# Patient Record
Sex: FEMALE | Race: WHITE | Employment: OTHER | URBAN - NONMETROPOLITAN AREA
[De-identification: names, ages, dates, MRNs, and addresses within clinical notes are randomized per-mention and may not be internally consistent; named-entity substitution may affect disease eponyms.]

---

## 2017-07-03 ENCOUNTER — OFFICE VISIT (OUTPATIENT)
Dept: OBGYN | Age: 67
End: 2017-07-03
Payer: MEDICARE

## 2017-07-03 VITALS
TEMPERATURE: 98.7 F | DIASTOLIC BLOOD PRESSURE: 66 MMHG | SYSTOLIC BLOOD PRESSURE: 130 MMHG | BODY MASS INDEX: 45.18 KG/M2 | WEIGHT: 255 LBS | HEIGHT: 63 IN | HEART RATE: 88 BPM

## 2017-07-03 DIAGNOSIS — Z01.419 WOMEN'S ANNUAL ROUTINE GYNECOLOGICAL EXAMINATION: Primary | ICD-10-CM

## 2017-07-03 DIAGNOSIS — Z12.4 SCREENING FOR CERVICAL CANCER: ICD-10-CM

## 2017-07-03 PROCEDURE — G8598 ASA/ANTIPLAT THER USED: HCPCS | Performed by: OBSTETRICS & GYNECOLOGY

## 2017-07-03 PROCEDURE — G0101 CA SCREEN;PELVIC/BREAST EXAM: HCPCS | Performed by: OBSTETRICS & GYNECOLOGY

## 2017-07-03 RX ORDER — INSULIN LISPRO 100 [IU]/ML
INJECTION, SOLUTION INTRAVENOUS; SUBCUTANEOUS
COMMUNITY
Start: 2017-06-27 | End: 2020-12-03 | Stop reason: ALTCHOICE

## 2017-07-03 RX ORDER — PRAVASTATIN SODIUM 80 MG/1
TABLET ORAL
COMMUNITY
Start: 2017-06-27 | End: 2021-05-11

## 2017-07-03 RX ORDER — TELMISARTAN 40 MG/1
40 TABLET ORAL DAILY
COMMUNITY
End: 2020-12-03

## 2017-07-03 RX ORDER — GABAPENTIN 300 MG/1
CAPSULE ORAL
COMMUNITY
Start: 2017-06-22 | End: 2020-12-03

## 2017-07-03 RX ORDER — CARVEDILOL 12.5 MG/1
TABLET ORAL
COMMUNITY
Start: 2017-06-27 | End: 2021-05-11 | Stop reason: ALTCHOICE

## 2017-07-03 ASSESSMENT — ENCOUNTER SYMPTOMS
EYES NEGATIVE: 1
RESPIRATORY NEGATIVE: 1
GASTROINTESTINAL NEGATIVE: 1
ALLERGIC/IMMUNOLOGIC NEGATIVE: 1

## 2017-11-13 ENCOUNTER — OFFICE VISIT (OUTPATIENT)
Dept: ENDOCRINOLOGY | Facility: CLINIC | Age: 67
End: 2017-11-13

## 2017-11-13 VITALS
BODY MASS INDEX: 49.87 KG/M2 | HEIGHT: 62 IN | DIASTOLIC BLOOD PRESSURE: 82 MMHG | WEIGHT: 271 LBS | HEART RATE: 112 BPM | SYSTOLIC BLOOD PRESSURE: 142 MMHG

## 2017-11-13 DIAGNOSIS — E55.9 VITAMIN D DEFICIENCY: ICD-10-CM

## 2017-11-13 DIAGNOSIS — E08.42 DIABETIC POLYNEUROPATHY ASSOCIATED WITH DIABETES MELLITUS DUE TO UNDERLYING CONDITION (HCC): ICD-10-CM

## 2017-11-13 DIAGNOSIS — E88.1 ACQUIRED LIPODYSTROPHIC DIABETES: Primary | ICD-10-CM

## 2017-11-13 DIAGNOSIS — E11.65 TYPE 2 DIABETES MELLITUS WITH HYPERGLYCEMIA, WITH LONG-TERM CURRENT USE OF INSULIN (HCC): Primary | ICD-10-CM

## 2017-11-13 DIAGNOSIS — E11.59 HYPERTENSION ASSOCIATED WITH DIABETES (HCC): ICD-10-CM

## 2017-11-13 DIAGNOSIS — E78.00 HYPERCHOLESTEROLEMIA: ICD-10-CM

## 2017-11-13 DIAGNOSIS — I15.2 HYPERTENSION ASSOCIATED WITH DIABETES (HCC): ICD-10-CM

## 2017-11-13 DIAGNOSIS — Z79.4 TYPE 2 DIABETES MELLITUS WITH HYPERGLYCEMIA, WITH LONG-TERM CURRENT USE OF INSULIN (HCC): Primary | ICD-10-CM

## 2017-11-13 LAB — GLUCOSE BLDC GLUCOMTR-MCNC: 267 MG/DL (ref 70–130)

## 2017-11-13 PROCEDURE — 99204 OFFICE O/P NEW MOD 45 MIN: CPT | Performed by: INTERNAL MEDICINE

## 2017-11-13 PROCEDURE — 95250 CONT GLUC MNTR PHYS/QHP EQP: CPT | Performed by: INTERNAL MEDICINE

## 2017-11-13 RX ORDER — CARVEDILOL 25 MG/1
25 TABLET ORAL
COMMUNITY
End: 2018-09-13

## 2017-11-13 NOTE — PROGRESS NOTES
"Milagros Nance is a 67 y.o. female who presents for  evaluation of   Chief Complaint   Patient presents with   • Diabetes       Referring provider    Primary Care Provider    KRISTOPHER Tomlin    Duration 10 years    Timing - Diabetes is Constant    Quality -  needs improvement    Severity -  severe    Complications - peripheral neuropathy    Current symptoms/problems  increase appetite, polydipsia and polyuria     Alleviating Factors: Compliance      Side Effects  none    Current diet  in general, a \"healthy\" diet      Current exercise none    Current monitoring regimen: home blood tests - 3 times daily  Home blood sugar records: 200s to 600s    Hypoglycemia none    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   • Afib    • Anxiety    • Jackson's esophagus    • Blood in stool    • Depression    • Diabetes mellitus    • Diarrhea    • Hypertension    • Neuropathy    • Osteoarthritis    • Swelling abdomen      Family History   Problem Relation Age of Onset   • Heart disease Father    • Heart disease Sister    • Diabetes Sister      Social History   Substance Use Topics   • Smoking status: Current Every Day Smoker   • Smokeless tobacco: Never Used   • Alcohol use No         Current Outpatient Prescriptions:   •  albuterol (PROVENTIL HFA;VENTOLIN HFA) 108 (90 BASE) MCG/ACT inhaler, Inhale  into the lungs as needed.  , Disp: , Rfl:   •  carvedilol (COREG) 25 MG tablet, Take 25 mg by mouth., Disp: , Rfl:   •  DULoxetine (CYMBALTA) 60 MG capsule, Take 60 mg by mouth daily., Disp: , Rfl:   •  furosemide (LASIX) 40 MG tablet, Take 40 mg by mouth daily., Disp: , Rfl:   •  gabapentin (NEURONTIN) 300 MG capsule, Take 300 mg by mouth 3 (three) times a day., Disp: , Rfl:   •  potassium chloride (KLOR-CON) 20 MEQ packet, Take 10 mEq by mouth daily., Disp: , Rfl:   •  telmisartan (MICARDIS) 40 MG tablet, Take 40 mg by mouth daily., Disp: , Rfl:   •  warfarin " (COUMADIN) 4 MG tablet, Take 4 mg by mouth daily., Disp: , Rfl:   •  aspirin 81 MG tablet, Take 81 mg by mouth daily., Disp: , Rfl:   •  beclomethasone (QVAR) 80 MCG/ACT inhaler, Inhale 1 puff into the lungs 2 times daily, Disp: , Rfl:   •  bethanechol (URECHOLINE) 25 MG tablet, Take 25 mg by mouth 3 (three) times a day., Disp: , Rfl:   •  choline fenofibrate (TRILIPIX) 135 MG capsule, Take 135 mg by mouth daily., Disp: , Rfl:   •  dexlansoprazole (DEXILANT) 60 MG capsule, Take 60 mg by mouth 2 times daily , Disp: , Rfl:   •  diazepam (VALIUM) 5 MG tablet, Take 5 mg by mouth daily., Disp: , Rfl:   •  DULoxetine (CYMBALTA) 60 MG capsule, Take 60 mg by mouth daily., Disp: , Rfl:   •  gabapentin (NEURONTIN) 300 MG capsule, Take 300 mg by mouth daily., Disp: , Rfl:   •  HYDROcodone-acetaminophen (VICODIN) 5-500 MG per tablet, Take 1 tablet by mouth every 6 (six) hours as needed for moderate pain (4-6)., Disp: , Rfl:   •  HYDROcodone-acetaminophen (VICODIN) 7.5-500 MG per tablet, Take 7.5-500 tablets by mouth every 6 (six) hours., Disp: , Rfl:   •  Insulin Pen Needle (B-D UF III MINI PEN NEEDLES) 31G X 5 MM misc, Use 4 times daily, Disp: 120 each, Rfl: 11  •  Insulin Regular Human, Conc, (HUMULIN R U-500 KWIKPEN) 500 UNIT/ML solution pen-injector CONCENTRATED injection, 130 units for bkfast and 90 for supper, Disp: 5 pen, Rfl: 11  •  metoprolol succinate XL (TOPROL-XL) 50 MG 24 hr tablet, Take 50 mg by mouth daily., Disp: , Rfl:   •  potassium chloride (KLOR-CON) 10 MEQ CR tablet, Take 10 mEq by mouth 2 (two) times a day., Disp: , Rfl:   •  pravastatin (PRAVACHOL) 80 MG tablet, Take 80 mg by mouth daily., Disp: , Rfl:   •  SITagliptin-MetFORMIN HCl -1000 MG tablet sustained-release 24 hour, One tab po daily, Disp: 30 tablet, Rfl: 11  •  verapamil SR (CALAN-SR) 120 MG CR tablet, Take 120 mg by mouth daily., Disp: , Rfl:     Review of Systems    Review of Systems   Constitutional: Positive for fatigue and unexpected  "weight change. Negative for activity change, appetite change, chills, diaphoresis and fever.   HENT: Negative for congestion, dental problem, drooling, ear discharge, ear pain, facial swelling, mouth sores, postnasal drip, rhinorrhea, sinus pressure, sore throat, tinnitus, trouble swallowing and voice change.    Eyes: Negative for photophobia, pain, discharge, redness, itching and visual disturbance.   Respiratory: Positive for shortness of breath and wheezing. Negative for apnea, cough, choking, chest tightness and stridor.    Cardiovascular: Negative for chest pain, palpitations and leg swelling.   Gastrointestinal: Negative for abdominal distention, abdominal pain, constipation, diarrhea, nausea and vomiting.   Endocrine: Positive for polydipsia, polyphagia and polyuria. Negative for cold intolerance and heat intolerance.   Genitourinary: Negative for decreased urine volume, difficulty urinating, dysuria, flank pain, frequency, hematuria and urgency.   Musculoskeletal: Positive for arthralgias and myalgias. Negative for back pain, gait problem, joint swelling, neck pain and neck stiffness.   Skin: Negative for color change, pallor, rash and wound.   Allergic/Immunologic: Negative for immunocompromised state.   Neurological: Positive for weakness. Negative for dizziness, tremors, seizures, syncope, facial asymmetry, speech difficulty, light-headedness, numbness and headaches.   Hematological: Negative for adenopathy.   Psychiatric/Behavioral: Negative for agitation, behavioral problems, confusion, decreased concentration, dysphoric mood, hallucinations, self-injury, sleep disturbance and suicidal ideas. The patient is not nervous/anxious and is not hyperactive.         Objective:   /82 (BP Location: Right arm, Patient Position: Sitting, Cuff Size: Adult)  Pulse 112  Ht 62\" (157.5 cm)  Wt 271 lb (123 kg)  BMI 49.57 kg/m2    Physical Exam   Constitutional: She is oriented to person, place, and time. She " appears well-developed.   HENT:   Head: Normocephalic.   Right Ear: External ear normal.   Left Ear: External ear normal.   Nose: Nose normal.   Eyes: Conjunctivae and EOM are normal. No scleral icterus.   Neck: Normal range of motion. Neck supple. No tracheal deviation present. No thyromegaly present.   Cardiovascular: Normal rate, regular rhythm, normal heart sounds and intact distal pulses.  Exam reveals no gallop and no friction rub.    No murmur heard.  Pulmonary/Chest: Effort normal. No stridor. No respiratory distress. She has wheezes. She has no rales. She exhibits no tenderness.   Abdominal: Soft. Bowel sounds are normal. She exhibits no distension and no mass. There is no tenderness. There is no rebound and no guarding.   Musculoskeletal: Normal range of motion. She exhibits no tenderness or deformity.   Muscular extremities with lipoatrophy and prominent veins   Lymphadenopathy:     She has no cervical adenopathy.   Neurological: She is alert and oriented to person, place, and time. She displays normal reflexes. She exhibits normal muscle tone. Coordination normal.   Skin: No rash noted. No erythema. No pallor.   Psychiatric: She has a normal mood and affect. Her behavior is normal. Judgment and thought content normal.       Lab Review    Results for orders placed or performed in visit on 11/13/17   POC Glucose Fingerstick   Result Value Ref Range    Glucose 267 (A) 70 - 130 mg/dL           Assessment/Plan       ICD-10-CM ICD-9-CM   1. 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 Hypertension associated with diabetes E11.59 250.80    I10 401.9   3. Hypercholesterolemia E78.00 272.0   4. Vitamin D deficiency E55.9 268.9   5. Diabetic polyneuropathy associated with diabetes mellitus due to underlying condition E08.42 249.60     357.2       Glycemic Management:   No results found for: HGBA1C  No results found for: GLUCOSE, BUN, CREATININE, EGFRIFNONA, EGFRIFAFRI, BCR, K, CO2, CALCIUM, PROTENTOTREF,  ALBUMIN, LABIL2, AST, ALT, ANIONGAP  No results found for: WBC, HGB, HCT, MCV, PLT    lipodystropic diabetes     I would like to obtain leptin and adiponectin     lantus 220     humalog 60 w meals - 70     Change to U500     140 a.m.   90 w supper     Add Janumet 100/1000 mg xr daily     Next appt consider actos 15 mg daily     I didn't add trulicity this appt since having nausea    Uncontrolled diabetes  Hyperglycemia    Insertion of continuous glucose monitor to define pattern    The continuous glucose monitor that was inserted is a marianne glucose monitor          Lipid Management  No results found for: CHOL  No results found for: TRIG  No results found for: HDL  No results found for: LDLCALC  No results found for: LDL  No results found for: LDLDIRECT    On pravachol and trilipix    Verify this     Obtain repeat flp     Blood Pressure Management:    Vitals:    11/13/17 1550   BP: 142/82   Pulse: 112     No results found for: GLUCOSE, CALCIUM, NA, K, CO2, CL, BUN, CREATININE, EGFRIFAFRI, EGFRIFNONA, BCR, ANIONGAP      First appt elevated    On verapamil and lopressor    Not on ACEi or ARB?      Microvascular Complication Monitoring:      Eye Exam Evaluation, verify date    -----------    Last Microalbumin-Proteinuria Assessment    No results found for: MALBCRERATIO    No results found for: UTPCR    -----------      Neuropathy, yes     On cymbalta / neurontin       Immunizations:      Pending to verify     Preventive Care:      I advised the patient of the risks in continuing to use tobacco, and I provided this patient with smoking cessation educational materials.  I also discussed how to quit smoking and the patient has expressed the willingness to quit.      During this visit, I spent 3-10 mintues counseling the patient regarding smoking cessation.         Weight Related:   Wt Readings from Last 3 Encounters:   11/13/17 271 lb (123 kg)     Body mass index is 49.57 kg/(m^2).        Diet interventions: moderate (500  kCal/d) deficit diet.      Bone Health    No results found for: PTH, CALCIUM, CAION, PHOS, [id number]    Thyroid Health    No results found for: TSH            Other Diabetes Related Aspects       No results found for: [id number]        I reviewed and summarized records from KRISTOPHER Tomlin from 2017 and I reviewed / ordered labs.     Orders Placed This Encounter   Procedures   • Adiponectin     Standing Status:   Future     Standing Expiration Date:   11/13/2018   • Leptin, Serum     Standing Status:   Future     Standing Expiration Date:   11/13/2018   • Comprehensive Metabolic Panel   • Vitamin B12   • Vitamin D 25 Hydroxy   • Hemoglobin A1c   • C-Peptide   • TSH   • POC Glucose Fingerstick   • CBC & Differential     Order Specific Question:   Manual Differential     Answer:   No         A copy of my note was sent to KRISTOPHER Tomlin    Please see my above opinion and suggestions.

## 2017-11-14 NOTE — PROGRESS NOTES
Milagros Nance is a 67 y.o. female seen by diabetes educator 11/13/2017 for insertion of Kervin diagnostic continuous glucose monitor.     Sensor inserted in office. Instructed patient to wear sensor up to 14 days. Patient is to return to clinic in 2 weeks for sensor removal and results. Instructed patient to remove sensor if he has a CT scan, MRI, or X-ray, or if skin irritation occurs.     1. Stop Lantus and Humalog  2. Start Humulin R U 500   130 units at breakfast   90 units at supper  Patient with diminished appetite d/t Jackson's esophagus. Discussed the importance of eating 45 grams at breakfast and supper in prevention of low blood sugar. Discussed carbohydrate foods that are better-tolerated.     Instructed patient to increase U500 dose by 10 units if blood sugars are still in the 200's after 5 days of use. Instructed patient to decrease dose by 10 units if she has a low blood sugar.     3. Start Michele Bell MS, RD, LD, CDE

## 2017-11-28 ENCOUNTER — OFFICE VISIT (OUTPATIENT)
Dept: ENDOCRINOLOGY | Facility: CLINIC | Age: 67
End: 2017-11-28

## 2017-11-28 VITALS
SYSTOLIC BLOOD PRESSURE: 106 MMHG | WEIGHT: 271 LBS | HEIGHT: 62 IN | BODY MASS INDEX: 49.87 KG/M2 | HEART RATE: 106 BPM | DIASTOLIC BLOOD PRESSURE: 58 MMHG

## 2017-11-28 DIAGNOSIS — I15.2 HYPERTENSION ASSOCIATED WITH DIABETES (HCC): ICD-10-CM

## 2017-11-28 DIAGNOSIS — E08.42 DIABETIC POLYNEUROPATHY ASSOCIATED WITH DIABETES MELLITUS DUE TO UNDERLYING CONDITION (HCC): Primary | ICD-10-CM

## 2017-11-28 DIAGNOSIS — E55.9 VITAMIN D DEFICIENCY: ICD-10-CM

## 2017-11-28 DIAGNOSIS — E78.00 HYPERCHOLESTEROLEMIA: ICD-10-CM

## 2017-11-28 DIAGNOSIS — E11.59 HYPERTENSION ASSOCIATED WITH DIABETES (HCC): ICD-10-CM

## 2017-11-28 PROCEDURE — 95251 CONT GLUC MNTR ANALYSIS I&R: CPT | Performed by: NURSE PRACTITIONER

## 2017-11-28 PROCEDURE — 99214 OFFICE O/P EST MOD 30 MIN: CPT | Performed by: NURSE PRACTITIONER

## 2017-11-28 PROCEDURE — 99406 BEHAV CHNG SMOKING 3-10 MIN: CPT | Performed by: NURSE PRACTITIONER

## 2017-11-28 RX ORDER — ESOMEPRAZOLE MAGNESIUM 40 MG/1
CAPSULE, DELAYED RELEASE ORAL
COMMUNITY
Start: 2017-11-24

## 2017-11-28 NOTE — PROGRESS NOTES
"  Subjective    Milagros Nance is a 67 y.o. female. she is here today for follow-up.    History of Present Illness         Primary Care Provider     KRISTOPHER Tomlin     Duration 10 years     Timing - Diabetes is Constant     Quality -  needs improvement     Severity -  severe     Complications - peripheral neuropathy     Current symptoms/problems  increase appetite, polydipsia and polyuria      Alleviating Factors: Compliance       Side Effects  none     Current diet  in general, a \"healthy\" diet       Current exercise none     Current monitoring regimen: home blood tests - 3 times daily  Home blood sugar records:     Kervin average 175      Hypoglycemia none         The following portions of the patient's history were reviewed and updated as appropriate:   Past Medical History:   Diagnosis Date   • Abdominal pain    • Acquired lipodystrophic diabetes 2017   • Afib    • Anxiety    • Jackson's esophagus    • Blood in stool    • Depression    • Diabetes mellitus    • Diarrhea    • Hypertension    • Neuropathy    • Osteoarthritis    • Swelling abdomen      Past Surgical History:   Procedure Laterality Date   • CARDIAC CATHETERIZATION Bilateral    • CARPAL TUNNEL RELEASE     •  SECTION     • DILATATION AND CURETTAGE     • EXTERNAL EAR SURGERY     • HYSTEROSCOPY       Family History   Problem Relation Age of Onset   • Heart disease Father    • Heart disease Sister    • Diabetes Sister      OB History     No data available        Current Outpatient Prescriptions   Medication Sig Dispense Refill   • albuterol (PROVENTIL HFA;VENTOLIN HFA) 108 (90 BASE) MCG/ACT inhaler Inhale  into the lungs as needed.       • aspirin 81 MG tablet Take 81 mg by mouth daily.     • beclomethasone (QVAR) 80 MCG/ACT inhaler Inhale 1 puff into the lungs 2 times daily     • bethanechol (URECHOLINE) 25 MG tablet Take 25 mg by mouth 3 (three) times a day.     • carvedilol (COREG) 25 MG tablet Take 25 mg by mouth.     • DULoxetine " (CYMBALTA) 60 MG capsule Take 60 mg by mouth daily.     • esomeprazole (nexIUM) 40 MG capsule      • furosemide (LASIX) 40 MG tablet Take 40 mg by mouth daily.     • gabapentin (NEURONTIN) 300 MG capsule Take 300 mg by mouth 3 (three) times a day.     • HYDROcodone-acetaminophen (VICODIN) 7.5-500 MG per tablet Take 7.5-500 tablets by mouth every 6 (six) hours.     • Insulin Pen Needle (B-D UF III MINI PEN NEEDLES) 31G X 5 MM misc Use 4 times daily 120 each 11   • Insulin Regular Human, Conc, (HUMULIN R U-500 KWIKPEN) 500 UNIT/ML solution pen-injector CONCENTRATED injection 130 units for bkfast and 90 for supper 5 pen 11   • potassium chloride (KLOR-CON) 10 MEQ CR tablet Take 10 mEq by mouth 2 (two) times a day.     • pravastatin (PRAVACHOL) 80 MG tablet Take 80 mg by mouth daily.     • SITagliptin-MetFORMIN HCl -1000 MG tablet sustained-release 24 hour One tab po daily 30 tablet 11   • telmisartan (MICARDIS) 40 MG tablet Take 40 mg by mouth daily.     • warfarin (COUMADIN) 4 MG tablet Take 4 mg by mouth daily.       No current facility-administered medications for this visit.      Allergies   Allergen Reactions   • Avelox [Moxifloxacin]    • Moxifloxacin Hcl In Nacl      Rash, itching- could not breathe     Social History     Social History   • Marital status: Single     Spouse name: N/A   • Number of children: N/A   • Years of education: N/A     Social History Main Topics   • Smoking status: Current Every Day Smoker   • Smokeless tobacco: Never Used   • Alcohol use No   • Drug use: None   • Sexual activity: Not Asked     Other Topics Concern   • None     Social History Narrative       Review of Systems  Review of Systems   Constitutional: Negative for activity change, appetite change, diaphoresis and fatigue.   HENT: Negative for congestion, dental problem, facial swelling, sneezing, sore throat, tinnitus, trouble swallowing and voice change.    Eyes: Negative for photophobia, pain, discharge, redness,  "itching and visual disturbance.   Respiratory: Negative for apnea, cough, choking, chest tightness and shortness of breath.    Cardiovascular: Negative for chest pain, palpitations and leg swelling.   Gastrointestinal: Negative for abdominal distention, abdominal pain, constipation, diarrhea, nausea and vomiting.   Endocrine: Negative for cold intolerance, heat intolerance, polydipsia, polyphagia and polyuria.   Genitourinary: Negative for difficulty urinating, dysuria, frequency, hematuria and urgency.   Musculoskeletal: Negative for arthralgias, back pain, gait problem, joint swelling, myalgias, neck pain and neck stiffness.   Skin: Negative for color change, pallor, rash and wound.   Allergic/Immunologic: Negative for environmental allergies.   Neurological: Negative for dizziness, tremors, weakness, light-headedness, numbness and headaches.   Hematological: Negative for adenopathy. Does not bruise/bleed easily.   Psychiatric/Behavioral: Negative for behavioral problems, confusion and sleep disturbance.        Objective    /58 (BP Location: Left arm, Patient Position: Sitting, Cuff Size: Adult)  Pulse 106  Ht 62\" (157.5 cm)  Wt 271 lb (123 kg)  BMI 49.57 kg/m2  Physical Exam   Constitutional: She is oriented to person, place, and time. She appears well-developed and well-nourished. No distress.   HENT:   Head: Normocephalic and atraumatic.   Right Ear: External ear normal.   Left Ear: External ear normal.   Nose: Nose normal.   Eyes: Conjunctivae and EOM are normal. Pupils are equal, round, and reactive to light.   Neck: Normal range of motion. Neck supple. No tracheal deviation present. No thyromegaly present.   Cardiovascular: Normal rate, regular rhythm and normal heart sounds.    No murmur heard.  Pulmonary/Chest: Effort normal and breath sounds normal. No respiratory distress. She has no wheezes.   Abdominal: Soft. Bowel sounds are normal. There is no tenderness. There is no rebound and no " guarding.   Musculoskeletal: Normal range of motion. She exhibits no edema, tenderness or deformity.   Neurological: She is alert and oriented to person, place, and time. No cranial nerve deficit.   Skin: Skin is warm and dry. No rash noted.   Psychiatric: She has a normal mood and affect. Her behavior is normal. Judgment and thought content normal.       Lab Review  No results found for: GLUCOSE, NA, K, CL, CO2, BUN, CREATININE, HGBA1C, TRIG, LDL    Assessment/Plan      1. Diabetic polyneuropathy associated with diabetes mellitus due to underlying condition    2. Hypertension associated with diabetes    3. Vitamin D deficiency    4. Hypercholesterolemia    .    Medications prescribed:  Outpatient Encounter Prescriptions as of 11/28/2017   Medication Sig Dispense Refill   • albuterol (PROVENTIL HFA;VENTOLIN HFA) 108 (90 BASE) MCG/ACT inhaler Inhale  into the lungs as needed.       • aspirin 81 MG tablet Take 81 mg by mouth daily.     • beclomethasone (QVAR) 80 MCG/ACT inhaler Inhale 1 puff into the lungs 2 times daily     • bethanechol (URECHOLINE) 25 MG tablet Take 25 mg by mouth 3 (three) times a day.     • carvedilol (COREG) 25 MG tablet Take 25 mg by mouth.     • DULoxetine (CYMBALTA) 60 MG capsule Take 60 mg by mouth daily.     • esomeprazole (nexIUM) 40 MG capsule      • furosemide (LASIX) 40 MG tablet Take 40 mg by mouth daily.     • gabapentin (NEURONTIN) 300 MG capsule Take 300 mg by mouth 3 (three) times a day.     • HYDROcodone-acetaminophen (VICODIN) 7.5-500 MG per tablet Take 7.5-500 tablets by mouth every 6 (six) hours.     • Insulin Pen Needle (B-D UF III MINI PEN NEEDLES) 31G X 5 MM misc Use 4 times daily 120 each 11   • Insulin Regular Human, Conc, (HUMULIN R U-500 KWIKPEN) 500 UNIT/ML solution pen-injector CONCENTRATED injection 130 units for bkfast and 90 for supper 5 pen 11   • potassium chloride (KLOR-CON) 10 MEQ CR tablet Take 10 mEq by mouth 2 (two) times a day.     • pravastatin (PRAVACHOL)  80 MG tablet Take 80 mg by mouth daily.     • SITagliptin-MetFORMIN HCl -1000 MG tablet sustained-release 24 hour One tab po daily 30 tablet 11   • telmisartan (MICARDIS) 40 MG tablet Take 40 mg by mouth daily.     • warfarin (COUMADIN) 4 MG tablet Take 4 mg by mouth daily.     • [DISCONTINUED] choline fenofibrate (TRILIPIX) 135 MG capsule Take 135 mg by mouth daily.     • [DISCONTINUED] dexlansoprazole (DEXILANT) 60 MG capsule Take 60 mg by mouth 2 times daily      • [DISCONTINUED] diazepam (VALIUM) 5 MG tablet Take 5 mg by mouth daily.     • [DISCONTINUED] DULoxetine (CYMBALTA) 60 MG capsule Take 60 mg by mouth daily.     • [DISCONTINUED] gabapentin (NEURONTIN) 300 MG capsule Take 300 mg by mouth daily.     • [DISCONTINUED] HYDROcodone-acetaminophen (VICODIN) 5-500 MG per tablet Take 1 tablet by mouth every 6 (six) hours as needed for moderate pain (4-6).     • [DISCONTINUED] metoprolol succinate XL (TOPROL-XL) 50 MG 24 hr tablet Take 50 mg by mouth daily.     • [DISCONTINUED] potassium chloride (KLOR-CON) 20 MEQ packet Take 10 mEq by mouth daily.     • [DISCONTINUED] verapamil SR (CALAN-SR) 120 MG CR tablet Take 120 mg by mouth daily.       No facility-administered encounter medications on file as of 11/28/2017.        Orders placed during this encounter include:  No orders of the defined types were placed in this encounter.      Glycemic Management       lipodystropic diabetes      I would like to obtain leptin and adiponectin      lantus 220      humalog 60 w meals - 70      Change to U500      140 a.m. --taking 130   90 w supper      Add Janumet 100/1000 mg xr daily      Next appt consider actos 15 mg daily --- not starting she has CHF      I didn't add trulicity this appt since having nausea     Uncontrolled diabetes  Hyperglycemia     Kervin sensor downloaded from Nov. 13 - 21, 2017     Average 175    Estimated A1c 7.7%     Lows at noon         Humulin U 500      Decrease am dose to 120 units        Supper dose increase to 95 units               Lipid Management       On pravachol and trilipix     Verify this      Obtain repeat flp      Blood Pressure Management:        Vitals:     11/13/17 1550   BP: 142/82   Pulse: 112      No results found for: GLUCOSE, CALCIUM, NA, K, CO2, CL, BUN, CREATININE, EGFRIFAFRI, EGFRIFNONA, BCR, ANIONGAP        First appt elevated     On verapamil and lopressor     Not on ACEi or ARB?        Microvascular Complication Monitoring:       Eye Exam Evaluation, verify date     -----------     Last Microalbumin-Proteinuria Assessment     No results found for: MALBCRERATIO     No results found for: UTPCR     -----------        Neuropathy, yes      On cymbalta / neurontin         Immunizations:       Pending to verify      Preventive Care:       I advised the patient of the risks in continuing to use tobacco, and I provided this patient with smoking cessation educational materials.  I also discussed how to quit smoking and the patient has expressed the willingness to quit.       During this visit, I spent 3-10 mintues counseling the patient regarding smoking cessation.            Weight Related:       Wt Readings from Last 3 Encounters:   11/13/17 271 lb (123 kg)      Body mass index is 49.57 kg/(m^2).           Diet interventions: moderate (500 kCal/d) deficit diet.        Bone Health     No results found for: PTH, CALCIUM, CAION, PHOS, [id number]     Thyroid Health     No results found for: TSH                 Other Diabetes Related Aspects         No results found for: [id number]              4. Follow-up: Return in about 8 weeks (around 1/23/2018).

## 2018-04-12 ENCOUNTER — OFFICE VISIT (OUTPATIENT)
Dept: ENDOCRINOLOGY | Facility: CLINIC | Age: 68
End: 2018-04-12

## 2018-04-12 VITALS
DIASTOLIC BLOOD PRESSURE: 80 MMHG | HEART RATE: 74 BPM | HEIGHT: 62 IN | WEIGHT: 276 LBS | BODY MASS INDEX: 50.79 KG/M2 | SYSTOLIC BLOOD PRESSURE: 136 MMHG

## 2018-04-12 DIAGNOSIS — E08.42 DIABETIC POLYNEUROPATHY ASSOCIATED WITH DIABETES MELLITUS DUE TO UNDERLYING CONDITION (HCC): ICD-10-CM

## 2018-04-12 DIAGNOSIS — I15.2 HYPERTENSION ASSOCIATED WITH DIABETES (HCC): ICD-10-CM

## 2018-04-12 DIAGNOSIS — IMO0002 UNCONTROLLED TYPE 2 DIABETES MELLITUS WITH COMPLICATION, WITH LONG-TERM CURRENT USE OF INSULIN: Primary | ICD-10-CM

## 2018-04-12 DIAGNOSIS — E55.9 VITAMIN D DEFICIENCY: ICD-10-CM

## 2018-04-12 DIAGNOSIS — E78.00 HYPERCHOLESTEROLEMIA: ICD-10-CM

## 2018-04-12 DIAGNOSIS — E11.59 HYPERTENSION ASSOCIATED WITH DIABETES (HCC): ICD-10-CM

## 2018-04-12 DIAGNOSIS — E88.1 ACQUIRED LIPODYSTROPHIC DIABETES: ICD-10-CM

## 2018-04-12 PROCEDURE — 99214 OFFICE O/P EST MOD 30 MIN: CPT | Performed by: NURSE PRACTITIONER

## 2018-04-12 NOTE — PROGRESS NOTES
"  Subjective    Milagros Nance is a 67 y.o. female. she is here today for follow-up.    History of Present Illness         Primary Care Provider     KRISTOPHER Tomlin     Duration 10 years     Timing - Diabetes is Constant     Quality -  needs improvement     Severity -  severe     Complications - peripheral neuropathy     Current symptoms/problems  increase appetite, polydipsia and polyuria      Alleviating Factors: Compliance       Side Effects  none     Current diet  in general, a \"healthy\" diet       Current exercise none     Current monitoring regimen: home blood tests - 3 times daily    Last HgbA1c 8.1% from 2018       Home blood sugar records:      This was 131      Hypoglycemia none           The following portions of the patient's history were reviewed and updated as appropriate:   Past Medical History:   Diagnosis Date   • Abdominal pain    • Acquired lipodystrophic diabetes 2017   • Afib    • Anxiety    • Jackson's esophagus    • Blood in stool    • Depression    • Diabetes mellitus    • Diarrhea    • Hypertension    • Neuropathy    • Osteoarthritis    • Swelling abdomen      Past Surgical History:   Procedure Laterality Date   • CARDIAC CATHETERIZATION Bilateral    • CARPAL TUNNEL RELEASE     •  SECTION     • DILATATION AND CURETTAGE     • EXTERNAL EAR SURGERY     • HYSTEROSCOPY       Family History   Problem Relation Age of Onset   • Heart disease Father    • Heart disease Sister    • Diabetes Sister      OB History     No data available        Current Outpatient Prescriptions   Medication Sig Dispense Refill   • albuterol (PROVENTIL HFA;VENTOLIN HFA) 108 (90 BASE) MCG/ACT inhaler Inhale  into the lungs as needed.       • aspirin 81 MG tablet Take 81 mg by mouth daily.     • beclomethasone (QVAR) 80 MCG/ACT inhaler Inhale 1 puff into the lungs 2 times daily     • bethanechol (URECHOLINE) 25 MG tablet Take 25 mg by mouth 3 (three) times a day.     • carvedilol (COREG) 25 MG tablet " Take 25 mg by mouth.     • DULoxetine (CYMBALTA) 60 MG capsule Take 60 mg by mouth daily.     • esomeprazole (nexIUM) 40 MG capsule      • furosemide (LASIX) 40 MG tablet Take 40 mg by mouth daily.     • gabapentin (NEURONTIN) 300 MG capsule Take 300 mg by mouth 3 (three) times a day.     • HYDROcodone-acetaminophen (VICODIN) 7.5-500 MG per tablet Take 7.5-500 tablets by mouth every 6 (six) hours.     • Insulin Pen Needle (B-D UF III MINI PEN NEEDLES) 31G X 5 MM misc Use 4 times daily 120 each 11   • Insulin Regular Human, Conc, (HUMULIN R U-500 KWIKPEN) 500 UNIT/ML solution pen-injector CONCENTRATED injection 130 units for bkfast and 90 for supper 5 pen 11   • potassium chloride (KLOR-CON) 10 MEQ CR tablet Take 10 mEq by mouth 2 (two) times a day.     • pravastatin (PRAVACHOL) 80 MG tablet Take 80 mg by mouth daily.     • SITagliptin-MetFORMIN HCl -1000 MG tablet sustained-release 24 hour One tab po daily 30 tablet 11   • telmisartan (MICARDIS) 40 MG tablet Take 40 mg by mouth daily.     • warfarin (COUMADIN) 4 MG tablet Take 4 mg by mouth daily.       No current facility-administered medications for this visit.      Allergies   Allergen Reactions   • Avelox [Moxifloxacin]    • Moxifloxacin Hcl In Nacl      Rash, itching- could not breathe     Social History     Social History   • Marital status: Single     Social History Main Topics   • Smoking status: Current Every Day Smoker   • Smokeless tobacco: Never Used   • Alcohol use No   • Drug use: Unknown     Other Topics Concern   • Not on file       Review of Systems  Review of Systems   Constitutional: Negative for activity change, appetite change, diaphoresis and fatigue.   HENT: Negative for facial swelling, sneezing, sore throat, tinnitus, trouble swallowing and voice change.    Eyes: Negative for photophobia, pain, discharge, redness, itching and visual disturbance.   Respiratory: Negative for apnea, cough, choking, chest tightness and shortness of breath.  "   Cardiovascular: Negative for chest pain, palpitations and leg swelling.   Gastrointestinal: Negative for abdominal distention, abdominal pain, constipation, diarrhea, nausea and vomiting.   Endocrine: Negative for cold intolerance, heat intolerance, polydipsia, polyphagia and polyuria.   Genitourinary: Negative for difficulty urinating, dysuria, frequency, hematuria and urgency.   Musculoskeletal: Negative for arthralgias, back pain, gait problem, joint swelling, myalgias, neck pain and neck stiffness.   Skin: Negative for color change, pallor, rash and wound.   Neurological: Negative for dizziness, tremors, weakness, light-headedness, numbness and headaches.   Hematological: Negative for adenopathy. Does not bruise/bleed easily.   Psychiatric/Behavioral: Negative for behavioral problems, confusion and sleep disturbance.        Objective    /80 (BP Location: Left arm, Patient Position: Sitting, Cuff Size: Adult)   Pulse 74   Ht 157.5 cm (62\")   Wt 125 kg (276 lb)   BMI 50.48 kg/m²   Physical Exam   Constitutional: She is oriented to person, place, and time. She appears well-developed and well-nourished. No distress.   HENT:   Head: Normocephalic and atraumatic.   Right Ear: External ear normal.   Left Ear: External ear normal.   Nose: Nose normal.   Eyes: Conjunctivae and EOM are normal. Pupils are equal, round, and reactive to light.   Neck: Normal range of motion. Neck supple. No tracheal deviation present. No thyromegaly present.   Cardiovascular: Normal rate, regular rhythm and normal heart sounds.    No murmur heard.  Pulmonary/Chest: Effort normal and breath sounds normal. No respiratory distress. She has no wheezes.   Abdominal: Soft. Bowel sounds are normal. There is no tenderness. There is no rebound and no guarding.   Musculoskeletal: Normal range of motion. She exhibits no edema, tenderness or deformity.    Milagros had a diabetic foot exam performed today.   During the foot exam she had a " monofilament test performed.    Neurological Sensory Findings -  Altered sharp/dull right ankle/foot discrimination and altered sharp/dull left ankle/foot discrimination. Right ankle/foot altered proprioception and left ankle/foot altered proprioception.  Vascular Status -  Her right foot exhibits abnormal foot edema. Her left foot exhibits abnormal foot edema.  Skin Integrity  -  She has right foot onychomycosis, callous right foot and right heel is dry and cracked.She has left foot onychomycosis, callous left foot and left heel dry and cracked..  Neurological: She is alert and oriented to person, place, and time. No cranial nerve deficit.   Skin: Skin is warm and dry. No rash noted.   Psychiatric: She has a normal mood and affect. Her behavior is normal. Judgment and thought content normal.       Lab Review  No results found for: GLUCOSE, NA, K, CL, CO2, BUN, CREATININE, HGBA1C, TRIG, LDL    Assessment/Plan      1. Uncontrolled type 2 diabetes mellitus with complication, with long-term current use of insulin    2. Diabetic polyneuropathy associated with diabetes mellitus due to underlying condition    3. Hypertension associated with diabetes    4. Hypercholesterolemia    5. Vitamin D deficiency    6. Acquired lipodystrophic diabetes    .    Medications prescribed:  Outpatient Encounter Prescriptions as of 4/12/2018   Medication Sig Dispense Refill   • albuterol (PROVENTIL HFA;VENTOLIN HFA) 108 (90 BASE) MCG/ACT inhaler Inhale  into the lungs as needed.       • aspirin 81 MG tablet Take 81 mg by mouth daily.     • beclomethasone (QVAR) 80 MCG/ACT inhaler Inhale 1 puff into the lungs 2 times daily     • bethanechol (URECHOLINE) 25 MG tablet Take 25 mg by mouth 3 (three) times a day.     • carvedilol (COREG) 25 MG tablet Take 25 mg by mouth.     • DULoxetine (CYMBALTA) 60 MG capsule Take 60 mg by mouth daily.     • esomeprazole (nexIUM) 40 MG capsule      • furosemide (LASIX) 40 MG tablet Take 40 mg by mouth daily.      • gabapentin (NEURONTIN) 300 MG capsule Take 300 mg by mouth 3 (three) times a day.     • HYDROcodone-acetaminophen (VICODIN) 7.5-500 MG per tablet Take 7.5-500 tablets by mouth every 6 (six) hours.     • Insulin Pen Needle (B-D UF III MINI PEN NEEDLES) 31G X 5 MM misc Use 4 times daily 120 each 11   • Insulin Regular Human, Conc, (HUMULIN R U-500 KWIKPEN) 500 UNIT/ML solution pen-injector CONCENTRATED injection 130 units for bkfast and 90 for supper 5 pen 11   • potassium chloride (KLOR-CON) 10 MEQ CR tablet Take 10 mEq by mouth 2 (two) times a day.     • pravastatin (PRAVACHOL) 80 MG tablet Take 80 mg by mouth daily.     • SITagliptin-MetFORMIN HCl -1000 MG tablet sustained-release 24 hour One tab po daily 30 tablet 11   • telmisartan (MICARDIS) 40 MG tablet Take 40 mg by mouth daily.     • warfarin (COUMADIN) 4 MG tablet Take 4 mg by mouth daily.       No facility-administered encounter medications on file as of 4/12/2018.        Orders placed during this encounter include:  No orders of the defined types were placed in this encounter.    Glycemic Management         lipodystropic diabetes      I would like to obtain leptin and adiponectin     Stopped below regimen      lantus 220      humalog 60 w meals - 70      Change to U500       Janumet 100/1000 mg xr daily      Next appt consider actos 15 mg daily --- not starting she has CHF      I didn't add trulicity this appt since having nausea     Humulin U 500        Decrease am dose to 120 units --- taking 130         Supper dose increase to 95 units --- taking 90 units               Lipid Management        On pravachol and trilipix     Verify this      Obtain repeat flp      Blood Pressure Management:            First appt elevated     On verapamil and lopressor          Microvascular Complication Monitoring:       Eye Exam Evaluation, verify date     -----------     Last Microalbumin-Proteinuria Assessment     No results found for: JUAN CARLOS     No  results found for: UTPCR     -----------        Neuropathy, yes      On cymbalta / neurontin         Immunizations:       Pending to verify      Preventive Care:       I advised the patient of the risks in continuing to use tobacco, and I provided this patient with smoking cessation educational materials.  I also discussed how to quit smoking and the patient has expressed the willingness to quit.       During this visit, I spent 3-10 mintues counseling the patient regarding smoking cessation.            Weight Related:         Wt Readings from Last 3 Encounters:   11/13/17 271 lb (123 kg)      Body mass index is 49.57 kg/(m^2).           Diet interventions: moderate (500 kCal/d) deficit diet.        Bone Health     No results found for: PTH, CALCIUM, CAION, PHOS, [id number]     Thyroid Health     No results found for: TSH                 Other Diabetes Related Aspects         No results found for: [id number]           4. Follow-up: Return in about 3 months (around 7/12/2018) for Recheck.

## 2018-08-28 ENCOUNTER — OFFICE VISIT (OUTPATIENT)
Dept: ENDOCRINOLOGY | Facility: CLINIC | Age: 68
End: 2018-08-28

## 2018-08-28 VITALS
BODY MASS INDEX: 46.91 KG/M2 | HEART RATE: 94 BPM | OXYGEN SATURATION: 93 % | HEIGHT: 62 IN | SYSTOLIC BLOOD PRESSURE: 118 MMHG | DIASTOLIC BLOOD PRESSURE: 68 MMHG | WEIGHT: 254.9 LBS

## 2018-08-28 DIAGNOSIS — E08.42 DIABETIC POLYNEUROPATHY ASSOCIATED WITH DIABETES MELLITUS DUE TO UNDERLYING CONDITION (HCC): ICD-10-CM

## 2018-08-28 DIAGNOSIS — E11.59 HYPERTENSION ASSOCIATED WITH DIABETES (HCC): ICD-10-CM

## 2018-08-28 DIAGNOSIS — E78.00 HYPERCHOLESTEROLEMIA: ICD-10-CM

## 2018-08-28 DIAGNOSIS — I15.2 HYPERTENSION ASSOCIATED WITH DIABETES (HCC): ICD-10-CM

## 2018-08-28 DIAGNOSIS — E55.9 VITAMIN D DEFICIENCY: ICD-10-CM

## 2018-08-28 DIAGNOSIS — IMO0002 UNCONTROLLED TYPE 2 DIABETES MELLITUS WITH COMPLICATION, WITH LONG-TERM CURRENT USE OF INSULIN: Primary | ICD-10-CM

## 2018-08-28 PROCEDURE — 99214 OFFICE O/P EST MOD 30 MIN: CPT | Performed by: INTERNAL MEDICINE

## 2018-08-28 RX ORDER — LORAZEPAM 1 MG/1
1 TABLET ORAL EVERY 8 HOURS PRN
COMMUNITY

## 2018-08-28 RX ORDER — FLUCONAZOLE 150 MG/1
TABLET ORAL
Qty: 2 TABLET | Refills: 6 | Status: SHIPPED | OUTPATIENT
Start: 2018-08-28 | End: 2018-09-13

## 2018-08-28 NOTE — PROGRESS NOTES
"  Subjective    Milagros Nance is a 67 y.o. female. she is here today for follow-up.    Diabetes   Pertinent negatives for hypoglycemia include no confusion, dizziness, headaches, pallor or tremors. Pertinent negatives for diabetes include no chest pain, no fatigue, no polydipsia, no polyphagia, no polyuria and no weakness.            Primary Care Provider     KRISTOPHER Tomlin     Duration 10 years     Timing - Diabetes is Constant     Quality -  needs improvement - has improved      Severity -  severe     Complications - peripheral neuropathy     Current symptoms/problems  increase appetite, polydipsia and polyuria      Alleviating Factors: Compliance       Side Effects  none     Current diet  in general, a \"healthy\" diet       Current exercise none     Current monitoring regimen: home blood tests - 3 times daily    Last HgbA1c 8.1% from 2018       Home blood sugar records:      This was 131      Hypoglycemia none           The following portions of the patient's history were reviewed and updated as appropriate:   Past Medical History:   Diagnosis Date   • Abdominal pain    • Acquired lipodystrophic diabetes 2017   • Afib (CMS/HCC)    • Anxiety    • Jackson's esophagus    • Blood in stool    • Depression    • Diabetes mellitus (CMS/HCC)    • Diarrhea    • Hypertension    • Neuropathy    • Osteoarthritis    • Swelling abdomen      Past Surgical History:   Procedure Laterality Date   • CARDIAC CATHETERIZATION Bilateral    • CARPAL TUNNEL RELEASE     •  SECTION     • DILATATION AND CURETTAGE     • EXTERNAL EAR SURGERY     • HYSTEROSCOPY       Family History   Problem Relation Age of Onset   • Heart disease Father    • Heart disease Sister    • Diabetes Sister      OB History     No data available        Current Outpatient Prescriptions   Medication Sig Dispense Refill   • albuterol (PROVENTIL HFA;VENTOLIN HFA) 108 (90 BASE) MCG/ACT inhaler Inhale  into the lungs as needed.       • aspirin 81 MG " tablet Take 81 mg by mouth daily.     • beclomethasone (QVAR) 80 MCG/ACT inhaler Inhale 1 puff into the lungs 2 times daily     • bethanechol (URECHOLINE) 25 MG tablet Take 25 mg by mouth 3 (three) times a day.     • carvedilol (COREG) 25 MG tablet Take 25 mg by mouth.     • DULoxetine (CYMBALTA) 60 MG capsule Take 60 mg by mouth daily.     • esomeprazole (nexIUM) 40 MG capsule      • furosemide (LASIX) 40 MG tablet Take 40 mg by mouth daily.     • gabapentin (NEURONTIN) 300 MG capsule Take 300 mg by mouth 3 (three) times a day.     • HYDROcodone-acetaminophen (VICODIN) 7.5-500 MG per tablet Take 7.5-500 tablets by mouth every 6 (six) hours.     • Insulin Pen Needle (B-D UF III MINI PEN NEEDLES) 31G X 5 MM misc Use 4 times daily 120 each 11   • Insulin Regular Human, Conc, (HUMULIN R U-500 KWIKPEN) 500 UNIT/ML solution pen-injector CONCENTRATED injection 130 units for bkfast and 90 for supper 5 pen 11   • LORazepam (ATIVAN) 1 MG tablet Take 1 mg by mouth Every 8 (Eight) Hours As Needed for Anxiety.     • potassium chloride (KLOR-CON) 10 MEQ CR tablet Take 10 mEq by mouth 2 (two) times a day.     • SITagliptin-MetFORMIN HCl -1000 MG tablet sustained-release 24 hour One tab po daily 30 tablet 11   • warfarin (COUMADIN) 4 MG tablet Take 4 mg by mouth daily.       No current facility-administered medications for this visit.      Allergies   Allergen Reactions   • Avelox [Moxifloxacin]    • Moxifloxacin Hcl In Nacl      Rash, itching- could not breathe     Social History     Social History   • Marital status: Single     Social History Main Topics   • Smoking status: Current Every Day Smoker   • Smokeless tobacco: Never Used   • Alcohol use No   • Drug use: Unknown     Other Topics Concern   • Not on file       Review of Systems  Review of Systems   Constitutional: Negative for activity change, appetite change, diaphoresis and fatigue.   HENT: Negative for facial swelling, sneezing, sore throat, tinnitus, trouble  "swallowing and voice change.    Eyes: Negative for photophobia, pain, discharge, redness, itching and visual disturbance.   Respiratory: Negative for apnea, cough, choking, chest tightness and shortness of breath.    Cardiovascular: Negative for chest pain, palpitations and leg swelling.   Gastrointestinal: Negative for abdominal distention, abdominal pain, constipation, diarrhea, nausea and vomiting.   Endocrine: Negative for cold intolerance, heat intolerance, polydipsia, polyphagia and polyuria.   Genitourinary: Negative for difficulty urinating, dysuria, frequency, hematuria and urgency.   Musculoskeletal: Negative for arthralgias, back pain, gait problem, joint swelling, myalgias, neck pain and neck stiffness.   Skin: Negative for color change, pallor, rash and wound.   Neurological: Negative for dizziness, tremors, weakness, light-headedness, numbness and headaches.   Hematological: Negative for adenopathy. Does not bruise/bleed easily.   Psychiatric/Behavioral: Negative for behavioral problems, confusion and sleep disturbance.        Objective    /68   Pulse 94   Ht 157.5 cm (62\")   Wt 116 kg (254 lb 14.4 oz)   SpO2 93%   BMI 46.62 kg/m²   Physical Exam   Constitutional: She is oriented to person, place, and time. She appears well-developed and well-nourished. No distress.   HENT:   Head: Normocephalic and atraumatic.   Right Ear: External ear normal.   Left Ear: External ear normal.   Nose: Nose normal.   Eyes: Pupils are equal, round, and reactive to light. Conjunctivae and EOM are normal.   Neck: Normal range of motion. Neck supple. No tracheal deviation present. No thyromegaly present.   Cardiovascular: Normal rate, regular rhythm and normal heart sounds.    No murmur heard.  Pulmonary/Chest: Effort normal and breath sounds normal. No respiratory distress. She has no wheezes.   Abdominal: Soft. Bowel sounds are normal. There is no tenderness. There is no rebound and no guarding. "   Musculoskeletal: Normal range of motion. She exhibits no tenderness or deformity.    Milagros had a diabetic foot exam performed today.   During the foot exam she had a monofilament test performed.    Neurological Sensory Findings -  Altered sharp/dull right ankle/foot discrimination and altered sharp/dull left ankle/foot discrimination. Right ankle/foot altered proprioception and left ankle/foot altered proprioception.  Vascular Status -  Her right foot exhibits abnormal foot edema. Her left foot exhibits abnormal foot edema.  Skin Integrity  -  She has right foot onychomycosis, callous right foot and right heel is dry and cracked.She has left foot onychomycosis, callous left foot and left heel dry and cracked..  Neurological: She is alert and oriented to person, place, and time. No cranial nerve deficit.   Skin: Skin is warm and dry. No rash noted.   Psychiatric: She has a normal mood and affect. Her behavior is normal. Judgment and thought content normal.       Lab Review  No results found for: GLUCOSE, NA, K, CL, CO2, BUN, CREATININE, HGBA1C, TRIG, LDL    Assessment/Plan      1. Uncontrolled type 2 diabetes mellitus with complication, with long-term current use of insulin (CMS/[facility])    2. Hypertension associated with diabetes (CMS/[facility])    3. Hypercholesterolemia    4. Diabetic polyneuropathy associated with diabetes mellitus due to underlying condition (CMS/[facility])    5. Vitamin D deficiency    .    Medications prescribed:  Outpatient Encounter Prescriptions as of 8/28/2018   Medication Sig Dispense Refill   • albuterol (PROVENTIL HFA;VENTOLIN HFA) 108 (90 BASE) MCG/ACT inhaler Inhale  into the lungs as needed.       • aspirin 81 MG tablet Take 81 mg by mouth daily.     • beclomethasone (QVAR) 80 MCG/ACT inhaler Inhale 1 puff into the lungs 2 times daily     • bethanechol (URECHOLINE) 25 MG tablet Take 25 mg by mouth 3 (three) times a day.     • carvedilol (COREG) 25 MG tablet Take 25 mg by mouth.     • DULoxetine  (CYMBALTA) 60 MG capsule Take 60 mg by mouth daily.     • esomeprazole (nexIUM) 40 MG capsule      • furosemide (LASIX) 40 MG tablet Take 40 mg by mouth daily.     • gabapentin (NEURONTIN) 300 MG capsule Take 300 mg by mouth 3 (three) times a day.     • HYDROcodone-acetaminophen (VICODIN) 7.5-500 MG per tablet Take 7.5-500 tablets by mouth every 6 (six) hours.     • Insulin Pen Needle (B-D UF III MINI PEN NEEDLES) 31G X 5 MM misc Use 4 times daily 120 each 11   • Insulin Regular Human, Conc, (HUMULIN R U-500 KWIKPEN) 500 UNIT/ML solution pen-injector CONCENTRATED injection 130 units for bkfast and 90 for supper 5 pen 11   • LORazepam (ATIVAN) 1 MG tablet Take 1 mg by mouth Every 8 (Eight) Hours As Needed for Anxiety.     • potassium chloride (KLOR-CON) 10 MEQ CR tablet Take 10 mEq by mouth 2 (two) times a day.     • SITagliptin-MetFORMIN HCl -1000 MG tablet sustained-release 24 hour One tab po daily 30 tablet 11   • warfarin (COUMADIN) 4 MG tablet Take 4 mg by mouth daily.     • [DISCONTINUED] pravastatin (PRAVACHOL) 80 MG tablet Take 80 mg by mouth daily.     • [DISCONTINUED] telmisartan (MICARDIS) 40 MG tablet Take 40 mg by mouth daily.       No facility-administered encounter medications on file as of 8/28/2018.        Orders placed during this encounter include:  No orders of the defined types were placed in this encounter.    Glycemic Management         lipodystropic diabetes      I would like to obtain leptin and adiponectin     Stopped below regimen      lantus 220      humalog 60 w meals - 70      Changed to U500       Janumet 100/1000 mg xr daily      Next appt consider actos 15 mg daily --- not starting she has CHF      I didn't add trulicity  since having nausea     Humulin U 500         am dose to 120 units --- taking 130 -140        Supper dose increase to 95 units --- taking 90 units         Add jardiance 10 mg daily         Lipid Management        On pravachol and trilipix before      Verify  this      Obtain repeat flp      Blood Pressure Management:              On verapamil and lopressor     on lasix 40 bid    Add jardiance 10 mg daily    Leg edema      Microvascular Complication Monitoring:       Eye Exam Evaluation, verify date     -----------     Last Microalbumin-Proteinuria Assessment     No results found for: MALBCRERATIO     No results found for: UTPCR     -----------        Neuropathy, yes      On cymbalta / neurontin         Immunizations:       Pending to verify      Preventive Care:       I advised the patient of the risks in continuing to use tobacco, and I provided this patient with smoking cessation educational materials.  I also discussed how to quit smoking and the patient has expressed the willingness to quit.       During this visit, I spent 3-10 mintues counseling the patient regarding smoking cessation.            Weight Related:         Wt Readings from Last 3 Encounters:   11/13/17 271 lb (123 kg)      Body mass index is 49.57 kg/(m^2).           Diet interventions: moderate (500 kCal/d) deficit diet.        Bone Health     No results found for: PTH, CALCIUM, CAION, PHOS, [id number]     Thyroid Health     No results found for: TSH                 Other Diabetes Related Aspects         No results found for: [id number]           4. Follow-up: No Follow-up on file.

## 2018-09-06 RX ORDER — PEN NEEDLE, DIABETIC 31 GX5/16"
NEEDLE, DISPOSABLE MISCELLANEOUS
Qty: 150 EACH | Refills: 11 | Status: SHIPPED | OUTPATIENT
Start: 2018-09-06 | End: 2020-06-23 | Stop reason: SDUPTHER

## 2018-09-07 ENCOUNTER — TELEPHONE (OUTPATIENT)
Dept: ENDOCRINOLOGY | Facility: CLINIC | Age: 68
End: 2018-09-07

## 2018-09-07 NOTE — TELEPHONE ENCOUNTER
PT IS NEEDING A REFILL ON HER YEAST INFECTION PLEASE PER DR ACHARYA. PLEASE SEND IT TO [facility]. THANK YOU TP     called in 8.28.18    (Routing comment)

## 2018-09-13 ENCOUNTER — OFFICE VISIT (OUTPATIENT)
Dept: GASTROENTEROLOGY | Facility: CLINIC | Age: 68
End: 2018-09-13

## 2018-09-13 VITALS
BODY MASS INDEX: 44.53 KG/M2 | HEART RATE: 80 BPM | WEIGHT: 242 LBS | DIASTOLIC BLOOD PRESSURE: 62 MMHG | OXYGEN SATURATION: 98 % | SYSTOLIC BLOOD PRESSURE: 138 MMHG | TEMPERATURE: 98.6 F | HEIGHT: 62 IN

## 2018-09-13 DIAGNOSIS — Z79.01 ANTICOAGULANT LONG-TERM USE: ICD-10-CM

## 2018-09-13 DIAGNOSIS — Z86.010 HX OF COLONIC POLYPS: Primary | ICD-10-CM

## 2018-09-13 PROCEDURE — S0260 H&P FOR SURGERY: HCPCS | Performed by: NURSE PRACTITIONER

## 2018-09-13 RX ORDER — ALLOPURINOL 100 MG/1
100 TABLET ORAL DAILY
COMMUNITY

## 2018-09-13 RX ORDER — SODIUM, POTASSIUM,MAG SULFATES 17.5-3.13G
2 SOLUTION, RECONSTITUTED, ORAL ORAL ONCE
Qty: 2 BOTTLE | Refills: 0 | Status: SHIPPED | OUTPATIENT
Start: 2018-09-13 | End: 2018-09-13

## 2018-09-13 RX ORDER — DIGOXIN 250 MCG
250 TABLET ORAL
COMMUNITY

## 2018-09-13 NOTE — PROGRESS NOTES
Chief Complaint   Patient presents with   • Colon Cancer Screening     Patient is here today for colon screening.     Subjective   HPI  Milagros Nance is a 67 y.o. female who presents as a referral for preventative maintenance. She has no complaints of nausea or vomiting. No change in bowels. No wt loss. No BRBPR. No melena. There is no family hx for colon cancer. No abdominal pain. There was no Cologaurd screening this year.  The patient's last colonoscopy was performed on 16 with findings of colon polyps.  Past Medical History:   Diagnosis Date   • Abdominal pain    • Acquired lipodystrophic diabetes 2017   • Afib (CMS/HCC)    • Anxiety    • Jackson's esophagus    • Blood in stool    • Colon polyps    • Depression    • Diabetes mellitus (CMS/HCC)    • Diarrhea    • Hypertension    • Neuropathy    • Osteoarthritis    • Swelling abdomen      Past Surgical History:   Procedure Laterality Date   • CARDIAC CATHETERIZATION Bilateral    • CARPAL TUNNEL RELEASE     •  SECTION     • COLONOSCOPY  2016   • DILATATION AND CURETTAGE     • EXTERNAL EAR SURGERY     • HYSTEROSCOPY         Current Outpatient Prescriptions:   •  albuterol (PROVENTIL HFA;VENTOLIN HFA) 108 (90 BASE) MCG/ACT inhaler, Inhale  into the lungs as needed.  , Disp: , Rfl:   •  allopurinol (ZYLOPRIM) 100 MG tablet, Take 100 mg by mouth Daily., Disp: , Rfl:   •  aspirin 81 MG tablet, Take 81 mg by mouth daily., Disp: , Rfl:   •  beclomethasone (QVAR) 80 MCG/ACT inhaler, Inhale 1 puff into the lungs 2 times daily, Disp: , Rfl:   •  digoxin (LANOXIN) 250 MCG tablet, Take 250 mcg by mouth Daily., Disp: , Rfl:   •  DULoxetine (CYMBALTA) 60 MG capsule, Take 60 mg by mouth daily., Disp: , Rfl:   •  Empagliflozin (JARDIANCE) 10 MG tablet, Take 1 tablet by mouth Daily. Before bkfast, Disp: 30 tablet, Rfl: 11  •  esomeprazole (nexIUM) 40 MG capsule, , Disp: , Rfl:   •  furosemide (LASIX) 40 MG tablet, Take 40 mg by mouth daily., Disp: , Rfl:   •   gabapentin (NEURONTIN) 300 MG capsule, Take 300 mg by mouth 3 (three) times a day., Disp: , Rfl:   •  HYDROcodone-acetaminophen (VICODIN) 7.5-500 MG per tablet, Take 7.5-500 tablets by mouth every 6 (six) hours., Disp: , Rfl:   •  Insulin Pen Needle (B-D UF III MINI PEN NEEDLES) 31G X 5 MM misc, Use 4 times daily, Disp: 120 each, Rfl: 11  •  Insulin Regular Human, Conc, (HUMULIN R U-500 KWIKPEN) 500 UNIT/ML solution pen-injector CONCENTRATED injection, 130 units for bkfast and 90 for supper, Disp: 5 pen, Rfl: 11  •  LORazepam (ATIVAN) 1 MG tablet, Take 1 mg by mouth Every 8 (Eight) Hours As Needed for Anxiety., Disp: , Rfl:   •  potassium chloride (KLOR-CON) 10 MEQ CR tablet, Take 10 mEq by mouth 2 (two) times a day., Disp: , Rfl:   •  SITagliptin-MetFORMIN HCl -1000 MG tablet sustained-release 24 hour, One tab po daily, Disp: 30 tablet, Rfl: 11  •  UNIFINE PENTIPS 31G X 8 MM misc, USE 4 TIMES A DAY FOR INSULIN INJECTION, Disp: 150 each, Rfl: 11  •  warfarin (COUMADIN) 3 MG tablet, Take 3 mg by mouth Daily., Disp: , Rfl:   •  sodium-potassium-magnesium sulfates (SUPREP BOWEL PREP KIT) 17.5-3.13-1.6 GM/180ML solution oral solution, Take 2 bottles by mouth 1 (One) Time for 1 dose. Split dose prep as directed by office instructions provided.  2 bottles = one kit., Disp: 2 bottle, Rfl: 0  Allergies   Allergen Reactions   • Avelox [Moxifloxacin]    • Moxifloxacin Hcl In Nacl      Rash, itching- could not breathe     Social History     Social History   • Marital status: Single     Spouse name: N/A   • Number of children: N/A   • Years of education: N/A     Occupational History   • Not on file.     Social History Main Topics   • Smoking status: Current Every Day Smoker   • Smokeless tobacco: Never Used   • Alcohol use No   • Drug use: Unknown   • Sexual activity: Not on file     Other Topics Concern   • Not on file     Social History Narrative   • No narrative on file     Family History   Problem Relation Age of  "Onset   • Heart disease Father    • Heart disease Sister    • Diabetes Sister    • Colon cancer Neg Hx    • Colon polyps Neg Hx        REVIEW OF SYSTEMS  General: well appearing, no fever chills or sweats, no unexplained wt loss  HEENT: no acute visual or hearing disturbances  Cardiovascular: No chest pain or palpitations  Pulmonary: No shortness of breath, coughing, wheezing or hemoptysis  : No burning, urgency, hematuria, or dysuria  Musculoskeletal: No joint pain or stiffness  Peripheral: no edema  Skin: No lesions or rashes  Neuro: No dizziness, headaches, stroke, syncope  Endocrine: No hot or cold intolerances  Hematological: No blood dyscrasias    Objective   Vitals:    09/13/18 0924   BP: 138/62   Pulse: 80   Temp: 98.6 °F (37 °C)   SpO2: 98%   Weight: 110 kg (242 lb)   Height: 157.5 cm (62\")     Body mass index is 44.26 kg/m².    PHYSICAL EXAM  General: age appropriate well nourished well appearing, no acute distress  Head: normocephalic and atraumatic  Global assessment-supple  Neck-No JVD noted, no lymphadenopathy  Pulmonary-clear to auscultation bilaterally, normal respiratory effort  Cardiovascular-normal rate and rhythm, normal heart sounds, S1 and S2 noted  Abdomen-soft, non tender, non distended, normal bowel sounds all 4 quadrants, no hepatosplenomegaly noted  Extremities-No clubbing cyanosis or edema  Neuro-Non focal, converses appropriately, awake, alert, oriented    Imaging Results (most recent)     None        Assessment/Plan   Milagros was seen today for colon cancer screening.    Diagnoses and all orders for this visit:    Hx of colonic polyps    Anticoagulant long-term use  Comments:  On Coumadin per history of A. fib will need to be held 5 days prior to procedure. Hx of fluid retention recently in [facility] on and was found to have Chronic diastolic heart failure. Will need clearance to undergo colonoscopy and hold coumadin per .       COLONOSCOPY WITH ANESTHESIA " (N/A)       Body mass index is 44.26 kg/m². Patient's Body mass index is 44.26 kg/m². BMI is above normal parameters. Recommendations include: no follow-up required.      All risks, benefits, alternatives, and indications of colonoscopy procedure have been discussed with the patient. Risks to include perforation of the colon requiring possible surgery or colostomy, risk of bleeding from biopsies or removal of colon tissue, possibility of missing a colon polyp or cancer, or adverse drug reaction.  Benefits to include the diagnosis and management of disease of the colon and rectum. Alternatives to include barium enema, radiographic evaluation, lab testing or no intervention. Pt verbalizes understanding and agrees.

## 2018-09-18 ENCOUNTER — TELEPHONE (OUTPATIENT)
Dept: GASTROENTEROLOGY | Facility: CLINIC | Age: 68
End: 2018-09-18

## 2018-09-18 NOTE — TELEPHONE ENCOUNTER
I have received cardiac clearance and Coumadin clearance from Dr. Welch. I have scanned this to you under media in her chart.    [colonoscopy]

## 2018-09-19 NOTE — TELEPHONE ENCOUNTER
OK to schedule procedure now that clearance to hold antiplatelet/anticoagulatant has been obtained.    Kristine Faust MD

## 2018-09-20 PROBLEM — Z79.01 ANTICOAGULANT LONG-TERM USE: Status: ACTIVE | Noted: 2018-09-20

## 2018-09-20 PROBLEM — Z86.010 HX OF COLONIC POLYPS: Status: ACTIVE | Noted: 2018-09-20

## 2018-09-20 NOTE — TELEPHONE ENCOUNTER
I have scheduled patient's colonoscopy for 11/5/18. I explained that the last dose of Coumadin will be 10/30/18. I have mailed prep instructions to her.

## 2018-10-05 RX ORDER — INSULIN HUMAN 500 [IU]/ML
INJECTION, SOLUTION SUBCUTANEOUS
Qty: 12 ML | Refills: 0 | Status: SHIPPED | OUTPATIENT
Start: 2018-10-05 | End: 2018-10-23 | Stop reason: SDUPTHER

## 2018-10-05 RX ORDER — SITAGLIPTIN AND METFORMIN HYDROCHLORIDE 1000; 100 MG/1; MG/1
TABLET, FILM COATED, EXTENDED RELEASE ORAL
Qty: 30 TABLET | Refills: 0 | Status: SHIPPED | OUTPATIENT
Start: 2018-10-05 | End: 2018-11-30 | Stop reason: SDUPTHER

## 2018-10-23 RX ORDER — INSULIN HUMAN 500 [IU]/ML
INJECTION, SOLUTION SUBCUTANEOUS
Qty: 12 ML | Refills: 0 | Status: SHIPPED | OUTPATIENT
Start: 2018-10-23 | End: 2018-11-30 | Stop reason: SDUPTHER

## 2018-11-05 ENCOUNTER — ANESTHESIA EVENT (OUTPATIENT)
Dept: GASTROENTEROLOGY | Facility: HOSPITAL | Age: 68
End: 2018-11-05

## 2018-11-05 ENCOUNTER — HOSPITAL ENCOUNTER (OUTPATIENT)
Facility: HOSPITAL | Age: 68
Setting detail: HOSPITAL OUTPATIENT SURGERY
Discharge: HOME | End: 2018-11-05
Attending: INTERNAL MEDICINE | Admitting: INTERNAL MEDICINE

## 2018-11-05 ENCOUNTER — ANESTHESIA (OUTPATIENT)
Dept: GASTROENTEROLOGY | Facility: HOSPITAL | Age: 68
End: 2018-11-05

## 2018-11-05 VITALS
BODY MASS INDEX: 44.72 KG/M2 | OXYGEN SATURATION: 90 % | RESPIRATION RATE: 16 BRPM | WEIGHT: 243 LBS | TEMPERATURE: 98.1 F | HEART RATE: 81 BPM | DIASTOLIC BLOOD PRESSURE: 76 MMHG | HEIGHT: 62 IN | SYSTOLIC BLOOD PRESSURE: 109 MMHG

## 2018-11-05 DIAGNOSIS — Z79.01 ANTICOAGULANT LONG-TERM USE: ICD-10-CM

## 2018-11-05 DIAGNOSIS — Z86.010 HX OF COLONIC POLYPS: ICD-10-CM

## 2018-11-05 LAB — GLUCOSE BLDC GLUCOMTR-MCNC: 200 MG/DL (ref 70–130)

## 2018-11-05 PROCEDURE — 45385 COLONOSCOPY W/LESION REMOVAL: CPT | Performed by: INTERNAL MEDICINE

## 2018-11-05 PROCEDURE — 45380 COLONOSCOPY AND BIOPSY: CPT | Performed by: INTERNAL MEDICINE

## 2018-11-05 PROCEDURE — 25010000002 PROPOFOL 10 MG/ML EMULSION: Performed by: NURSE ANESTHETIST, CERTIFIED REGISTERED

## 2018-11-05 PROCEDURE — 82962 GLUCOSE BLOOD TEST: CPT

## 2018-11-05 PROCEDURE — 88305 TISSUE EXAM BY PATHOLOGIST: CPT | Performed by: INTERNAL MEDICINE

## 2018-11-05 RX ORDER — SODIUM CHLORIDE 9 MG/ML
500 INJECTION, SOLUTION INTRAVENOUS CONTINUOUS PRN
Status: DISCONTINUED | OUTPATIENT
Start: 2018-11-05 | End: 2018-11-05 | Stop reason: HOSPADM

## 2018-11-05 RX ORDER — PROPOFOL 10 MG/ML
VIAL (ML) INTRAVENOUS AS NEEDED
Status: DISCONTINUED | OUTPATIENT
Start: 2018-11-05 | End: 2018-11-05 | Stop reason: SURG

## 2018-11-05 RX ORDER — SODIUM CHLORIDE 0.9 % (FLUSH) 0.9 %
3 SYRINGE (ML) INJECTION AS NEEDED
Status: DISCONTINUED | OUTPATIENT
Start: 2018-11-05 | End: 2018-11-05 | Stop reason: HOSPADM

## 2018-11-05 RX ADMIN — PROPOFOL 50 MG: 10 INJECTION, EMULSION INTRAVENOUS at 11:13

## 2018-11-05 RX ADMIN — PROPOFOL 50 MG: 10 INJECTION, EMULSION INTRAVENOUS at 10:59

## 2018-11-05 RX ADMIN — PROPOFOL 50 MG: 10 INJECTION, EMULSION INTRAVENOUS at 10:57

## 2018-11-05 RX ADMIN — PROPOFOL 50 MG: 10 INJECTION, EMULSION INTRAVENOUS at 10:56

## 2018-11-05 RX ADMIN — LIDOCAINE HYDROCHLORIDE 0.5 ML: 10 INJECTION, SOLUTION EPIDURAL; INFILTRATION; INTRACAUDAL; PERINEURAL at 09:08

## 2018-11-05 RX ADMIN — PROPOFOL 50 MG: 10 INJECTION, EMULSION INTRAVENOUS at 11:02

## 2018-11-05 RX ADMIN — PROPOFOL 50 MG: 10 INJECTION, EMULSION INTRAVENOUS at 11:09

## 2018-11-05 RX ADMIN — PROPOFOL 50 MG: 10 INJECTION, EMULSION INTRAVENOUS at 11:03

## 2018-11-05 RX ADMIN — PROPOFOL 50 MG: 10 INJECTION, EMULSION INTRAVENOUS at 11:21

## 2018-11-05 RX ADMIN — PROPOFOL 50 MG: 10 INJECTION, EMULSION INTRAVENOUS at 11:28

## 2018-11-05 RX ADMIN — PROPOFOL 50 MG: 10 INJECTION, EMULSION INTRAVENOUS at 11:16

## 2018-11-05 RX ADMIN — PROPOFOL 50 MG: 10 INJECTION, EMULSION INTRAVENOUS at 11:11

## 2018-11-05 RX ADMIN — PROPOFOL 50 MG: 10 INJECTION, EMULSION INTRAVENOUS at 11:18

## 2018-11-05 RX ADMIN — PROPOFOL 50 MG: 10 INJECTION, EMULSION INTRAVENOUS at 11:05

## 2018-11-05 RX ADMIN — SODIUM CHLORIDE 500 ML: 9 INJECTION, SOLUTION INTRAVENOUS at 09:08

## 2018-11-05 RX ADMIN — PROPOFOL 50 MG: 10 INJECTION, EMULSION INTRAVENOUS at 11:06

## 2018-11-05 RX ADMIN — PROPOFOL 50 MG: 10 INJECTION, EMULSION INTRAVENOUS at 11:24

## 2018-11-05 RX ADMIN — PROPOFOL 50 MG: 10 INJECTION, EMULSION INTRAVENOUS at 10:58

## 2018-11-05 RX ADMIN — PROPOFOL 50 MG: 10 INJECTION, EMULSION INTRAVENOUS at 11:26

## 2018-11-05 NOTE — ANESTHESIA POSTPROCEDURE EVALUATION
Patient: Milagros Nance    Procedure Summary     Date:  11/05/18 Room / Location:  [facility] / [facility]    Anesthesia Start:  1052 Anesthesia Stop:  1129    Procedure:  COLONOSCOPY WITH ANESTHESIA (N/A ) Diagnosis:       Hx of colonic polyps      Anticoagulant long-term use      (Hx of colonic polyps [Z86.010])      (Anticoagulant long-term use [Z79.01])    Surgeon:  Kristine Faust MD Provider:  Rex De La O CRNA    Anesthesia Type:  general ASA Status:  3          Anesthesia Type: general  Last vitals  BP   109/76 (11/05/18 1150)   Temp   98.1 °F (36.7 °C) (11/05/18 0851)   Pulse   81 (11/05/18 1150)   Resp   16 (11/05/18 1150)     SpO2   90 % (11/05/18 1150)     Post Anesthesia Care and Evaluation    Patient location during evaluation: PHASE II  Patient participation: complete - patient participated  Level of consciousness: awake and alert  Pain management: adequate  Airway patency: patent  Anesthetic complications: No anesthetic complications  PONV Status: none  Cardiovascular status: acceptable and stable  Respiratory status: acceptable and unassisted  Hydration status: acceptable

## 2018-11-05 NOTE — H&P
Chief Complaint:   Multiple colon polyps    Subjective     HPI:   She had over 9 polyps removed just over 2 years ago.  She is here for ongoing surveillance.    Upon arrival to the endoscopy unit however she tells me she has had diarrhea on a daily basis for the last 3 months.  She wishes to see with this colonoscopy as a diagnostic study due to her complaints.    Past Medical History:   Past Medical History:   Diagnosis Date   • Abdominal pain    • Acquired lipodystrophic diabetes 2017   • Afib (CMS/HCC)    • Anxiety    • Jackson's esophagus    • Blood in stool    • Colon polyps    • Depression    • Diabetes mellitus (CMS/HCC)    • Diarrhea    • Hypertension    • Neuropathy    • Osteoarthritis    • Swelling abdomen        Past Surgical History:  Past Surgical History:   Procedure Laterality Date   • CARDIAC CATHETERIZATION Bilateral    • CARPAL TUNNEL RELEASE     •  SECTION     • COLONOSCOPY  2016   • DILATATION AND CURETTAGE     • EXTERNAL EAR SURGERY     • HYSTEROSCOPY          Family History:  Family History   Problem Relation Age of Onset   • Heart disease Father    • Heart disease Sister    • Diabetes Sister    • Colon cancer Neg Hx    • Colon polyps Neg Hx        Social History:   reports that she has been smoking.  She has never used smokeless tobacco. She reports that she does not drink alcohol or use drugs.    Medications:   Prescriptions Prior to Admission   Medication Sig Dispense Refill Last Dose   • allopurinol (ZYLOPRIM) 100 MG tablet Take 100 mg by mouth Daily.   2018 at Unknown time   • aspirin 81 MG tablet Take 81 mg by mouth daily.   Past Week at Unknown time   • digoxin (LANOXIN) 250 MCG tablet Take 250 mcg by mouth Daily.   2018 at Unknown time   • DULoxetine (CYMBALTA) 60 MG capsule Take 60 mg by mouth daily.   2018 at Unknown time   • Empagliflozin (JARDIANCE) 10 MG tablet Take 1 tablet by mouth Daily. Before bkfast 30 tablet 11 2018 at Unknown time  "  • esomeprazole (nexIUM) 40 MG capsule    11/4/2018 at Unknown time   • furosemide (LASIX) 40 MG tablet Take 40 mg by mouth daily.   11/4/2018 at Unknown time   • gabapentin (NEURONTIN) 300 MG capsule Take 300 mg by mouth 3 (three) times a day.   11/4/2018 at Unknown time   • HUMULIN R U-500 KWIKPEN 500 UNIT/ML solution pen-injector CONCENTRATED injection INJECT 130 UNITS SUB-Q FOR BREAKFAST AND 90 UNITS FOR SUPPER 12 mL 0 11/4/2018 at Unknown time   • Insulin Pen Needle (B-D UF III MINI PEN NEEDLES) 31G X 5 MM misc Use 4 times daily 120 each 11 11/4/2018 at Unknown time   • JANUMET -1000 MG tablet TAKE 1 TABLET BY MOUTH DAILY 30 tablet 0 11/4/2018 at Unknown time   • LORazepam (ATIVAN) 1 MG tablet Take 1 mg by mouth Every 8 (Eight) Hours As Needed for Anxiety.   11/4/2018 at Unknown time   • potassium chloride (KLOR-CON) 10 MEQ CR tablet Take 10 mEq by mouth 2 (two) times a day.   11/4/2018 at Unknown time   • UNIFINE PENTIPS 31G X 8 MM misc USE 4 TIMES A DAY FOR INSULIN INJECTION 150 each 11 11/4/2018 at Unknown time   • albuterol (PROVENTIL HFA;VENTOLIN HFA) 108 (90 BASE) MCG/ACT inhaler Inhale  into the lungs as needed.     More than a month at Unknown time   • beclomethasone (QVAR) 80 MCG/ACT inhaler Inhale 1 puff into the lungs 2 times daily   More than a month at Unknown time   • HYDROcodone-acetaminophen (VICODIN) 7.5-500 MG per tablet Take 7.5-500 tablets by mouth every 6 (six) hours.   11/3/2018   • warfarin (COUMADIN) 3 MG tablet Take 3 mg by mouth Daily.   10/30/2018       Allergies:  Avelox [moxifloxacin] and Moxifloxacin hcl in nacl    ROS:    General: Weight stable  Resp: No SOA  Cardiovascular: No CP      Objective     /76 (BP Location: Right arm, Patient Position: Sitting)   Pulse 99   Temp 98.1 °F (36.7 °C) (Temporal Artery )   Resp 22   Ht 157.5 cm (62\")   Wt 110 kg (243 lb)   SpO2 93%   BMI 44.45 kg/m²     Physical Exam   Constitutional: Pt is oriented to person, place, and in " no distress.  Eyes: No icterus  ENMT: Unremarkable   Cardiovascular: Normal rate, regular rhythm.    Pulmonary/Chest: No distress.  No audible wheezes  Abdominal: Soft.   Skin: Skin is warm and dry.   Psychiatric: Mood, memory, affect and judgment appear normal.     Assessment/Plan     Diagnosis:  Diarrhea  History of Colon polyps    Anticipated Surgical Procedure:  Colonoscopy    The risks, benefits, and alternatives of colonoscopy were reviewed with the patient today.  Risks including perforation of the colon possibly requiring surgery or colostomy.  Additional risks include risk of bleeding from biopsies or removal of colon tissue.  There is also the risk of a drug reaction or problems with anesthesia.  This will be discussed with the further by the anesthesia team on the day of the procedure.  Lastly there is a possibility of missing a colon polyp or cancer.  The benefits include the diagnosis and management of disease of the colon and rectum.  Alternatives to colonoscopy include barium enema, laboratory testing, radiographic evaluation, or no intervention.  The patient verbalizes understanding and agrees.    Much of this encounter note is an electronic transcription/translation of spoken language to printed text. The electronic translation of spoken language may permit erroneous, or at times, nonsensical words or phrases to be inadvertently transcribed; although I have reviewed the note for such errors, some may still exist.

## 2018-11-05 NOTE — ANESTHESIA PREPROCEDURE EVALUATION
Anesthesia Evaluation     Patient summary reviewed   no history of anesthetic complications:  NPO Solid Status: > 8 hours  NPO Liquid Status: > 4 hours           Airway   Mallampati: III  TM distance: <3 FB  Neck ROM: full  No difficulty expected  Dental    (+) poor dentition        Pulmonary    (+) a smoker Current Smoked day of surgery, COPD, sleep apnea (bipap with 02),   Cardiovascular   Exercise tolerance: poor (<4 METS)    PT is on anticoagulation therapy    (+) hypertension (patient denies), CAD (nonocclusive), dysrhythmias Atrial Fib, CHF, hyperlipidemia,   (-) past MI, cardiac stents    ROS comment: Last coumadin 10/30    Neuro/Psych  (+) TIA, CVA (12 years ago),     (-) seizures  GI/Hepatic/Renal/Endo    (+) morbid obesity, GERD,  diabetes mellitus using insulin,   (-) liver disease, no renal disease    Musculoskeletal     Abdominal    Substance History      OB/GYN          Other                        Anesthesia Plan    ASA 3     general   total IV anesthesia  intravenous induction   Anesthetic plan, all risks, benefits, and alternatives have been provided, discussed and informed consent has been obtained with: patient.

## 2018-11-07 LAB
CYTO UR: NORMAL
LAB AP CASE REPORT: NORMAL
LAB AP CLINICAL INFORMATION: NORMAL
PATH REPORT.FINAL DX SPEC: NORMAL
PATH REPORT.GROSS SPEC: NORMAL

## 2018-11-08 ENCOUNTER — TELEPHONE (OUTPATIENT)
Dept: GASTROENTEROLOGY | Facility: CLINIC | Age: 68
End: 2018-11-08

## 2018-11-08 NOTE — TELEPHONE ENCOUNTER
----- Message from Kristine Faust MD sent at 11/8/2018 10:17 AM CST -----  I am writing to inform you that the polyps removed from the colon did not have any cancer. They no longer pose a threat to you since they were completely removed.  However, in the future, it is possible that additional polyps might grow.  For this reason, we will repeat colonoscopy in three years as planned.  Also, your colon biopsies were normal.    If you have any questions, please call our office.    Sincerely,        Kristine Faust MD

## 2018-11-30 ENCOUNTER — OFFICE VISIT (OUTPATIENT)
Dept: ENDOCRINOLOGY | Facility: CLINIC | Age: 68
End: 2018-11-30

## 2018-11-30 VITALS
HEART RATE: 85 BPM | DIASTOLIC BLOOD PRESSURE: 82 MMHG | HEIGHT: 62 IN | OXYGEN SATURATION: 97 % | SYSTOLIC BLOOD PRESSURE: 130 MMHG | WEIGHT: 239.6 LBS | BODY MASS INDEX: 44.09 KG/M2

## 2018-11-30 DIAGNOSIS — E78.00 HYPERCHOLESTEROLEMIA: ICD-10-CM

## 2018-11-30 DIAGNOSIS — E55.9 VITAMIN D DEFICIENCY: ICD-10-CM

## 2018-11-30 DIAGNOSIS — E08.42 DIABETIC POLYNEUROPATHY ASSOCIATED WITH DIABETES MELLITUS DUE TO UNDERLYING CONDITION (HCC): Primary | ICD-10-CM

## 2018-11-30 DIAGNOSIS — E11.59 HYPERTENSION ASSOCIATED WITH DIABETES (HCC): ICD-10-CM

## 2018-11-30 DIAGNOSIS — IMO0002 UNCONTROLLED TYPE 2 DIABETES MELLITUS WITH COMPLICATION, WITH LONG-TERM CURRENT USE OF INSULIN: ICD-10-CM

## 2018-11-30 DIAGNOSIS — I15.2 HYPERTENSION ASSOCIATED WITH DIABETES (HCC): ICD-10-CM

## 2018-11-30 LAB
GLUCOSE BLDC GLUCOMTR-MCNC: 120 MG/DL (ref 70–130)
HBA1C MFR BLD: 8.2 %

## 2018-11-30 PROCEDURE — 99214 OFFICE O/P EST MOD 30 MIN: CPT | Performed by: INTERNAL MEDICINE

## 2018-11-30 PROCEDURE — 82962 GLUCOSE BLOOD TEST: CPT | Performed by: INTERNAL MEDICINE

## 2018-11-30 NOTE — PROGRESS NOTES
"  Subjective    Milagros Nance is a 68 y.o. female. she is here today for follow-up.    Diabetes   Pertinent negatives for hypoglycemia include no confusion, dizziness, headaches, pallor or tremors. Pertinent negatives for diabetes include no chest pain, no fatigue, no polydipsia, no polyphagia, no polyuria and no weakness.            Primary Care Provider     KRISTOPHER Tomlin     Duration 10 years     Timing - Diabetes is Constant     Quality -  needs improvement - has improved      Severity -  severe     Complications - peripheral neuropathy     Current symptoms/problems  increase appetite, polydipsia and polyuria      Alleviating Factors: Compliance       Side Effects  none     Current diet  in general, a \"healthy\" diet       Current exercise none     Current monitoring regimen: home blood tests - 34 times daily       Lab Results   Component Value Date    HGBA1C 8.2 10/30/2018         Home blood sugar records:      Checking 4 x daily     bg range 50 to 350      Hypoglycemia none           The following portions of the patient's history were reviewed and updated as appropriate:   Past Medical History:   Diagnosis Date   • Abdominal pain    • Acquired lipodystrophic diabetes 2017   • Afib (CMS/HCC)    • Anxiety    • Jackson's esophagus    • Blood in stool    • Colon polyps    • Depression    • Diabetes mellitus (CMS/HCC)    • Diarrhea    • Hypertension    • Neuropathy    • Osteoarthritis    • Swelling abdomen      Past Surgical History:   Procedure Laterality Date   • CARDIAC CATHETERIZATION Bilateral    • CARPAL TUNNEL RELEASE     •  SECTION     • COLONOSCOPY  2016   • DILATATION AND CURETTAGE     • EXTERNAL EAR SURGERY     • HYSTEROSCOPY       Family History   Problem Relation Age of Onset   • Heart disease Father    • Heart disease Sister    • Diabetes Sister    • Colon cancer Neg Hx    • Colon polyps Neg Hx      OB History     No data available        Current Outpatient Medications "   Medication Sig Dispense Refill   • albuterol (PROVENTIL HFA;VENTOLIN HFA) 108 (90 BASE) MCG/ACT inhaler Inhale  into the lungs as needed.       • allopurinol (ZYLOPRIM) 100 MG tablet Take 100 mg by mouth Daily.     • aspirin 81 MG tablet Take 81 mg by mouth daily.     • beclomethasone (QVAR) 80 MCG/ACT inhaler Inhale 1 puff into the lungs 2 times daily     • digoxin (LANOXIN) 250 MCG tablet Take 250 mcg by mouth Daily.     • DULoxetine (CYMBALTA) 60 MG capsule Take 60 mg by mouth daily.     • Empagliflozin (JARDIANCE) 10 MG tablet Take 1 tablet by mouth Daily. Before bkfast 30 tablet 11   • esomeprazole (nexIUM) 40 MG capsule      • furosemide (LASIX) 40 MG tablet Take 40 mg by mouth daily.     • gabapentin (NEURONTIN) 300 MG capsule Take 300 mg by mouth 3 (three) times a day.     • HUMULIN R U-500 KWIKPEN 500 UNIT/ML solution pen-injector CONCENTRATED injection INJECT 130 UNITS SUB-Q FOR BREAKFAST AND 90 UNITS FOR SUPPER 12 mL 0   • HYDROcodone-acetaminophen (VICODIN) 7.5-500 MG per tablet Take 7.5-500 tablets by mouth every 6 (six) hours.     • Insulin Pen Needle (B-D UF III MINI PEN NEEDLES) 31G X 5 MM misc Use 4 times daily 120 each 11   • JANUMET -1000 MG tablet TAKE 1 TABLET BY MOUTH DAILY 30 tablet 0   • LORazepam (ATIVAN) 1 MG tablet Take 1 mg by mouth Every 8 (Eight) Hours As Needed for Anxiety.     • potassium chloride (KLOR-CON) 10 MEQ CR tablet Take 10 mEq by mouth 2 (two) times a day.     • UNIFINE PENTIPS 31G X 8 MM misc USE 4 TIMES A DAY FOR INSULIN INJECTION 150 each 11   • warfarin (COUMADIN) 3 MG tablet Take 3 mg by mouth Daily.       No current facility-administered medications for this visit.      Allergies   Allergen Reactions   • Avelox [Moxifloxacin]    • Moxifloxacin Hcl In Nacl      Rash, itching- could not breathe     Social History     Socioeconomic History   • Marital status: Single     Spouse name: Not on file   • Number of children: Not on file   • Years of education: Not on  "file   • Highest education level: Not on file   Tobacco Use   • Smoking status: Current Every Day Smoker   • Smokeless tobacco: Never Used   Substance and Sexual Activity   • Alcohol use: No   • Drug use: No   • Sexual activity: Defer       Review of Systems  Review of Systems   Constitutional: Negative for activity change, appetite change, diaphoresis and fatigue.   HENT: Negative for facial swelling, sneezing, sore throat, tinnitus, trouble swallowing and voice change.    Eyes: Negative for photophobia, pain, discharge, redness, itching and visual disturbance.   Respiratory: Negative for apnea, cough, choking, chest tightness and shortness of breath.    Cardiovascular: Negative for chest pain, palpitations and leg swelling.   Gastrointestinal: Negative for abdominal distention, abdominal pain, constipation, diarrhea, nausea and vomiting.   Endocrine: Negative for cold intolerance, heat intolerance, polydipsia, polyphagia and polyuria.   Genitourinary: Negative for difficulty urinating, dysuria, frequency, hematuria and urgency.   Musculoskeletal: Negative for arthralgias, back pain, gait problem, joint swelling, myalgias, neck pain and neck stiffness.   Skin: Negative for color change, pallor, rash and wound.   Neurological: Negative for dizziness, tremors, weakness, light-headedness, numbness and headaches.   Hematological: Negative for adenopathy. Does not bruise/bleed easily.   Psychiatric/Behavioral: Negative for behavioral problems, confusion and sleep disturbance.        Objective    /82   Pulse 85   Ht 157.5 cm (62\")   Wt 109 kg (239 lb 9.6 oz)   SpO2 97%   BMI 43.82 kg/m²   Physical Exam   Constitutional: She is oriented to person, place, and time. She appears well-developed and well-nourished. No distress.   HENT:   Head: Normocephalic and atraumatic.   Right Ear: External ear normal.   Left Ear: External ear normal.   Nose: Nose normal.   Eyes: Conjunctivae and EOM are normal. Pupils are " equal, round, and reactive to light.   Neck: Normal range of motion. Neck supple. No tracheal deviation present. No thyromegaly present.   Cardiovascular: Normal rate, regular rhythm and normal heart sounds.   No murmur heard.  Pulmonary/Chest: Effort normal and breath sounds normal. No respiratory distress. She has no wheezes.   Abdominal: Soft. Bowel sounds are normal. There is no tenderness. There is no rebound and no guarding.   Musculoskeletal: Normal range of motion. She exhibits no tenderness or deformity.    Milagros had a diabetic foot exam performed today.   During the foot exam she had a monofilament test performed.    Neurological Sensory Findings -  Altered sharp/dull right ankle/foot discrimination and altered sharp/dull left ankle/foot discrimination. Right ankle/foot altered proprioception and left ankle/foot altered proprioception.  Vascular Status -  Her right foot exhibits abnormal foot edema. Her left foot exhibits abnormal foot edema.  Skin Integrity  -  She has right foot onychomycosis, callous right foot and right heel is dry and cracked.She has left foot onychomycosis, callous left foot and left heel dry and cracked..  Neurological: She is alert and oriented to person, place, and time. No cranial nerve deficit.   Skin: Skin is warm and dry. No rash noted.   Psychiatric: She has a normal mood and affect. Her behavior is normal. Judgment and thought content normal.       Lab Review  Hemoglobin A1C (no units)   Date Value   10/30/2018 8.2       Assessment/Plan      1. Diabetic polyneuropathy associated with diabetes mellitus due to underlying condition (CMS/[facility])    2. Uncontrolled type 2 diabetes mellitus with complication, with long-term current use of insulin (CMS/[facility])    3. Hypertension associated with diabetes (CMS/[facility])    4. Hypercholesterolemia    5. Vitamin D deficiency    .    Medications prescribed:  Outpatient Encounter Medications as of 11/30/2018   Medication Sig Dispense Refill   •  albuterol (PROVENTIL HFA;VENTOLIN HFA) 108 (90 BASE) MCG/ACT inhaler Inhale  into the lungs as needed.       • allopurinol (ZYLOPRIM) 100 MG tablet Take 100 mg by mouth Daily.     • aspirin 81 MG tablet Take 81 mg by mouth daily.     • beclomethasone (QVAR) 80 MCG/ACT inhaler Inhale 1 puff into the lungs 2 times daily     • digoxin (LANOXIN) 250 MCG tablet Take 250 mcg by mouth Daily.     • DULoxetine (CYMBALTA) 60 MG capsule Take 60 mg by mouth daily.     • Empagliflozin (JARDIANCE) 10 MG tablet Take 1 tablet by mouth Daily. Before bkfast 30 tablet 11   • esomeprazole (nexIUM) 40 MG capsule      • furosemide (LASIX) 40 MG tablet Take 40 mg by mouth daily.     • gabapentin (NEURONTIN) 300 MG capsule Take 300 mg by mouth 3 (three) times a day.     • HUMULIN R U-500 KWIKPEN 500 UNIT/ML solution pen-injector CONCENTRATED injection INJECT 130 UNITS SUB-Q FOR BREAKFAST AND 90 UNITS FOR SUPPER 12 mL 0   • HYDROcodone-acetaminophen (VICODIN) 7.5-500 MG per tablet Take 7.5-500 tablets by mouth every 6 (six) hours.     • Insulin Pen Needle (B-D UF III MINI PEN NEEDLES) 31G X 5 MM misc Use 4 times daily 120 each 11   • JANUMET -1000 MG tablet TAKE 1 TABLET BY MOUTH DAILY 30 tablet 0   • LORazepam (ATIVAN) 1 MG tablet Take 1 mg by mouth Every 8 (Eight) Hours As Needed for Anxiety.     • potassium chloride (KLOR-CON) 10 MEQ CR tablet Take 10 mEq by mouth 2 (two) times a day.     • UNIFINE PENTIPS 31G X 8 MM misc USE 4 TIMES A DAY FOR INSULIN INJECTION 150 each 11   • warfarin (COUMADIN) 3 MG tablet Take 3 mg by mouth Daily.       No facility-administered encounter medications on file as of 11/30/2018.        Orders placed during this encounter include:  Orders Placed This Encounter   Procedures   • Hemoglobin A1c     This order was created through External Result Entry   • POC Glucose     Glycemic Management         lipodystropic diabetes           Janumet 100/1000 mg xr daily      No actos since  she has CHF      I  didn't add trulicity  since having nausea     Humulin U 500         am dose to 120 units --- taking 130        Supper dose increase to 95 units --- taking 90 units     At lunch and supper , additional 30 units if readings are more than 250        jardiance 10 mg daily       Approve for personal marianne    #1  Patient has diabetes mellitus, insulin-dependent.    #2 She performs blood glucose testing 4 times daily and blood glucose log was brought to office with variability from     #3  She is requiring  Humulin u 500 , 4 x daily   #4 Patient tests blood sugars 4 times daily and makes frequent self-adjustments and patient is injecting insulin 4 times daily. She has been doing this for more than 6 months . She tests frequently due to hypoglycemia and hyperglycemia.     #5 I have personally seen patient within the past 6 months    #6 We plan on seeing her every 2-3 months for continuous adjustment of her diabetes regimen     #7 patient has hypoglycemia with episodes of unawareness.    #8 patient has day-to-day variation in her mealtime which confounds the degree of insulin dosing with multiple daily injections.    #9 patient has completed diabetes education program with us.    #10 she has demonstrated the ability to self monitor her glucose.        #11 Patient is motivated in improving  diabetes control     Lipid Management        On pravachol and trilipix before    off of it       Blood Pressure Management:              On verapamil and lopressor     on lasix 40 bid    Add jardiance 10 mg daily    Leg edema      Microvascular Complication Monitoring:       Eye Exam Evaluation, verify date     -----------     Last Microalbumin-Proteinuria Assessment     No results found for: MALBCRERATIO     No results found for: UTPCR     -----------        Neuropathy, yes      On cymbalta / neurontin         Immunizations:       Pending to verify      Preventive Care:       I advised the patient of the risks in continuing to use  tobacco, and I provided this patient with smoking cessation educational materials.  I also discussed how to quit smoking and the patient has expressed the willingness to quit.       During this visit, I spent 3-10 mintues counseling the patient regarding smoking cessation.            Weight Related:         Wt Readings from Last 3 Encounters:   11/13/17 271 lb (123 kg)      Body mass index is 49.57 kg/(m^2).           Diet interventions: moderate (500 kCal/d) deficit diet.        Bone Health     No results found for: PTH, CALCIUM, CAION, PHOS, [id number]     Thyroid Health     No results found for: TSH                 Other Diabetes Related Aspects         No results found for: [id number]           4. Follow-up: No Follow-up on file.

## 2018-12-20 ENCOUNTER — TELEPHONE (OUTPATIENT)
Dept: ENDOCRINOLOGY | Facility: CLINIC | Age: 68
End: 2018-12-20

## 2018-12-20 NOTE — TELEPHONE ENCOUNTER
Pt received a Freestyle monitor in the mail, she ha ssome questions. Pt also scheduled for a year out and is now wondering if she heard Dr. Padron correctly. There are no notes in her check out, can you verify if 1 yr is okay with him.  Please call, thanks!

## 2018-12-21 ENCOUNTER — TELEPHONE (OUTPATIENT)
Dept: ENDOCRINOLOGY | Facility: CLINIC | Age: 68
End: 2018-12-21

## 2018-12-21 NOTE — TELEPHONE ENCOUNTER
She has rcvd the [facility]. She will also make appt for 4 months and waiting to hear from Dr. Andersen office about appt. Says pend in chart

## 2019-04-24 ENCOUNTER — OFFICE VISIT (OUTPATIENT)
Dept: ENDOCRINOLOGY | Facility: CLINIC | Age: 69
End: 2019-04-24

## 2019-04-24 VITALS
WEIGHT: 235.8 LBS | OXYGEN SATURATION: 97 % | DIASTOLIC BLOOD PRESSURE: 84 MMHG | HEIGHT: 62 IN | HEART RATE: 82 BPM | SYSTOLIC BLOOD PRESSURE: 128 MMHG | BODY MASS INDEX: 43.39 KG/M2

## 2019-04-24 DIAGNOSIS — E78.00 HYPERCHOLESTEROLEMIA: ICD-10-CM

## 2019-04-24 DIAGNOSIS — I15.2 HYPERTENSION ASSOCIATED WITH DIABETES (HCC): ICD-10-CM

## 2019-04-24 DIAGNOSIS — E11.59 HYPERTENSION ASSOCIATED WITH DIABETES (HCC): ICD-10-CM

## 2019-04-24 DIAGNOSIS — E08.42 DIABETIC POLYNEUROPATHY ASSOCIATED WITH DIABETES MELLITUS DUE TO UNDERLYING CONDITION (HCC): Primary | ICD-10-CM

## 2019-04-24 LAB — HBA1C MFR BLD: 8 %

## 2019-04-24 PROCEDURE — 95249 CONT GLUC MNTR PT PROV EQP: CPT | Performed by: INTERNAL MEDICINE

## 2019-04-24 PROCEDURE — 99214 OFFICE O/P EST MOD 30 MIN: CPT | Performed by: INTERNAL MEDICINE

## 2019-04-24 PROCEDURE — 83036 HEMOGLOBIN GLYCOSYLATED A1C: CPT | Performed by: INTERNAL MEDICINE

## 2019-04-24 RX ORDER — DOXYCYCLINE HYCLATE 100 MG/1
100 CAPSULE ORAL DAILY
COMMUNITY
Start: 2019-04-22 | End: 2022-06-29

## 2019-04-24 NOTE — PROGRESS NOTES
"  Subjective    Milagros Nance is a 68 y.o. female. she is here today for follow-up.    Diabetes   Pertinent negatives for hypoglycemia include no confusion, dizziness, headaches, pallor or tremors. Pertinent negatives for diabetes include no chest pain, no fatigue, no polydipsia, no polyphagia, no polyuria and no weakness.            Primary Care Provider     KRISTOPHER Tomlin     Duration 10 years     Timing - Diabetes is Constant     Quality -  at goal      Severity -  severe     Complications - peripheral neuropathy     Current symptoms/problems  increase appetite, polydipsia and polyuria      Alleviating Factors: Compliance       Side Effects  none     Current diet  in general, a \"healthy\" diet       Current exercise none     Current monitoring regimen: home blood tests -   4 times daily       Lab Results   Component Value Date    HGBA1C 8.00 2019         Home blood sugar records:      Personal marianne from [facility]  to  reviewed    avg 154 , in ragne 64% , betlow 70 = 3%     Hypoglycemia nocturnal          The following portions of the patient's history were reviewed and updated as appropriate:   Past Medical History:   Diagnosis Date   • Abdominal pain    • Acquired lipodystrophic diabetes 2017   • Afib (CMS/HCC)    • Anxiety    • Jackson's esophagus    • Blood in stool    • Colon polyps    • Depression    • Diabetes mellitus (CMS/HCC)    • Diarrhea    • Hypertension    • Neuropathy    • Osteoarthritis    • Swelling abdomen      Past Surgical History:   Procedure Laterality Date   • CARDIAC CATHETERIZATION Bilateral    • CARPAL TUNNEL RELEASE     •  SECTION     • COLONOSCOPY  2016   • COLONOSCOPY N/A 2018    Procedure: COLONOSCOPY WITH ANESTHESIA;  Surgeon: Kristine Faust MD;  Location: [facility] ENDOSCOPY;  Service: Gastroenterology   • DILATATION AND CURETTAGE     • EXTERNAL EAR SURGERY     • HYSTEROSCOPY       Family History   Problem Relation Age of Onset   • Heart " disease Father    • Heart disease Sister    • Diabetes Sister    • Colon cancer Neg Hx    • Colon polyps Neg Hx      OB History     No data available        Current Outpatient Medications   Medication Sig Dispense Refill   • albuterol (PROVENTIL HFA;VENTOLIN HFA) 108 (90 BASE) MCG/ACT inhaler Inhale  into the lungs as needed.       • allopurinol (ZYLOPRIM) 100 MG tablet Take 100 mg by mouth Daily.     • aspirin 81 MG tablet Take 81 mg by mouth daily.     • beclomethasone (QVAR) 80 MCG/ACT inhaler Inhale 1 puff into the lungs 2 times daily     • digoxin (LANOXIN) 250 MCG tablet Take 250 mcg by mouth Daily.     • doxycycline (VIBRAMYCIN) 100 MG capsule Take 100 mg by mouth Daily.     • DULoxetine (CYMBALTA) 60 MG capsule Take 60 mg by mouth daily.     • Empagliflozin (JARDIANCE) 10 MG tablet Take 1 tablet by mouth Daily. Before bkfast 30 tablet 11   • esomeprazole (nexIUM) 40 MG capsule      • furosemide (LASIX) 40 MG tablet Take 40 mg by mouth daily.     • gabapentin (NEURONTIN) 300 MG capsule Take 300 mg by mouth 3 (three) times a day.     • HYDROcodone-acetaminophen (VICODIN) 7.5-500 MG per tablet Take 7.5-500 tablets by mouth every 6 (six) hours.     • Insulin Pen Needle (B-D UF III MINI PEN NEEDLES) 31G X 5 MM misc Use 4 times daily 120 each 11   • Insulin Regular Human, Conc, (HUMULIN R U-500 KWIKPEN) 500 UNIT/ML solution pen-injector CONCENTRATED injection 135 units for bkfast and 95 units for supper 5 pen 11   • LORazepam (ATIVAN) 1 MG tablet Take 1 mg by mouth Every 8 (Eight) Hours As Needed for Anxiety.     • potassium chloride (KLOR-CON) 10 MEQ CR tablet Take 10 mEq by mouth 2 (two) times a day.     • SITagliptin-MetFORMIN HCl ER (JANUMET XR) 100-1000 MG tablet Take 1 tablet by mouth Daily. 30 tablet 11   • UNIFINE PENTIPS 31G X 8 MM misc USE 4 TIMES A DAY FOR INSULIN INJECTION 150 each 11   • warfarin (COUMADIN) 3 MG tablet Take 3 mg by mouth Daily.       No current facility-administered medications for  "this visit.      Allergies   Allergen Reactions   • Avelox [Moxifloxacin]    • Moxifloxacin Hcl In Nacl      Rash, itching- could not breathe     Social History     Socioeconomic History   • Marital status: Single     Spouse name: Not on file   • Number of children: Not on file   • Years of education: Not on file   • Highest education level: Not on file   Tobacco Use   • Smoking status: Current Every Day Smoker   • Smokeless tobacco: Never Used   Substance and Sexual Activity   • Alcohol use: No   • Drug use: No   • Sexual activity: Defer       Review of Systems  Review of Systems   Constitutional: Negative for activity change, appetite change, diaphoresis and fatigue.   HENT: Negative for facial swelling, sneezing, sore throat, tinnitus, trouble swallowing and voice change.    Eyes: Negative for photophobia, pain, discharge, redness, itching and visual disturbance.   Respiratory: Negative for apnea, cough, choking, chest tightness and shortness of breath.    Cardiovascular: Negative for chest pain, palpitations and leg swelling.   Gastrointestinal: Negative for abdominal distention, abdominal pain, constipation, diarrhea, nausea and vomiting.   Endocrine: Negative for cold intolerance, heat intolerance, polydipsia, polyphagia and polyuria.   Genitourinary: Negative for difficulty urinating, dysuria, frequency, hematuria and urgency.   Musculoskeletal: Negative for arthralgias, back pain, gait problem, joint swelling, myalgias, neck pain and neck stiffness.   Skin: Negative for color change, pallor, rash and wound.   Neurological: Negative for dizziness, tremors, weakness, light-headedness, numbness and headaches.   Hematological: Negative for adenopathy. Does not bruise/bleed easily.   Psychiatric/Behavioral: Negative for behavioral problems, confusion and sleep disturbance.        Objective    /84   Pulse 82   Ht 157.5 cm (62\")   Wt 107 kg (235 lb 12.8 oz)   SpO2 97%   BMI 43.13 kg/m²   Physical Exam "   Constitutional: She is oriented to person, place, and time. She appears well-developed and well-nourished. No distress.   HENT:   Head: Normocephalic and atraumatic.   Right Ear: External ear normal.   Left Ear: External ear normal.   Nose: Nose normal.   Eyes: Conjunctivae and EOM are normal. Pupils are equal, round, and reactive to light.   Neck: Normal range of motion. Neck supple. No tracheal deviation present. No thyromegaly present.   Cardiovascular: Normal rate, regular rhythm and normal heart sounds.   No murmur heard.  Pulmonary/Chest: Effort normal and breath sounds normal. No respiratory distress. She has no wheezes.   Abdominal: Soft. Bowel sounds are normal. There is no tenderness. There is no rebound and no guarding.   Musculoskeletal: Normal range of motion. She exhibits no tenderness or deformity.    Milagros had a diabetic foot exam performed today.   During the foot exam she had a monofilament test performed.    Neurological Sensory Findings -  Altered sharp/dull right ankle/foot discrimination and altered sharp/dull left ankle/foot discrimination. Right ankle/foot altered proprioception and left ankle/foot altered proprioception.  Vascular Status -  Her right foot exhibits abnormal foot edema. Her left foot exhibits abnormal foot edema.  Skin Integrity  -  She has right foot onychomycosis, callous right foot and right heel is dry and cracked.She has left foot onychomycosis, callous left foot and left heel dry and cracked..  Neurological: She is alert and oriented to person, place, and time. No cranial nerve deficit.   Skin: Skin is warm and dry. No rash noted.   Psychiatric: She has a normal mood and affect. Her behavior is normal. Judgment and thought content normal.       Lab Review  Hemoglobin A1C   Date Value   04/24/2019 8.00 %   10/30/2018 8.2       Assessment/Plan      1. Diabetic polyneuropathy associated with diabetes mellitus due to underlying condition (CMS/[facility])    2. Hypertension  associated with diabetes (CMS/[facility])    3. Hypercholesterolemia    .    Medications prescribed:  Outpatient Encounter Medications as of 4/24/2019   Medication Sig Dispense Refill   • albuterol (PROVENTIL HFA;VENTOLIN HFA) 108 (90 BASE) MCG/ACT inhaler Inhale  into the lungs as needed.       • allopurinol (ZYLOPRIM) 100 MG tablet Take 100 mg by mouth Daily.     • aspirin 81 MG tablet Take 81 mg by mouth daily.     • beclomethasone (QVAR) 80 MCG/ACT inhaler Inhale 1 puff into the lungs 2 times daily     • digoxin (LANOXIN) 250 MCG tablet Take 250 mcg by mouth Daily.     • doxycycline (VIBRAMYCIN) 100 MG capsule Take 100 mg by mouth Daily.     • DULoxetine (CYMBALTA) 60 MG capsule Take 60 mg by mouth daily.     • Empagliflozin (JARDIANCE) 10 MG tablet Take 1 tablet by mouth Daily. Before bkfast 30 tablet 11   • esomeprazole (nexIUM) 40 MG capsule      • furosemide (LASIX) 40 MG tablet Take 40 mg by mouth daily.     • gabapentin (NEURONTIN) 300 MG capsule Take 300 mg by mouth 3 (three) times a day.     • HYDROcodone-acetaminophen (VICODIN) 7.5-500 MG per tablet Take 7.5-500 tablets by mouth every 6 (six) hours.     • Insulin Pen Needle (B-D UF III MINI PEN NEEDLES) 31G X 5 MM misc Use 4 times daily 120 each 11   • Insulin Regular Human, Conc, (HUMULIN R U-500 KWIKPEN) 500 UNIT/ML solution pen-injector CONCENTRATED injection 135 units for bkfast and 95 units for supper 5 pen 11   • LORazepam (ATIVAN) 1 MG tablet Take 1 mg by mouth Every 8 (Eight) Hours As Needed for Anxiety.     • potassium chloride (KLOR-CON) 10 MEQ CR tablet Take 10 mEq by mouth 2 (two) times a day.     • SITagliptin-MetFORMIN HCl ER (JANUMET XR) 100-1000 MG tablet Take 1 tablet by mouth Daily. 30 tablet 11   • UNIFINE PENTIPS 31G X 8 MM misc USE 4 TIMES A DAY FOR INSULIN INJECTION 150 each 11   • warfarin (COUMADIN) 3 MG tablet Take 3 mg by mouth Daily.       No facility-administered encounter medications on file as of 4/24/2019.        Orders placed  during this encounter include:  Orders Placed This Encounter   Procedures   • POC Glycosylated Hemoglobin (Hb A1C)     Glycemic Management         lipodystropic diabetes       Personal marianne from [facility] 11 to April 24 reviewed    avg 154 , in ragne 64% , betlow 70 = 3%    Main hypo is in the middle of the night and at lunch             Janumet 100/1000 mg xr daily      No actos since  she has CHF      I didn't add trulicity  since having nausea     Humulin U 500         am dose to 120 units --- taking 130   -135 -130     Supper dose increase to 95 units --- taking 90 units -95 -80    At lunch and supper , additional 30 units if readings are more than 250        jardiance 10 mg daily       Approve for personal marianne    #1  Patient has diabetes mellitus, insulin-dependent.    #2 She performs blood glucose testing 4 times daily and blood glucose log was brought to office with variability from     #3  She is requiring  Humulin u 500 , 4 x daily   #4 Patient tests blood sugars 4 times daily and makes frequent self-adjustments and patient is injecting insulin 4 times daily. She has been doing this for more than 6 months . She tests frequently due to hypoglycemia and hyperglycemia.     #5 I have personally seen patient within the past 6 months    #6 We plan on seeing her every 2-3 months for continuous adjustment of her diabetes regimen     #7 patient has hypoglycemia with episodes of unawareness.    #8 patient has day-to-day variation in her mealtime which confounds the degree of insulin dosing with multiple daily injections.    #9 patient has completed diabetes education program with us.    #10 she has demonstrated the ability to self monitor her glucose.        #11 Patient is motivated in improving  diabetes control     Lipid Management        On pravachol and trilipix before       No results found for: CHOL, CHLPL, TRIG, HDL, LDL, LDLDIRECT    Blood Pressure Management:              On verapamil and lopressor      on lasix 40 bid    Add jardiance 10 mg daily    Leg edema      Microvascular Complication Monitoring:       Eye Exam Evaluation, verify date     -----------     Last Microalbumin-Proteinuria Assessment     No results found for: MALBCRERATIO     No results found for: UTPCR     -----------        Neuropathy, yes      On cymbalta / neurontin         Immunizations:       Pending to verify      Preventive Care:       I advised the patient of the risks in continuing to use tobacco, and I provided this patient with smoking cessation educational materials.  I also discussed how to quit smoking and the patient has expressed the willingness to quit.       During this visit, I spent 3-10 mintues counseling the patient regarding smoking cessation.            Weight Related:         Wt Readings from Last 3 Encounters:   11/13/17 271 lb (123 kg)      Body mass index is 49.57 kg/(m^2).           Diet interventions: moderate (500 kCal/d) deficit diet.        Bone Health     No results found for: PTH, CALCIUM, CAION, PHOS, [id number]     Thyroid Health     No results found for: TSH                 Other Diabetes Related Aspects         No results found for: [id number]           4. Follow-up: No Follow-up on file.

## 2019-08-01 ENCOUNTER — OFFICE VISIT (OUTPATIENT)
Dept: ENDOCRINOLOGY | Facility: CLINIC | Age: 69
End: 2019-08-01

## 2019-08-01 VITALS
DIASTOLIC BLOOD PRESSURE: 78 MMHG | HEIGHT: 63 IN | WEIGHT: 269 LBS | SYSTOLIC BLOOD PRESSURE: 138 MMHG | BODY MASS INDEX: 47.66 KG/M2 | HEART RATE: 76 BPM

## 2019-08-01 VITALS
HEIGHT: 62 IN | DIASTOLIC BLOOD PRESSURE: 52 MMHG | WEIGHT: 234.3 LBS | HEART RATE: 89 BPM | SYSTOLIC BLOOD PRESSURE: 122 MMHG | BODY MASS INDEX: 43.12 KG/M2

## 2019-08-01 DIAGNOSIS — G45.9 TIA (TRANSIENT ISCHEMIC ATTACK): ICD-10-CM

## 2019-08-01 DIAGNOSIS — IMO0002 DIABETES MELLITUS TYPE 2, UNCONTROLLED, WITH COMPLICATIONS: Primary | ICD-10-CM

## 2019-08-01 DIAGNOSIS — D64.9 ANEMIA, UNSPECIFIED TYPE: ICD-10-CM

## 2019-08-01 DIAGNOSIS — I15.2 HYPERTENSION ASSOCIATED WITH DIABETES (HCC): ICD-10-CM

## 2019-08-01 DIAGNOSIS — E55.9 VITAMIN D DEFICIENCY: ICD-10-CM

## 2019-08-01 DIAGNOSIS — E78.00 HYPERCHOLESTEROLEMIA: ICD-10-CM

## 2019-08-01 DIAGNOSIS — D50.8 IRON DEFICIENCY ANEMIA SECONDARY TO INADEQUATE DIETARY IRON INTAKE: ICD-10-CM

## 2019-08-01 DIAGNOSIS — M85.80 OSTEOPENIA, UNSPECIFIED LOCATION: ICD-10-CM

## 2019-08-01 DIAGNOSIS — E11.59 HYPERTENSION ASSOCIATED WITH DIABETES (HCC): ICD-10-CM

## 2019-08-01 PROCEDURE — 95251 CONT GLUC MNTR ANALYSIS I&R: CPT | Performed by: NURSE PRACTITIONER

## 2019-08-01 PROCEDURE — 99214 OFFICE O/P EST MOD 30 MIN: CPT | Performed by: NURSE PRACTITIONER

## 2019-08-01 RX ORDER — ERGOCALCIFEROL 1.25 MG/1
50000 CAPSULE ORAL WEEKLY
COMMUNITY

## 2019-08-01 NOTE — PROGRESS NOTES
"  Subjective    Milagros Nance is a 68 y.o. female. she is here today for follow-up for her chief complaint of Type 2 diabetes. She states that she has recently taken a Prednisone shot and that is the reason for her recent high blood sugars. Patient has stated that she is trying to quit smoking and is eating less.     Diabetes   She has type 2 diabetes mellitus. Pertinent negatives for hypoglycemia include no confusion, dizziness, headaches, pallor or tremors. Pertinent negatives for diabetes include no chest pain, no fatigue, no polydipsia, no polyphagia, no polyuria and no weakness. There are no hypoglycemic complications. Risk factors for coronary artery disease include diabetes mellitus, hypertension, sedentary lifestyle, tobacco exposure and obesity. Current diabetic treatment includes insulin injections and oral agent (dual therapy). She is compliant with treatment most of the time. Eye exam is current.        Primary Care Provider     KRISTOPHER Tomlin     Duration 10 years     Timing - Diabetes is Constant     Quality -  needs improvement - has improved      Severity -  severe     Complications - peripheral neuropathy     Current symptoms/problems  polydipsia and polyuria      Alleviating Factors: Compliance       Side Effects  none     Current diet  in general, a \"healthy\" diet       Current exercise none     Current monitoring regimen: home blood tests - 4 times daily       Lab Results   Component Value Date    HGBA1C 8.00 04/24/2019         Home blood sugar records:      Checking 4 x daily     bg range 50 to 350      Hypoglycemia none           The following portions of the patient's history were reviewed and updated as appropriate:   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   • Afib (CMS/HCC)    • Anxiety    • Jackson's esophagus    • Blood in stool    • Colon polyps    • Depression    • Diabetes mellitus (CMS/HCC)    • Diarrhea    • " Hypertension    • Neuropathy    • Osteoarthritis    • Swelling abdomen      Past Surgical History:   Procedure Laterality Date   • CARDIAC CATHETERIZATION Bilateral    • CARPAL TUNNEL RELEASE     •  SECTION     • COLONOSCOPY  2016   • COLONOSCOPY N/A 2018    Procedure: COLONOSCOPY WITH ANESTHESIA;  Surgeon: Kristine Faust MD;  Location: [facility] ENDOSCOPY;  Service: Gastroenterology   • DILATATION AND CURETTAGE     • EXTERNAL EAR SURGERY     • HYSTEROSCOPY       Family History   Problem Relation Age of Onset   • Heart disease Father    • Heart disease Sister    • Diabetes Sister    • Colon cancer Neg Hx    • Colon polyps Neg Hx      OB History     No data available        Current Outpatient Medications   Medication Sig Dispense Refill   • vitamin D (ERGOCALCIFEROL) 95583 units capsule capsule Take 50,000 Units by mouth 1 (One) Time Per Week.     • albuterol (PROVENTIL HFA;VENTOLIN HFA) 108 (90 BASE) MCG/ACT inhaler Inhale  into the lungs as needed.       • allopurinol (ZYLOPRIM) 100 MG tablet Take 100 mg by mouth Daily.     • aspirin 81 MG tablet Take 81 mg by mouth daily.     • beclomethasone (QVAR) 80 MCG/ACT inhaler Inhale 1 puff into the lungs 2 times daily     • digoxin (LANOXIN) 250 MCG tablet Take 250 mcg by mouth Daily.     • doxycycline (VIBRAMYCIN) 100 MG capsule Take 100 mg by mouth Daily.     • DULoxetine (CYMBALTA) 60 MG capsule Take 60 mg by mouth daily.     • Empagliflozin (JARDIANCE) 10 MG tablet Take 1 tablet by mouth Daily. Before bkfast 30 tablet 11   • esomeprazole (nexIUM) 40 MG capsule      • furosemide (LASIX) 40 MG tablet Take 40 mg by mouth daily.     • gabapentin (NEURONTIN) 300 MG capsule Take 300 mg by mouth 3 (three) times a day.     • HYDROcodone-acetaminophen (VICODIN) 7.5-500 MG per tablet Take 7.5-500 tablets by mouth every 6 (six) hours.     • Insulin Pen Needle (B-D UF III MINI PEN NEEDLES) 31G X 5 MM misc Use 4 times daily 120 each 11   • Insulin Regular Human,  Conc, (HUMULIN R U-500 KWIKPEN) 500 UNIT/ML solution pen-injector CONCENTRATED injection 135 units for bkfast and 95 units for supper 5 pen 11   • LORazepam (ATIVAN) 1 MG tablet Take 1 mg by mouth Every 8 (Eight) Hours As Needed for Anxiety.     • potassium chloride (KLOR-CON) 10 MEQ CR tablet Take 10 mEq by mouth 2 (two) times a day.     • SITagliptin-MetFORMIN HCl ER (JANUMET XR) 100-1000 MG tablet Take 1 tablet by mouth Daily. 30 tablet 11   • UNIFINE PENTIPS 31G X 8 MM misc USE 4 TIMES A DAY FOR INSULIN INJECTION 150 each 11   • warfarin (COUMADIN) 3 MG tablet Take 3 mg by mouth Daily.       No current facility-administered medications for this visit.      Allergies   Allergen Reactions   • Avelox [Moxifloxacin]    • Moxifloxacin Hcl In Nacl      Rash, itching- could not breathe     Social History     Socioeconomic History   • Marital status: Single     Spouse name: Not on file   • Number of children: Not on file   • Years of education: Not on file   • Highest education level: Not on file   Tobacco Use   • Smoking status: Current Every Day Smoker   • Smokeless tobacco: Never Used   Substance and Sexual Activity   • Alcohol use: No   • Drug use: No   • Sexual activity: Defer       Review of Systems  Review of Systems   Constitutional: Negative for activity change, appetite change, diaphoresis and fatigue.   HENT: Negative for facial swelling, sneezing, sore throat, tinnitus, trouble swallowing and voice change.    Eyes: Negative for photophobia, pain, discharge, redness, itching and visual disturbance.   Respiratory: Negative for apnea, cough, choking, chest tightness and shortness of breath.    Cardiovascular: Negative for chest pain, palpitations and leg swelling.   Gastrointestinal: Negative for abdominal distention, abdominal pain, constipation, diarrhea, nausea and vomiting.   Endocrine: Negative for cold intolerance, heat intolerance, polydipsia, polyphagia and polyuria.   Genitourinary: Negative for  "difficulty urinating, dysuria, frequency, hematuria and urgency.   Musculoskeletal: Negative for arthralgias, back pain, gait problem, joint swelling, myalgias, neck pain and neck stiffness.   Skin: Negative for color change, pallor, rash and wound.   Neurological: Negative for dizziness, tremors, weakness, light-headedness, numbness and headaches.   Hematological: Negative for adenopathy. Does not bruise/bleed easily.   Psychiatric/Behavioral: Negative for behavioral problems, confusion and sleep disturbance.        Objective    /52 (BP Location: Right arm, Patient Position: Sitting, Cuff Size: Adult)   Pulse 89   Ht 157.5 cm (62.01\")   Wt 106 kg (234 lb 4.8 oz)   BMI 42.84 kg/m²   Physical Exam   Constitutional: She is oriented to person, place, and time. She appears well-developed and well-nourished. No distress.   HENT:   Head: Normocephalic and atraumatic.   Right Ear: External ear normal.   Left Ear: External ear normal.   Nose: Nose normal.   Eyes: Conjunctivae and EOM are normal. Pupils are equal, round, and reactive to light.   Neck: Normal range of motion. Neck supple. No tracheal deviation present. No thyromegaly present.   Cardiovascular: Normal rate, regular rhythm and normal heart sounds.   No murmur heard.  Pulmonary/Chest: Effort normal and breath sounds normal. No respiratory distress. She has no wheezes.   Abdominal: Soft. Bowel sounds are normal. There is no tenderness. There is no rebound and no guarding.   Musculoskeletal: Normal range of motion. She exhibits no tenderness or deformity.   Neurological: She is alert and oriented to person, place, and time. No cranial nerve deficit.   Skin: Skin is warm and dry. No rash noted.   Psychiatric: She has a normal mood and affect. Her behavior is normal. Judgment and thought content normal.       Lab Review  Most recent labs     Sodium (MMOL/L)   Date Value   06/15/2018 141     Potassium (MMOL/L)   Date Value   06/15/2018 4.5     Chloride " (MMOL/L)   Date Value   06/15/2018 96 (L)     CO2 (MMOL/L)   Date Value   06/15/2018 30     BUN (MG/DL)   Date Value   06/15/2018 13     Creatinine (MG/DL)   Date Value   06/15/2018 0.8     Hemoglobin A1C   Date Value   04/24/2019 8.00 %   10/30/2018 8.2         Assessment/Plan      1. Diabetes mellitus type 2, uncontrolled, with complications (CMS/[facility])    2. Hypertension associated with diabetes (CMS/[facility])    3. Hypercholesterolemia    4. Vitamin D deficiency    .    Medications prescribed:  Outpatient Encounter Medications as of 8/1/2019   Medication Sig Dispense Refill   • vitamin D (ERGOCALCIFEROL) 23057 units capsule capsule Take 50,000 Units by mouth 1 (One) Time Per Week.     • albuterol (PROVENTIL HFA;VENTOLIN HFA) 108 (90 BASE) MCG/ACT inhaler Inhale  into the lungs as needed.       • allopurinol (ZYLOPRIM) 100 MG tablet Take 100 mg by mouth Daily.     • aspirin 81 MG tablet Take 81 mg by mouth daily.     • beclomethasone (QVAR) 80 MCG/ACT inhaler Inhale 1 puff into the lungs 2 times daily     • digoxin (LANOXIN) 250 MCG tablet Take 250 mcg by mouth Daily.     • doxycycline (VIBRAMYCIN) 100 MG capsule Take 100 mg by mouth Daily.     • DULoxetine (CYMBALTA) 60 MG capsule Take 60 mg by mouth daily.     • Empagliflozin (JARDIANCE) 10 MG tablet Take 1 tablet by mouth Daily. Before bkfast 30 tablet 11   • esomeprazole (nexIUM) 40 MG capsule      • furosemide (LASIX) 40 MG tablet Take 40 mg by mouth daily.     • gabapentin (NEURONTIN) 300 MG capsule Take 300 mg by mouth 3 (three) times a day.     • HYDROcodone-acetaminophen (VICODIN) 7.5-500 MG per tablet Take 7.5-500 tablets by mouth every 6 (six) hours.     • Insulin Pen Needle (B-D UF III MINI PEN NEEDLES) 31G X 5 MM misc Use 4 times daily 120 each 11   • Insulin Regular Human, Conc, (HUMULIN R U-500 KWIKPEN) 500 UNIT/ML solution pen-injector CONCENTRATED injection 135 units for bkfast and 95 units for supper 5 pen 11   • LORazepam (ATIVAN) 1 MG tablet Take 1  mg by mouth Every 8 (Eight) Hours As Needed for Anxiety.     • potassium chloride (KLOR-CON) 10 MEQ CR tablet Take 10 mEq by mouth 2 (two) times a day.     • SITagliptin-MetFORMIN HCl ER (JANUMET XR) 100-1000 MG tablet Take 1 tablet by mouth Daily. 30 tablet 11   • UNIFINE PENTIPS 31G X 8 MM misc USE 4 TIMES A DAY FOR INSULIN INJECTION 150 each 11   • warfarin (COUMADIN) 3 MG tablet Take 3 mg by mouth Daily.       No facility-administered encounter medications on file as of 8/1/2019.        Orders placed during this encounter include:  Orders Placed This Encounter   Procedures   • Lipid Panel     These lab test should be done fasting. This means do not eat or drink for at least 12 hours prior to getting your blood drawn.     Standing Status:   Future     Standing Expiration Date:   8/1/2020   • Comprehensive Metabolic Panel     Standing Status:   Future     Standing Expiration Date:   8/1/2020   • Hemoglobin A1c     Standing Status:   Future     Standing Expiration Date:   8/1/2020   • TSH     Standing Status:   Future     Standing Expiration Date:   8/1/2020   • Vitamin D 25 Hydroxy     Standing Status:   Future     Standing Expiration Date:   8/1/2020   • Vitamin B12     Standing Status:   Future     Standing Expiration Date:   8/1/2020   • Microalbumin / Creatinine Urine Ratio - Urine, Clean Catch   • CBC & Differential     Standing Status:   Future     Standing Expiration Date:   8/1/2020     Order Specific Question:   Manual Differential     Answer:   No     1. Diabetes Type 2   Glycemic Management         lipodystropic diabetes         Janumet 100/1000 mg xr daily      On Humulin U 500       am dose to 120 units --- taking 135     Supper dose 95 units--Patient has been giving supper insulin 1-2 hours after eating,   Instructed and educated patient on taking insulin before supper so that she will not experience high BS after supper.     At lunch and supper , additional 30 units if readings are more than 250        Taking jardiance 10 mg daily   Continue Jardiance 10mg      Has personal [employer] readings and interpretation are from July 19th-Aug 1st with range of 50 to 350   Please complete labs as ordered        Microvascular Complication Monitoring:       Eye Exam Evaluation  Has had an eye exam recently ( 3 months ago)      -----------     Last Microalbumin-Proteinuria Assessment     No results found for: MALBCRERATIO     No results found for: UTPCR     Please complete labs as ordered      Neuropathy, yes      On cymbalta / neurontin   -----------         2.Hypertension:      On verapamil and lopressor     on lasix 40 bid    Add jardiance 10 mg daily    Leg edema     3.Hypercholesterolemia    On pravachol and trilipix before    off of it    Please complete labs as ordered        Weight Related:       08/01/19  0840   Weight: 106 kg (234 lb 4.8 oz)       Body mass index is 42.84 kg/m².    Diet interventions: moderate (500 kCal/d) deficit diet. Patient is trying to eat less.       Vitamin D deficiency   Bone Health     Patient is currently on Vitamin D 1.25Mg 50,000 1X week   Please complete labs as ordered     Thyroid Health     No results found for: TSH   Please complete labs as ordered             Other Diabetes Related Aspects        Immunizations:          Preventive Care:       I advised the patient of the risks in continuing to use tobacco, and I educated patient on benefits of quitting.  I also discussed how to quit smoking and the patient has expressed the willingness to quit.       During this visit, I spent 3-10 mintues counseling the patient regarding smoking cessation.        Approve for  Insulin pump and or Continuous Glucose Sensor      #1  Patient has diabetes mellitus, insulin-dependent.     #2 She performs blood glucose testing at least times daily and blood glucose log was brought to office with variability from .     #3  She is requiring  Basal insulin  and Prandial Insulin for a total of at  least  4 injections per day and has been doing this for at least 6 months      #4 Patient tests blood sugars at least 4 times daily and makes frequent self-adjustments and patient is injecting insulin at least 4 times daily. She has been doing this for more than 6 months . She tests frequently due to hypoglycemia and hyperglycemia.      #5 I have personally seen patient within the past 6 months     #6 We plan on seeing her every 2-3 months for continuous adjustment of her diabetes regimen      #7 patient has hypoglycemia with episodes of unawareness.     #8 patient has day-to-day variation in her mealtime which confounds the degree of insulin dosing with multiple daily injections.     #9 patient has completed diabetes education program with us.     #10 she has demonstrated the ability to self monitor her glucose.         #11 Patient is motivated in improving  diabetes control       4. Follow-up: Return in about 6 months (around 2/1/2020), or sooner if needed .            This document has been electronically signed by KRISTOPHER Travis on August 1, 2019 9:47 AM  \

## 2019-08-01 NOTE — PATIENT INSTRUCTIONS
Hypoglycemia  Hypoglycemia occurs when the level of sugar (glucose) in the blood is too low. Hypoglycemia can happen in people who do or do not have diabetes. It can develop quickly, and it can be a medical emergency. For most people with diabetes, a blood glucose level below 70 mg/dL (3.9 mmol/L) is considered hypoglycemia.  Glucose is a type of sugar that provides the body's main source of energy. Certain hormones (insulin and glucagon) control the level of glucose in the blood. Insulin lowers blood glucose, and glucagon raises blood glucose. Hypoglycemia can result from having too much insulin in the bloodstream, or from not eating enough food that contains glucose. You may also have reactive hypoglycemia, which happens within 4 hours after eating a meal.  What are the causes?  Hypoglycemia occurs most often in people who have diabetes and may be caused by:  · Diabetes medicine.  · Not eating enough, or not eating often enough.  · Increased physical activity.  · Drinking alcohol on an empty stomach.  If you do not have diabetes, hypoglycemia may be caused by:  · A tumor in the pancreas.  · Not eating enough, or not eating for long periods at a time (fasting).  · A severe infection or illness.  · Certain medicines.  What increases the risk?  Hypoglycemia is more likely to develop in:  · People who have diabetes and take medicines to lower blood glucose.  · People who abuse alcohol.  · People who have a severe illness.  What are the signs or symptoms?  Mild symptoms  Mild hypoglycemia may not cause any symptoms. If you do have symptoms, they may include:  · Hunger.  · Anxiety.  · Sweating and feeling clammy.  · Dizziness or feeling light-headed.  · Sleepiness.  · Nausea.  · Increased heart rate.  · Headache.  · Blurry vision.  · Irritability.  · Tingling or numbness around the mouth, lips, or tongue.  · A change in coordination.  · Restless sleep.  Moderate symptoms  Moderate hypoglycemia can cause:  · Mental  confusion and poor judgment.  · Behavior changes.  · Weakness.  · Irregular heartbeat.  Severe symptoms  Severe hypoglycemia is a medical emergency. It can cause:  · Fainting.  · Seizures.  · Loss of consciousness (coma).  · Death.  How is this diagnosed?  Hypoglycemia is diagnosed with a blood test to measure your blood glucose level. This blood test is done while you are having symptoms. Your health care provider may also do a physical exam and review your medical history.  How is this treated?  This condition can often be treated by immediately eating or drinking something that contains sugar, such as:  · Fruit juice, 4 oz (120 mL).  · Regular soda (not diet soda), 4 oz (120 mL).  · Low-fat milk, 4 oz (120 mL).  · Several pieces of hard candy.  · Sugar or honey, 1 Tbsp.  Treating hypoglycemia if you have diabetes  If you are alert and able to swallow safely, follow the 15:15 rule:  · Take 15 grams of a rapid-acting carbohydrate. Rapid-acting options include:  ? 1 tube of glucose gel.  ? 3 glucose pills.  ? 6-8 pieces of hard candy.  ? 4 oz (120 mL) of fruit juice.  ? 4 oz (120 ml) of regular (not diet) soda.  · Check your blood glucose 15 minutes after you take the carbohydrate.  · If the repeat blood glucose level is still at or below 70 mg/dL (3.9 mmol/L), take 15 grams of a carbohydrate again.  · If your blood glucose level does not increase above 70 mg/dL (3.9 mmol/L) after 3 tries, seek emergency medical care.  · After your blood glucose level returns to normal, eat a meal or a snack within 1 hour.    Treating severe hypoglycemia  Severe hypoglycemia is when your blood glucose level is at or below 54 mg/dL (3 mmol/L). Severe hypoglycemia is a medical emergency. Get medical help right away.  If you have severe hypoglycemia and you cannot eat or drink, you may need an injection of glucagon. A family member or close friend should learn how to check your blood glucose and how to give you a glucagon injection.  Ask your health care provider if you need to have an emergency glucagon injection kit available.  Severe hypoglycemia may need to be treated in a hospital. The treatment may include getting glucose through an IV. You may also need treatment for the cause of your hypoglycemia.  Follow these instructions at home:    General instructions  · Take over-the-counter and prescription medicines only as told by your health care provider.  · Monitor your blood glucose as told by your health care provider.  · Limit alcohol intake to no more than 1 drink a day for nonpregnant women and 2 drinks a day for men. One drink equals 12 oz of beer, 5 oz of wine, or 1½ oz of hard liquor.  · Keep all follow-up visits as told by your health care provider. This is important.  If you have diabetes:  · Always have a rapid-acting carbohydrate snack with you to treat low blood glucose.  · Follow your diabetes management plan as directed. Make sure you:  ? Know the symptoms of hypoglycemia. It is important to treat it right away to prevent it from becoming severe.  ? Take your medicines as directed.  ? Follow your exercise plan.  ? Follow your meal plan. Eat on time, and do not skip meals.  ? Check your blood glucose as often as directed. Always check before and after exercise.  ? Follow your sick day plan whenever you cannot eat or drink normally. Make this plan in advance with your health care provider.  · Share your diabetes management plan with people in your workplace, school, and household.  · Check your urine for ketones when you are ill and as told by your health care provider.  · Carry a medical alert card or wear medical alert jewelry.  Contact a health care provider if:  · You have problems keeping your blood glucose in your target range.  · You have frequent episodes of hypoglycemia.  Get help right away if:  · You continue to have hypoglycemia symptoms after eating or drinking something containing glucose.  · Your blood glucose is  at or below 54 mg/dL (3 mmol/L).  · You have a seizure.  · You faint.  These symptoms may represent a serious problem that is an emergency. Do not wait to see if the symptoms will go away. Get medical help right away. Call your local emergency services (911 in the [place]  Summary  · Hypoglycemia occurs when the level of sugar (glucose) in the blood is too low.  · Hypoglycemia can happen in people who do or do not have diabetes. It can develop quickly, and it can be a medical emergency.  · Make sure you know the symptoms of hypoglycemia and how to treat it.  · Always have a rapid-acting carbohydrate snack with you to treat low blood sugar.  This information is not intended to replace advice given to you by your health care provider. Make sure you discuss any questions you have with your health care provider.  Document Released: 12/18/2006 Document Revised: 07/18/2018 Document Reviewed: 01/20/2017  [employer] Patient Education © 2019 [employer]

## 2019-09-23 NOTE — PROGRESS NOTES
negative there was no M-spike on SPEP and there were no signs of hemolysis with a normal LDH and haptoglobin. B12 was marginal at 370, for which Nona Meyers is receiving parenteral B12 replacements per Dr. Hunter. Colonoscopy and endoscopy were most recently performed on 2016. TREATMENT SUMMARY:  1. Weekly Venofer x6 3/23/16   2.  Weekly Venofer ×6 initiated 2016        PAST MEDICAL HISTORY:   Past Medical History:   Diagnosis Date    Anemia     Harris's esophagus     CAD (coronary artery disease)     non occlusive     CHF (congestive heart failure) (HCC)     COPD (chronic obstructive pulmonary disease) (HCC)     [facility] lung [facility] [address]     Enlarged heart     Family history of heart disease in male family member before age 54     father MI 39, sister cardiomyopathy 63 yo    Fibromyalgia     GERD (gastroesophageal reflux disease)     Hyperlipidemia     Hypertension     Irritable bowel syndrome     Morbid obesity ([facility] [address]     Neuropathy     Nicotine dependence     Osteoarthritis     Osteopenia     Paroxysmal atrial fibrillation (HCC)     TIA (transient ischemic attack)     Type II or unspecified type diabetes mellitus without mention of complication, not stated as uncontrolled     Unspecified sleep apnea     Bi-pap    Varicose veins           PAST SURGICAL HISTORY:  Past Surgical History:   Procedure Laterality Date     SECTION      two c-sections    COLONOSCOPY      DILATION AND CURETTAGE OF UTERUS N/A 2016    EXAM UNDER ANESTHESIA; DILATATION AND CURETTAGE HYSTEROSCOPY performed by Kurtis Dsouza MD at [facility] 9      esophagus stretced 3x's    LAPAROSCOPY OOPHORECTOMY N/A 2016    DIAGNOSTIC LAPAROSCOPY  performed by Kurtis Dsouza MD at [address]      nerve conduction, nerve release on right hand    OTHER SURGICAL HISTORY      ear surgery removed bone replace with wiring (right)    VARICOSE VEIN SURGERY Left 13

## 2019-09-24 ENCOUNTER — OFFICE VISIT (OUTPATIENT)
Dept: HEMATOLOGY | Age: 69
End: 2019-09-24
Payer: MEDICARE

## 2019-09-24 VITALS
BODY MASS INDEX: 43.06 KG/M2 | HEART RATE: 107 BPM | SYSTOLIC BLOOD PRESSURE: 142 MMHG | OXYGEN SATURATION: 99 % | DIASTOLIC BLOOD PRESSURE: 70 MMHG | WEIGHT: 234 LBS | HEIGHT: 62 IN

## 2019-09-24 DIAGNOSIS — I48.20 ATRIAL FIBRILLATION, CHRONIC (HCC): ICD-10-CM

## 2019-09-24 DIAGNOSIS — I50.9 CONGESTIVE HEART FAILURE, UNSPECIFIED HF CHRONICITY, UNSPECIFIED HEART FAILURE TYPE (HCC): ICD-10-CM
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
## 2019-09-24 DIAGNOSIS — D64.9 ANEMIA, UNSPECIFIED TYPE: ICD-10-CM

## 2019-09-24 PROCEDURE — 4004F PT TOBACCO SCREEN RCVD TLK: CPT | Performed by: INTERNAL MEDICINE

## 2019-09-24 PROCEDURE — 1090F PRES/ABSN URINE INCON ASSESS: CPT | Performed by: INTERNAL MEDICINE

## 2019-09-24 PROCEDURE — 1123F ACP DISCUSS/DSCN MKR DOCD: CPT | Performed by: INTERNAL MEDICINE

## 2019-09-24 PROCEDURE — G8428 CUR MEDS NOT DOCUMENT: HCPCS | Performed by: INTERNAL MEDICINE

## 2019-09-24 PROCEDURE — 99214 OFFICE O/P EST MOD 30 MIN: CPT | Performed by: INTERNAL MEDICINE

## 2019-09-24 PROCEDURE — G8598 ASA/ANTIPLAT THER USED: HCPCS | Performed by: INTERNAL MEDICINE

## 2019-09-24 PROCEDURE — G8417 CALC BMI ABV UP PARAM F/U: HCPCS | Performed by: INTERNAL MEDICINE

## 2019-09-24 PROCEDURE — 3017F COLORECTAL CA SCREEN DOC REV: CPT | Performed by: INTERNAL MEDICINE

## 2019-09-24 PROCEDURE — 4040F PNEUMOC VAC/ADMIN/RCVD: CPT | Performed by: INTERNAL MEDICINE

## 2019-09-24 PROCEDURE — G8400 PT W/DXA NO RESULTS DOC: HCPCS | Performed by: INTERNAL MEDICINE

## 2019-09-24 RX ORDER — DOXYCYCLINE HYCLATE 100 MG
100 TABLET ORAL 2 TIMES DAILY
Qty: 20 TABLET | Refills: 0 | Status: SHIPPED | OUTPATIENT
Start: 2019-09-24 | End: 2019-10-04

## 2019-09-25 RX ORDER — HYDROCODONE BITARTRATE AND ACETAMINOPHEN 7.5; 325 MG/1; MG/1
7.5-5 TABLET ORAL 3 TIMES DAILY PRN
COMMUNITY

## 2019-09-25 RX ORDER — ERGOCALCIFEROL 1.25 MG/1
50000 CAPSULE ORAL WEEKLY
COMMUNITY
End: 2021-05-11

## 2019-09-25 RX ORDER — DULOXETIN HYDROCHLORIDE 60 MG/1
60 CAPSULE, DELAYED RELEASE ORAL DAILY
COMMUNITY
End: 2020-12-03 | Stop reason: ALTCHOICE

## 2019-09-25 RX ORDER — GABAPENTIN 300 MG/1
300 CAPSULE ORAL 3 TIMES DAILY
COMMUNITY
Start: 2019-04-11

## 2019-09-25 RX ORDER — DIGOXIN 250 MCG
250 TABLET ORAL DAILY
COMMUNITY
End: 2021-05-11

## 2019-09-25 RX ORDER — FUROSEMIDE 40 MG/1
80 TABLET ORAL DAILY
COMMUNITY

## 2019-09-25 RX ORDER — WARFARIN SODIUM 5 MG/1
5 TABLET ORAL SEE ADMIN INSTRUCTIONS
COMMUNITY
Start: 2019-09-19

## 2019-09-25 RX ORDER — LORAZEPAM 1 MG/1
1 TABLET ORAL DAILY PRN
COMMUNITY
Start: 2019-01-17 | End: 2021-05-11

## 2019-09-25 RX ORDER — ESOMEPRAZOLE MAGNESIUM 40 MG/1
40 CAPSULE, DELAYED RELEASE ORAL 2 TIMES DAILY
COMMUNITY
Start: 2017-11-24

## 2019-09-25 RX ORDER — ALLOPURINOL 100 MG/1
100 TABLET ORAL DAILY
COMMUNITY
Start: 2019-09-16

## 2019-09-25 RX ORDER — POTASSIUM CHLORIDE 750 MG/1
10 TABLET, EXTENDED RELEASE ORAL DAILY
COMMUNITY
Start: 2016-09-15

## 2019-11-07 RX ORDER — INSULIN HUMAN 500 [IU]/ML
INJECTION, SOLUTION SUBCUTANEOUS
Qty: 12 ML | Refills: 3 | Status: SHIPPED | OUTPATIENT
Start: 2019-11-07 | End: 2020-03-06

## 2019-11-26 ENCOUNTER — TELEPHONE (OUTPATIENT)
Dept: ENDOCRINOLOGY | Facility: CLINIC | Age: 69
End: 2019-11-26

## 2019-12-10 DIAGNOSIS — E08.42 DIABETIC POLYNEUROPATHY ASSOCIATED WITH DIABETES MELLITUS DUE TO UNDERLYING CONDITION (HCC): Primary | ICD-10-CM

## 2019-12-12 RX ORDER — SITAGLIPTIN AND METFORMIN HYDROCHLORIDE 1000; 100 MG/1; MG/1
TABLET, FILM COATED, EXTENDED RELEASE ORAL
Qty: 30 TABLET | Refills: 0 | Status: SHIPPED | OUTPATIENT
Start: 2019-12-12 | End: 2019-12-16 | Stop reason: SDUPTHER

## 2019-12-17 ENCOUNTER — OFFICE VISIT (OUTPATIENT)
Dept: HEMATOLOGY | Age: 69
End: 2019-12-17
Payer: MEDICARE

## 2019-12-17 VITALS
OXYGEN SATURATION: 95 % | WEIGHT: 229 LBS | HEART RATE: 102 BPM | BODY MASS INDEX: 42.14 KG/M2 | HEIGHT: 62 IN | RESPIRATION RATE: 16 BRPM | SYSTOLIC BLOOD PRESSURE: 158 MMHG | DIASTOLIC BLOOD PRESSURE: 74 MMHG

## 2019-12-17 DIAGNOSIS — Z12.2 ENCOUNTER FOR SCREENING FOR MALIGNANT NEOPLASM OF LUNG: ICD-10-CM

## 2019-12-17 DIAGNOSIS — D64.9 ANEMIA, UNSPECIFIED TYPE: Primary | ICD-10-CM

## 2019-12-17 DIAGNOSIS — R63.4 WEIGHT LOSS: ICD-10-CM

## 2019-12-17 PROCEDURE — 1123F ACP DISCUSS/DSCN MKR DOCD: CPT | Performed by: INTERNAL MEDICINE

## 2019-12-17 PROCEDURE — 99214 OFFICE O/P EST MOD 30 MIN: CPT | Performed by: INTERNAL MEDICINE

## 2019-12-17 PROCEDURE — 3017F COLORECTAL CA SCREEN DOC REV: CPT | Performed by: INTERNAL MEDICINE

## 2019-12-17 PROCEDURE — G8598 ASA/ANTIPLAT THER USED: HCPCS | Performed by: INTERNAL MEDICINE

## 2019-12-17 PROCEDURE — 1090F PRES/ABSN URINE INCON ASSESS: CPT | Performed by: INTERNAL MEDICINE

## 2019-12-17 PROCEDURE — 4004F PT TOBACCO SCREEN RCVD TLK: CPT | Performed by: INTERNAL MEDICINE

## 2019-12-17 PROCEDURE — G8400 PT W/DXA NO RESULTS DOC: HCPCS | Performed by: INTERNAL MEDICINE

## 2019-12-17 PROCEDURE — G8427 DOCREV CUR MEDS BY ELIG CLIN: HCPCS | Performed by: INTERNAL MEDICINE

## 2019-12-17 PROCEDURE — G8484 FLU IMMUNIZE NO ADMIN: HCPCS | Performed by: INTERNAL MEDICINE

## 2019-12-17 PROCEDURE — G8417 CALC BMI ABV UP PARAM F/U: HCPCS | Performed by: INTERNAL MEDICINE

## 2019-12-17 PROCEDURE — 4040F PNEUMOC VAC/ADMIN/RCVD: CPT | Performed by: INTERNAL MEDICINE

## 2019-12-17 RX ORDER — SITAGLIPTIN AND METFORMIN HYDROCHLORIDE 1000; 100 MG/1; MG/1
TABLET, FILM COATED, EXTENDED RELEASE ORAL
Qty: 30 TABLET | Refills: 0 | Status: SHIPPED | OUTPATIENT
Start: 2019-12-17 | End: 2020-01-20 | Stop reason: SDUPTHER

## 2020-01-21 RX ORDER — SITAGLIPTIN AND METFORMIN HYDROCHLORIDE 1000; 100 MG/1; MG/1
TABLET, FILM COATED, EXTENDED RELEASE ORAL
Qty: 30 TABLET | Refills: 0 | Status: SHIPPED | OUTPATIENT
Start: 2020-01-21 | End: 2020-07-13

## 2020-02-03 ENCOUNTER — OFFICE VISIT (OUTPATIENT)
Dept: ENDOCRINOLOGY | Facility: CLINIC | Age: 70
End: 2020-02-03

## 2020-02-03 VITALS
WEIGHT: 224.9 LBS | DIASTOLIC BLOOD PRESSURE: 76 MMHG | BODY MASS INDEX: 41.39 KG/M2 | SYSTOLIC BLOOD PRESSURE: 130 MMHG | OXYGEN SATURATION: 98 % | HEART RATE: 61 BPM | HEIGHT: 62 IN

## 2020-02-03 DIAGNOSIS — E11.59 HYPERTENSION ASSOCIATED WITH DIABETES (HCC): ICD-10-CM

## 2020-02-03 DIAGNOSIS — E55.9 VITAMIN D DEFICIENCY: ICD-10-CM

## 2020-02-03 DIAGNOSIS — E78.00 HYPERCHOLESTEROLEMIA: ICD-10-CM

## 2020-02-03 DIAGNOSIS — I15.2 HYPERTENSION ASSOCIATED WITH DIABETES (HCC): ICD-10-CM

## 2020-02-03 DIAGNOSIS — IMO0002 DIABETES MELLITUS TYPE 2, UNCONTROLLED, WITH COMPLICATIONS: Primary | ICD-10-CM

## 2020-02-03 PROCEDURE — 99214 OFFICE O/P EST MOD 30 MIN: CPT | Performed by: INTERNAL MEDICINE

## 2020-02-03 PROCEDURE — 95249 CONT GLUC MNTR PT PROV EQP: CPT | Performed by: INTERNAL MEDICINE

## 2020-02-03 RX ORDER — PRAVASTATIN SODIUM 20 MG
20 TABLET ORAL EVERY EVENING
Qty: 30 TABLET | Refills: 11 | Status: SHIPPED | OUTPATIENT
Start: 2020-02-03 | End: 2021-01-14

## 2020-02-03 NOTE — PROGRESS NOTES
"  Subjective    Milagros Nance is a 69 y.o. female. she is here today for follow-up for her chief complaint of Type 2 diabetes. She states that she has recently taken a Prednisone shot and that is the reason for her recent high blood sugars. Patient has stated that she is trying to quit smoking and is eating less.     Diabetes   She has type 2 diabetes mellitus. Pertinent negatives for hypoglycemia include no confusion, dizziness, headaches, pallor or tremors. Pertinent negatives for diabetes include no chest pain, no fatigue, no polydipsia, no polyphagia, no polyuria and no weakness. There are no hypoglycemic complications. Risk factors for coronary artery disease include diabetes mellitus, hypertension, sedentary lifestyle, tobacco exposure and obesity. Current diabetic treatment includes insulin injections and oral agent (dual therapy). She is compliant with treatment most of the time. Eye exam is current.        Primary Care Provider     KRISTOPHER Tomlin     Duration 10 years     Timing - Diabetes is Constant     Quality -  needs improvement - has improved      Severity -  severe     Complications - peripheral neuropathy     Current symptoms/problems  polydipsia and polyuria      Alleviating Factors: Compliance       Side Effects  none     Current diet  in general, a \"healthy\" diet       Current exercise none     Current monitoring regimen: home blood tests - 4 times daily       Lab Results   Component Value Date    HGBA1C 8.00 04/24/2019         Home blood sugar records:      Checking 4 x daily     bg range 50 to 350      Hypoglycemia none           The following portions of the patient's history were reviewed and updated as appropriate:   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 ENDOSCOPY;  Service: Gastroenterology   • DILATATION AND CURETTAGE     • EXTERNAL EAR SURGERY     • HYSTEROSCOPY       Family History   Problem Relation Age of Onset   • Heart disease Father    • Heart disease Sister    • Diabetes Sister    • Colon cancer Neg Hx    • Colon polyps Neg Hx      OB History    None       Current Outpatient Medications   Medication Sig Dispense Refill   • albuterol (PROVENTIL HFA;VENTOLIN HFA) 108 (90 BASE) MCG/ACT inhaler Inhale  into the lungs as needed.       • allopurinol (ZYLOPRIM) 100 MG tablet Take 100 mg by mouth Daily.     • aspirin 81 MG tablet Take 81 mg by mouth daily.     • beclomethasone (QVAR) 80 MCG/ACT inhaler Inhale 1 puff into the lungs 2 times daily     • digoxin (LANOXIN) 250 MCG tablet Take 250 mcg by mouth Daily.     • doxycycline (VIBRAMYCIN) 100 MG capsule Take 100 mg by mouth Daily.     • DULoxetine (CYMBALTA) 60 MG capsule Take 60 mg by mouth daily.     • Empagliflozin (JARDIANCE) 10 MG tablet Take 10 mg by mouth Daily. Before bkfast 30 tablet 11   • esomeprazole (nexIUM) 40 MG capsule      • furosemide (LASIX) 40 MG tablet Take 40 mg by mouth daily.     • gabapentin (NEURONTIN) 300 MG capsule Take 300 mg by mouth 3 (three) times a day.     • HUMULIN R U-500 KWIKPEN 500 UNIT/ML solution pen-injector CONCENTRATED injection INJECT 135 UNITS SUB-Q WITH BREAKFAST & 95 UNITS WITH SUPPER. 12 mL 3   • HYDROcodone-acetaminophen (VICODIN) 7.5-500 MG per tablet Take 7.5-500 tablets by mouth every 6 (six) hours.     • Insulin Pen Needle (B-D UF III MINI PEN NEEDLES) 31G X 5 MM misc Use 4 times daily 120 each 11    • JANUMET -1000 MG tablet TAKE ONE TABLET BY MOUTH EVERY DAY 30 tablet 0   • LORazepam (ATIVAN) 1 MG tablet Take 1 mg by mouth Every 8 (Eight) Hours As Needed for Anxiety.     • potassium chloride (KLOR-CON) 10 MEQ CR tablet Take 10 mEq by mouth 2 (two) times a day.     • UNIFINE PENTIPS 31G X 8 MM misc USE 4 TIMES A DAY FOR INSULIN INJECTION 150 each 11   • vitamin D (ERGOCALCIFEROL) 26660 units capsule capsule Take 50,000 Units by mouth 1 (One) Time Per Week.     • warfarin (COUMADIN) 3 MG tablet Take 3 mg by mouth Daily.       No current facility-administered medications for this visit.      Allergies   Allergen Reactions   • Avelox [Moxifloxacin]    • Moxifloxacin Hcl In Nacl      Rash, itching- could not breathe     Social History     Socioeconomic History   • Marital status: Single     Spouse name: Not on file   • Number of children: Not on file   • Years of education: Not on file   • Highest education level: Not on file   Tobacco Use   • Smoking status: Current Every Day Smoker   • Smokeless tobacco: Never Used   Substance and Sexual Activity   • Alcohol use: No   • Drug use: No   • Sexual activity: Defer       Review of Systems  Review of Systems   Constitutional: Negative for activity change, appetite change, diaphoresis and fatigue.   HENT: Negative for facial swelling, sneezing, sore throat, tinnitus, trouble swallowing and voice change.    Eyes: Negative for photophobia, pain, discharge, redness, itching and visual disturbance.   Respiratory: Negative for apnea, cough, choking, chest tightness and shortness of breath.    Cardiovascular: Negative for chest pain, palpitations and leg swelling.   Gastrointestinal: Negative for abdominal distention, abdominal pain, constipation, diarrhea, nausea and vomiting.   Endocrine: Negative for cold intolerance, heat intolerance, polydipsia, polyphagia and polyuria.   Genitourinary: Negative for difficulty urinating, dysuria, frequency, hematuria and urgency.  "  Musculoskeletal: Negative for arthralgias, back pain, gait problem, joint swelling, myalgias, neck pain and neck stiffness.   Skin: Negative for color change, pallor, rash and wound.   Neurological: Negative for dizziness, tremors, weakness, light-headedness, numbness and headaches.   Hematological: Negative for adenopathy. Does not bruise/bleed easily.   Psychiatric/Behavioral: Negative for behavioral problems, confusion and sleep disturbance.        Objective    /76   Pulse 61   Ht 157.5 cm (62\")   Wt 102 kg (224 lb 14.4 oz)   SpO2 98%   BMI 41.13 kg/m²   Physical Exam   Constitutional: She is oriented to person, place, and time. She appears well-developed and well-nourished. No distress.   HENT:   Head: Normocephalic and atraumatic.   Right Ear: External ear normal.   Left Ear: External ear normal.   Nose: Nose normal.   Eyes: Pupils are equal, round, and reactive to light. Conjunctivae and EOM are normal.   Neck: Normal range of motion. Neck supple. No tracheal deviation present. No thyromegaly present.   Cardiovascular: Normal rate, regular rhythm and normal heart sounds.   No murmur heard.  Pulmonary/Chest: Effort normal and breath sounds normal. No respiratory distress. She has no wheezes.   Abdominal: Soft. Bowel sounds are normal. There is no tenderness. There is no rebound and no guarding.   Musculoskeletal: Normal range of motion. She exhibits no tenderness or deformity.   Neurological: She is alert and oriented to person, place, and time. No cranial nerve deficit.   Skin: Skin is warm and dry. No rash noted.   Psychiatric: She has a normal mood and affect. Her behavior is normal. Judgment and thought content normal.       Lab Review  Most recent labs     Sodium (MMOL/L)   Date Value   06/15/2018 141     Potassium (MMOL/L)   Date Value   06/15/2018 4.5     Chloride (MMOL/L)   Date Value   06/15/2018 96 (L)     CO2 (MMOL/L)   Date Value   06/15/2018 30     BUN (MG/DL)   Date Value   06/15/2018 " 13     Creatinine (MG/DL)   Date Value   06/15/2018 0.8     Hemoglobin A1C   Date Value   04/24/2019 8.00 %   10/30/2018 8.2         Assessment/Plan      1. Diabetes mellitus type 2, uncontrolled, with complications (CMS/[facility])    2. Hypertension associated with diabetes (CMS/[facility])    3. Hypercholesterolemia    4. Vitamin D deficiency    .    Medications prescribed:  Outpatient Encounter Medications as of 2/3/2020   Medication Sig Dispense Refill   • albuterol (PROVENTIL HFA;VENTOLIN HFA) 108 (90 BASE) MCG/ACT inhaler Inhale  into the lungs as needed.       • allopurinol (ZYLOPRIM) 100 MG tablet Take 100 mg by mouth Daily.     • aspirin 81 MG tablet Take 81 mg by mouth daily.     • beclomethasone (QVAR) 80 MCG/ACT inhaler Inhale 1 puff into the lungs 2 times daily     • digoxin (LANOXIN) 250 MCG tablet Take 250 mcg by mouth Daily.     • doxycycline (VIBRAMYCIN) 100 MG capsule Take 100 mg by mouth Daily.     • DULoxetine (CYMBALTA) 60 MG capsule Take 60 mg by mouth daily.     • Empagliflozin (JARDIANCE) 10 MG tablet Take 10 mg by mouth Daily. Before bkfast 30 tablet 11   • esomeprazole (nexIUM) 40 MG capsule      • furosemide (LASIX) 40 MG tablet Take 40 mg by mouth daily.     • gabapentin (NEURONTIN) 300 MG capsule Take 300 mg by mouth 3 (three) times a day.     • HUMULIN R U-500 KWIKPEN 500 UNIT/ML solution pen-injector CONCENTRATED injection INJECT 135 UNITS SUB-Q WITH BREAKFAST & 95 UNITS WITH SUPPER. 12 mL 3   • HYDROcodone-acetaminophen (VICODIN) 7.5-500 MG per tablet Take 7.5-500 tablets by mouth every 6 (six) hours.     • Insulin Pen Needle (B-D UF III MINI PEN NEEDLES) 31G X 5 MM misc Use 4 times daily 120 each 11   • JANUMET -1000 MG tablet TAKE ONE TABLET BY MOUTH EVERY DAY 30 tablet 0   • LORazepam (ATIVAN) 1 MG tablet Take 1 mg by mouth Every 8 (Eight) Hours As Needed for Anxiety.     • potassium chloride (KLOR-CON) 10 MEQ CR tablet Take 10 mEq by mouth 2 (two) times a day.     • UNIFINE PENTIPS 31G X  8 MM misc USE 4 TIMES A DAY FOR INSULIN INJECTION 150 each 11   • vitamin D (ERGOCALCIFEROL) 61169 units capsule capsule Take 50,000 Units by mouth 1 (One) Time Per Week.     • warfarin (COUMADIN) 3 MG tablet Take 3 mg by mouth Daily.       No facility-administered encounter medications on file as of 2/3/2020.        Orders placed during this encounter include:  No orders of the defined types were placed in this encounter.    1. Diabetes Type 2   Glycemic Management     Kervin for the past 2 weeks reviewed    In range 80%          lipodystropic diabetes         Janumet 100/1000 mg xr daily      On Humulin U 500       am dose to 120 units --- taking 135 - now 100 --decrease to 80      Supper dose 95 units-- now 90  Now 70    Instructed and educated patient on taking insulin before supper so that she will not experience high BS after supper.     At lunch and supper , additional 30 units if readings are more than 250       Taking jardiance 10 mg daily          Microvascular Complication Monitoring:       Eye Exam Evaluation  Has had an eye exam recently ( 3 months ago)      -----------     Last Microalbumin-Proteinuria Assessment     No results found for: MALBCRERATIO     No results found for: UTPCR     Please complete labs as ordered      Neuropathy, yes      On cymbalta / neurontin   -----------         2.Hypertension:      On verapamil and lopressor     on lasix 40 bid    Add jardiance 10 mg daily    Leg edema     3.Hypercholesterolemia    On pravachol and trilipix before    off of it    Please complete labs as ordered        Weight Related:       02/03/20  1059   Weight: 102 kg (224 lb 14.4 oz)       Body mass index is 41.13 kg/m².    Diet interventions: moderate (500 kCal/d) deficit diet. Patient is trying to eat less.       Vitamin D deficiency   Bone Health     Patient is currently on Vitamin D 1.25Mg 50,000 1X week   Please complete labs as ordered     Thyroid Health     No results found for: TSH   Please  complete labs as ordered             Other Diabetes Related Aspects        Immunizations:          Preventive Care:       I advised the patient of the risks in continuing to use tobacco, and I educated patient on benefits of quitting.  I also discussed how to quit smoking and the patient has expressed the willingness to quit.       During this visit, I spent 3-10 mintues counseling the patient regarding smoking cessation.        Approve for  Insulin pump and or Continuous Glucose Sensor      #1  Patient has diabetes mellitus, insulin-dependent.     #2 She performs blood glucose testing at least times daily and blood glucose log was brought to office with variability from .     #3  She is requiring  Basal insulin  and Prandial Insulin for a total of at least  4 injections per day and has been doing this for at least 6 months      #4 Patient tests blood sugars at least 4 times daily and makes frequent self-adjustments and patient is injecting insulin at least 4 times daily. She has been doing this for more than 6 months . She tests frequently due to hypoglycemia and hyperglycemia.      #5 I have personally seen patient within the past 6 months     #6 We plan on seeing her every 2-3 months for continuous adjustment of her diabetes regimen      #7 patient has hypoglycemia with episodes of unawareness.     #8 patient has day-to-day variation in her mealtime which confounds the degree of insulin dosing with multiple daily injections.     #9 patient has completed diabetes education program with us.     #10 she has demonstrated the ability to self monitor her glucose.         #11 Patient is motivated in improving  diabetes control       4. Follow-up: Return in about 6 months (around 2/1/2020), or sooner if needed .      This document has been electronically signed by Darek Pascal MD on February 3, 2020 11:36 AM  \

## 2020-03-06 RX ORDER — INSULIN HUMAN 500 [IU]/ML
INJECTION, SOLUTION SUBCUTANEOUS
Qty: 12 ML | Refills: 5 | Status: SHIPPED | OUTPATIENT
Start: 2020-03-06 | End: 2020-07-13

## 2020-04-14 ENCOUNTER — TELEPHONE (OUTPATIENT)
Dept: ENDOCRINOLOGY | Facility: CLINIC | Age: 70
End: 2020-04-14

## 2020-04-30 ENCOUNTER — OFFICE VISIT (OUTPATIENT)
Dept: ENDOCRINOLOGY | Facility: CLINIC | Age: 70
End: 2020-04-30

## 2020-04-30 DIAGNOSIS — I15.2 HYPERTENSION ASSOCIATED WITH DIABETES (HCC): ICD-10-CM

## 2020-04-30 DIAGNOSIS — E78.00 HYPERCHOLESTEROLEMIA: ICD-10-CM

## 2020-04-30 DIAGNOSIS — E55.9 VITAMIN D DEFICIENCY: ICD-10-CM

## 2020-04-30 DIAGNOSIS — E08.42 DIABETIC POLYNEUROPATHY ASSOCIATED WITH DIABETES MELLITUS DUE TO UNDERLYING CONDITION (HCC): ICD-10-CM

## 2020-04-30 DIAGNOSIS — E11.59 HYPERTENSION ASSOCIATED WITH DIABETES (HCC): ICD-10-CM

## 2020-04-30 DIAGNOSIS — IMO0002 DIABETES MELLITUS TYPE 2, UNCONTROLLED, WITH COMPLICATIONS: Primary | ICD-10-CM

## 2020-04-30 PROCEDURE — 99442 PR PHYS/QHP TELEPHONE EVALUATION 11-20 MIN: CPT | Performed by: INTERNAL MEDICINE

## 2020-04-30 NOTE — PROGRESS NOTES
"  Subjective    Milagros Nance is a 69 y.o. female. she is here today for follow-up for her chief complaint of Type 2 diabetes. She states that she has recently taken a Prednisone shot and that is the reason for her recent high blood sugars. Patient has stated that she is trying to quit smoking and is eating less.     Diabetes   She has type 2 diabetes mellitus. Pertinent negatives for hypoglycemia include no confusion, dizziness, headaches, pallor or tremors. Pertinent negatives for diabetes include no chest pain, no fatigue, no polydipsia, no polyphagia, no polyuria and no weakness. There are no hypoglycemic complications. Risk factors for coronary artery disease include diabetes mellitus, hypertension, sedentary lifestyle, tobacco exposure and obesity. Current diabetic treatment includes insulin injections and oral agent (dual therapy). She is compliant with treatment most of the time. Eye exam is current.            This was a Telephone Encounter. Benefits and Disadvantages of a Telephone Visit were discussed and accepted by patient. .  Patient agreed to receive service through Telephone Visit as patient is being compliant with social distancing recommendations imparted by [facility] [place].     You have chosen to receive care through a telephone visit. Do you consent to use a telephone visit for your medical care today? Yes      Primary Care Provider     Manisha Gonzalez, APRN     Duration 10 years     Timing - Diabetes is Constant     Quality -  needs improvement - has improved      Severity -  severe     Complications - peripheral neuropathy     Current symptoms/problems  polydipsia and polyuria      Alleviating Factors: Compliance       Side Effects  none     Current diet  in general, a \"healthy\" diet       Current exercise none     Current monitoring regimen: home blood tests - 4 times daily       Lab Results   Component Value Date    HGBA1C 8.00 04/24/2019         Home blood sugar records:      Checking 4 x daily     bg " range 50 to 350      Hypoglycemia occasional but minimal            The following portions of the patient's history were reviewed and updated as appropriate:   Past Medical History:   Diagnosis Date   • Abdominal pain    • Acquired lipodystrophic diabetes 2017   • Afib (CMS/HCC)    • Anxiety    • Jackson's esophagus    • Blood in stool    • Depression    • Diabetes mellitus (CMS/HCC)    • Diarrhea    • History of adenomatous polyp of colon    • History of colon polyps    • Hypertension    • Neuropathy    • Osteoarthritis    • Swelling abdomen      Past Surgical History:   Procedure Laterality Date   • CARDIAC CATHETERIZATION Bilateral    • CARPAL TUNNEL RELEASE     •  SECTION     • COLONOSCOPY N/A 2018    One 5mm adenomatous polyp in the cecum; One 5mm polyp in the ascending colon-path shows fragment of benign colonic mucosa; One 6mm polyp in the transverse colon-path shows fragment of benign colonic mucosa; Diverticulosis in the left colon; Biopsies were taken with a cold forceps from the right colon for evaluation of microscopic colitis; Repeat 3 years   • COLONOSCOPY  2016    One 6mm tubular adenomatous polyp in the cecum; Two 4-11mm tubular adenomatous polyps in the ascending colon; Three 5-9mm tubular adenomatous polyps at the hepatic flexure; Two 4-6mm tubular adenomatous polyps in the descending colon; One 8mm hyperplastic polyp in the rectum; Repeat 2 years   • DILATATION AND CURETTAGE     • ENDOSCOPY  2016    Small HH; No endoscopic esophageal abnormality to explain pt's dysphagia-esophagus dilated; Normal stomach; Normal examined duodenum; No specimens collected   • EXTERNAL EAR SURGERY     • HYSTEROSCOPY       Family History   Problem Relation Age of Onset   • Heart disease Father    • Heart disease Sister    • Diabetes Sister    • Colon cancer Neg Hx    • Colon polyps Neg Hx      OB History    None       Current Outpatient Medications   Medication Sig Dispense Refill   •  albuterol (PROVENTIL HFA;VENTOLIN HFA) 108 (90 BASE) MCG/ACT inhaler Inhale  into the lungs as needed.       • allopurinol (ZYLOPRIM) 100 MG tablet Take 100 mg by mouth Daily.     • aspirin 81 MG tablet Take 81 mg by mouth daily.     • beclomethasone (QVAR) 80 MCG/ACT inhaler Inhale 1 puff into the lungs 2 times daily     • digoxin (LANOXIN) 250 MCG tablet Take 250 mcg by mouth Daily.     • doxycycline (VIBRAMYCIN) 100 MG capsule Take 100 mg by mouth Daily.     • DULoxetine (CYMBALTA) 60 MG capsule Take 60 mg by mouth daily.     • Empagliflozin (JARDIANCE) 10 MG tablet Take 10 mg by mouth Daily. Before bkfast 30 tablet 11   • esomeprazole (nexIUM) 40 MG capsule      • furosemide (LASIX) 40 MG tablet Take 40 mg by mouth daily.     • gabapentin (NEURONTIN) 300 MG capsule Take 300 mg by mouth 3 (three) times a day.     • HUMULIN R U-500 KWIKPEN 500 UNIT/ML solution pen-injector CONCENTRATED injection INJECT 135 UNITS SUB-Q WITH BREAKFAST & 95 UNITS WITH SUPPER. 12 mL 5   • HYDROcodone-acetaminophen (VICODIN) 7.5-500 MG per tablet Take 7.5-500 tablets by mouth every 6 (six) hours.     • Insulin Pen Needle (B-D UF III MINI PEN NEEDLES) 31G X 5 MM misc Use 4 times daily 120 each 11   • JANUMET -1000 MG tablet TAKE ONE TABLET BY MOUTH EVERY DAY 30 tablet 0   • LORazepam (ATIVAN) 1 MG tablet Take 1 mg by mouth Every 8 (Eight) Hours As Needed for Anxiety.     • potassium chloride (KLOR-CON) 10 MEQ CR tablet Take 10 mEq by mouth 2 (two) times a day.     • pravastatin (PRAVACHOL) 20 MG tablet Take 1 tablet by mouth Every Evening. 30 tablet 11   • UNIFINE PENTIPS 31G X 8 MM misc USE 4 TIMES A DAY FOR INSULIN INJECTION 150 each 11   • vitamin D (ERGOCALCIFEROL) 32063 units capsule capsule Take 50,000 Units by mouth 1 (One) Time Per Week.     • warfarin (COUMADIN) 3 MG tablet Take 3 mg by mouth Daily.       No current facility-administered medications for this visit.      Allergies   Allergen Reactions   • Avelox  [Moxifloxacin]    • Moxifloxacin Hcl In Nacl      Rash, itching- could not breathe     Social History     Socioeconomic History   • Marital status: Single     Spouse name: Not on file   • Number of children: Not on file   • Years of education: Not on file   • Highest education level: Not on file   Tobacco Use   • Smoking status: Current Every Day Smoker   • Smokeless tobacco: Never Used   Substance and Sexual Activity   • Alcohol use: No   • Drug use: No   • Sexual activity: Defer       Review of Systems  Review of Systems   Constitutional: Negative for activity change, appetite change, diaphoresis and fatigue.   HENT: Negative for facial swelling, sneezing, sore throat, tinnitus, trouble swallowing and voice change.    Eyes: Negative for photophobia, pain, discharge, redness, itching and visual disturbance.   Respiratory: Negative for apnea, cough, choking, chest tightness and shortness of breath.    Cardiovascular: Negative for chest pain, palpitations and leg swelling.   Gastrointestinal: Negative for abdominal distention, abdominal pain, constipation, diarrhea, nausea and vomiting.   Endocrine: Negative for cold intolerance, heat intolerance, polydipsia, polyphagia and polyuria.   Genitourinary: Negative for difficulty urinating, dysuria, frequency, hematuria and urgency.   Musculoskeletal: Negative for arthralgias, back pain, gait problem, joint swelling, myalgias, neck pain and neck stiffness.   Skin: Negative for color change, pallor, rash and wound.   Neurological: Negative for dizziness, tremors, weakness, light-headedness, numbness and headaches.   Hematological: Negative for adenopathy. Does not bruise/bleed easily.   Psychiatric/Behavioral: Negative for behavioral problems, confusion and sleep disturbance.        Objective    There were no vitals taken for this visit.     Lab Review  Most recent labs     Sodium (MMOL/L)   Date Value   06/15/2018 141     Potassium (MMOL/L)   Date Value   06/15/2018 4.5      Chloride (MMOL/L)   Date Value   06/15/2018 96 (L)     CO2 (MMOL/L)   Date Value   06/15/2018 30     BUN (MG/DL)   Date Value   06/15/2018 13     Creatinine (MG/DL)   Date Value   06/15/2018 0.8     Hemoglobin A1C   Date Value   04/24/2019 8.00 %   10/30/2018 8.2         Assessment/Plan      1. Diabetes mellitus type 2, uncontrolled, with complications (CMS/[facility])    2. Hypertension associated with diabetes (CMS/[facility])    3. Hypercholesterolemia    4. Vitamin D deficiency    5. Diabetic polyneuropathy associated with diabetes mellitus due to underlying condition (CMS/[facility])    .    Medications prescribed:  Outpatient Encounter Medications as of 4/30/2020   Medication Sig Dispense Refill   • albuterol (PROVENTIL HFA;VENTOLIN HFA) 108 (90 BASE) MCG/ACT inhaler Inhale  into the lungs as needed.       • allopurinol (ZYLOPRIM) 100 MG tablet Take 100 mg by mouth Daily.     • aspirin 81 MG tablet Take 81 mg by mouth daily.     • beclomethasone (QVAR) 80 MCG/ACT inhaler Inhale 1 puff into the lungs 2 times daily     • digoxin (LANOXIN) 250 MCG tablet Take 250 mcg by mouth Daily.     • doxycycline (VIBRAMYCIN) 100 MG capsule Take 100 mg by mouth Daily.     • DULoxetine (CYMBALTA) 60 MG capsule Take 60 mg by mouth daily.     • Empagliflozin (JARDIANCE) 10 MG tablet Take 10 mg by mouth Daily. Before bkfast 30 tablet 11   • esomeprazole (nexIUM) 40 MG capsule      • furosemide (LASIX) 40 MG tablet Take 40 mg by mouth daily.     • gabapentin (NEURONTIN) 300 MG capsule Take 300 mg by mouth 3 (three) times a day.     • HUMULIN R U-500 KWIKPEN 500 UNIT/ML solution pen-injector CONCENTRATED injection INJECT 135 UNITS SUB-Q WITH BREAKFAST & 95 UNITS WITH SUPPER. 12 mL 5   • HYDROcodone-acetaminophen (VICODIN) 7.5-500 MG per tablet Take 7.5-500 tablets by mouth every 6 (six) hours.     • Insulin Pen Needle (B-D UF III MINI PEN NEEDLES) 31G X 5 MM misc Use 4 times daily 120 each 11   • JANUMET -1000 MG tablet TAKE ONE TABLET BY  MOUTH EVERY DAY 30 tablet 0   • LORazepam (ATIVAN) 1 MG tablet Take 1 mg by mouth Every 8 (Eight) Hours As Needed for Anxiety.     • potassium chloride (KLOR-CON) 10 MEQ CR tablet Take 10 mEq by mouth 2 (two) times a day.     • pravastatin (PRAVACHOL) 20 MG tablet Take 1 tablet by mouth Every Evening. 30 tablet 11   • UNIFINE PENTIPS 31G X 8 MM misc USE 4 TIMES A DAY FOR INSULIN INJECTION 150 each 11   • vitamin D (ERGOCALCIFEROL) 71644 units capsule capsule Take 50,000 Units by mouth 1 (One) Time Per Week.     • warfarin (COUMADIN) 3 MG tablet Take 3 mg by mouth Daily.       No facility-administered encounter medications on file as of 4/30/2020.        Orders placed during this encounter include:  No orders of the defined types were placed in this encounter.    1. Diabetes Type 2   Glycemic Management     Kervin for the past 2 weeks reviewed    In range 80%          lipodystropic diabetes         Janumet 100/1000 mg xr daily      On Humulin U 500       am dose to 120 units --- taking 135 - now 100 --decrease to 80 -90     Supper dose 95 units-- now 90  Now 70 -80   Instructed and educated patient on taking insulin before supper so that she will not experience high BS after supper.     At lunch and supper , additional 30 units if readings are more than 250       Taking jardiance 10 mg daily '    Using freestyle kervin          Microvascular Complication Monitoring:       Eye Exam Evaluation  Has had an eye exam recently ( 3 months ago)      -----------     Last Microalbumin-Proteinuria Assessment     No results found for: MALBCRERATIO     No results found for: UTPCR     Please complete labs as ordered      Neuropathy, yes      On cymbalta / neurontin   -----------         2.Hypertension:      On verapamil and lopressor     on lasix 40 bid    Add jardiance 10 mg daily    Leg edema     3.Hypercholesterolemia    On pravachol and trilipix before    off of it    Please complete labs as ordered        Weight Related:    There were no vitals filed for this visit.    There is no height or weight on file to calculate BMI.    Diet interventions: moderate (500 kCal/d) deficit diet. Patient is trying to eat less.       Vitamin D deficiency   Bone Health     Patient is currently on Vitamin D 1.25Mg 50,000 1X week   Please complete labs as ordered     Thyroid Health     No results found for: TSH   Please complete labs as ordered             Other Diabetes Related Aspects        Immunizations:          Preventive Care:            4. Follow-up: Return in about 6 months (around 2/1/2020), or sooner if needed .      This document has been electronically signed by Darek Pascal MD on April 30, 2020 09:19  \    I spent 11 minutes reviewing patient electronic chart , reviewing medications , past history , active problems.   I provided advice regarding management of medical conditions, refilled prescriptions , ordered labs and arranged for future appointment.   Patient was advised to contact us if there were any unanswered questions or ongoing concerns.

## 2020-05-18 NOTE — PROGRESS NOTES
warfarin (COUMADIN) 5 MG tablet Take 5 mg by mouth See Admin Instructions 5mg M/W/F, 4mg all other days      potassium chloride (KLOR-CON M) 10 MEQ extended release tablet Take 10 mEq by mouth 2 times daily      allopurinol (ZYLOPRIM) 100 MG tablet Take 100 mg by mouth daily      HYDROcodone-acetaminophen (NORCO) 7.5-325 MG per tablet Take 7.5-500 tablets by mouth 3 times daily as needed.  digoxin (LANOXIN) 250 MCG tablet Take 250 mcg by mouth daily      empagliflozin (JARDIANCE) 10 MG tablet Take 1 tablet by mouth daily      vitamin D (ERGOCALCIFEROL) 62676 units CAPS capsule Take 50,000 Units by mouth once a week      insulin regular human (HUMULIN R U-500 KWIKPEN) 500 UNIT/ML SOPN concentrated injection pen Inject 135 Units into the skin 2 times daily      LORazepam (ATIVAN) 1 MG tablet Take 1 mg by mouth daily as needed.  SITagliptin-metFORMIN HCl -1000 MG TB24 Take 1 tablet by mouth nightly      DULoxetine (CYMBALTA) 60 MG extended release capsule Take 60 mg by mouth daily      esomeprazole (NEXIUM) 40 MG delayed release capsule Take 40 mg by mouth 2 times daily      furosemide (LASIX) 40 MG tablet Take 80 mg by mouth daily      gabapentin (NEURONTIN) 300 MG capsule Take 300 mg by mouth 4 times daily.       carvedilol (COREG) 12.5 MG tablet       gabapentin (NEURONTIN) 300 MG capsule       HUMALOG KWIKPEN 100 UNIT/ML pen       pravastatin (PRAVACHOL) 80 MG tablet       telmisartan (MICARDIS) 40 MG tablet Take 40 mg by mouth daily      esomeprazole Magnesium (NEXIUM) 40 MG PACK Take 40 mg by mouth daily      PROAIR  (90 BASE) MCG/ACT inhaler Inhale 2 puffs into the lungs daily       HYDROcodone-acetaminophen (NORCO) 7.5-325 MG per tablet Take 1 tablet by mouth every 6 hours as needed       DULoxetine (CYMBALTA) 60 MG capsule Take 60 mg by mouth      aspirin 81 MG tablet Take 81 mg by mouth nightly       insulin glargine (LANTUS) 100 UNIT/ML injection pen Inject 220

## 2020-05-19 ENCOUNTER — OFFICE VISIT (OUTPATIENT)
Dept: HEMATOLOGY | Age: 70
End: 2020-05-19
Payer: MEDICARE

## 2020-05-19 VITALS
OXYGEN SATURATION: 97 % | HEART RATE: 55 BPM | HEIGHT: 62 IN | SYSTOLIC BLOOD PRESSURE: 160 MMHG | BODY MASS INDEX: 41.22 KG/M2 | DIASTOLIC BLOOD PRESSURE: 52 MMHG | WEIGHT: 224 LBS

## 2020-05-19 PROCEDURE — 4040F PNEUMOC VAC/ADMIN/RCVD: CPT | Performed by: INTERNAL MEDICINE

## 2020-05-19 PROCEDURE — G8400 PT W/DXA NO RESULTS DOC: HCPCS | Performed by: INTERNAL MEDICINE

## 2020-05-19 PROCEDURE — G8427 DOCREV CUR MEDS BY ELIG CLIN: HCPCS | Performed by: INTERNAL MEDICINE

## 2020-05-19 PROCEDURE — 3017F COLORECTAL CA SCREEN DOC REV: CPT | Performed by: INTERNAL MEDICINE

## 2020-05-19 PROCEDURE — 4004F PT TOBACCO SCREEN RCVD TLK: CPT | Performed by: INTERNAL MEDICINE

## 2020-05-19 PROCEDURE — G8417 CALC BMI ABV UP PARAM F/U: HCPCS | Performed by: INTERNAL MEDICINE

## 2020-05-19 PROCEDURE — 1123F ACP DISCUSS/DSCN MKR DOCD: CPT | Performed by: INTERNAL MEDICINE

## 2020-05-19 PROCEDURE — 99214 OFFICE O/P EST MOD 30 MIN: CPT | Performed by: INTERNAL MEDICINE

## 2020-05-19 PROCEDURE — 1090F PRES/ABSN URINE INCON ASSESS: CPT | Performed by: INTERNAL MEDICINE

## 2020-07-13 RX ORDER — SITAGLIPTIN AND METFORMIN HYDROCHLORIDE 1000; 100 MG/1; MG/1
TABLET, FILM COATED, EXTENDED RELEASE ORAL
Qty: 90 TABLET | Refills: 0 | Status: SHIPPED | OUTPATIENT
Start: 2020-07-13 | End: 2020-07-21 | Stop reason: SDUPTHER

## 2020-07-13 RX ORDER — INSULIN HUMAN 500 [IU]/ML
INJECTION, SOLUTION SUBCUTANEOUS
Qty: 36 ML | Refills: 0 | Status: SHIPPED | OUTPATIENT
Start: 2020-07-13 | End: 2020-07-21 | Stop reason: SDUPTHER

## 2020-08-17 ENCOUNTER — TELEMEDICINE (OUTPATIENT)
Dept: ENDOCRINOLOGY | Facility: CLINIC | Age: 70
End: 2020-08-17

## 2020-08-17 DIAGNOSIS — E08.42 DIABETIC POLYNEUROPATHY ASSOCIATED WITH DIABETES MELLITUS DUE TO UNDERLYING CONDITION (HCC): ICD-10-CM

## 2020-08-17 DIAGNOSIS — E11.59 HYPERTENSION ASSOCIATED WITH DIABETES (HCC): ICD-10-CM

## 2020-08-17 DIAGNOSIS — IMO0002 DIABETES MELLITUS TYPE 2, UNCONTROLLED, WITH COMPLICATIONS: Primary | ICD-10-CM

## 2020-08-17 DIAGNOSIS — E55.9 VITAMIN D DEFICIENCY: ICD-10-CM

## 2020-08-17 DIAGNOSIS — I15.2 HYPERTENSION ASSOCIATED WITH DIABETES (HCC): ICD-10-CM

## 2020-08-17 PROCEDURE — 99213 OFFICE O/P EST LOW 20 MIN: CPT | Performed by: NURSE PRACTITIONER

## 2020-08-17 NOTE — PROGRESS NOTES
"  Subjective    Milagros Nance is a 69 y.o. female. she is here today for follow-up for her chief complaint of Type 2 diabetes    . Diabetes   She has type 2 diabetes mellitus. Pertinent negatives for hypoglycemia include no confusion, dizziness, headaches, pallor or tremors. Pertinent negatives for diabetes include no chest pain, no fatigue, no polydipsia, no polyphagia, no polyuria and no weakness. There are no hypoglycemic complications. Risk factors for coronary artery disease include diabetes mellitus, hypertension, sedentary lifestyle, tobacco exposure and obesity. Current diabetic treatment includes insulin injections and oral agent (dual therapy). She is compliant with treatment most of the time. Eye exam is current.          You have chosen to receive care through a telehealth visit.  Do you consent to use a video/audio connection for your medical care today? Yes    Today's visit lasted 14 minutes       Primary Care Provider     KRISTOPHER Tomlin     Duration 10 years     Timing - Diabetes is Constant     Quality -  needs improvement - has improved      Severity -  severe     Complications - peripheral neuropathy     Current symptoms/problems  polydipsia and polyuria      Alleviating Factors: Compliance       Side Effects  none     Current diet  in general, a \"healthy\" diet       Current exercise none     Current monitoring regimen: home blood tests - 4 times daily       Lab Results   Component Value Date    HGBA1C 8.00 04/24/2019         Home blood sugar records:      Checking 4 x daily     bg range 50 to 350      Hypoglycemia occasional but minimal, mostly in the AM, after injection           The following portions of the patient's history were reviewed and updated as appropriate:   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   • Afib (CMS/[facility])    • Anxiety    • Jackson's esophagus    • Blood in stool    • Depression    • Diabetes mellitus " (CMS/HCC)    • Diarrhea    • History of adenomatous polyp of colon    • History of colon polyps    • Hypertension    • Neuropathy    • Osteoarthritis    • Swelling abdomen      Past Surgical History:   Procedure Laterality Date   • CARDIAC CATHETERIZATION Bilateral    • CARPAL TUNNEL RELEASE     •  SECTION     • COLONOSCOPY N/A 2018    One 5mm adenomatous polyp in the cecum; One 5mm polyp in the ascending colon-path shows fragment of benign colonic mucosa; One 6mm polyp in the transverse colon-path shows fragment of benign colonic mucosa; Diverticulosis in the left colon; Biopsies were taken with a cold forceps from the right colon for evaluation of microscopic colitis; Repeat 3 years   • COLONOSCOPY  2016    One 6mm tubular adenomatous polyp in the cecum; Two 4-11mm tubular adenomatous polyps in the ascending colon; Three 5-9mm tubular adenomatous polyps at the hepatic flexure; Two 4-6mm tubular adenomatous polyps in the descending colon; One 8mm hyperplastic polyp in the rectum; Repeat 2 years   • DILATATION AND CURETTAGE     • ENDOSCOPY  2016    Small HH; No endoscopic esophageal abnormality to explain pt's dysphagia-esophagus dilated; Normal stomach; Normal examined duodenum; No specimens collected   • EXTERNAL EAR SURGERY     • HYSTEROSCOPY       Family History   Problem Relation Age of Onset   • Heart disease Father    • Heart disease Sister    • Diabetes Sister    • Colon cancer Neg Hx    • Colon polyps Neg Hx      OB History    None       Current Outpatient Medications   Medication Sig Dispense Refill   • albuterol (PROVENTIL HFA;VENTOLIN HFA) 108 (90 BASE) MCG/ACT inhaler Inhale  into the lungs as needed.       • allopurinol (ZYLOPRIM) 100 MG tablet Take 100 mg by mouth Daily.     • aspirin 81 MG tablet Take 81 mg by mouth daily.     • beclomethasone (QVAR) 80 MCG/ACT inhaler Inhale 1 puff into the lungs 2 times daily     • digoxin (LANOXIN) 250 MCG tablet Take 250 mcg by mouth  Daily.     • doxycycline (VIBRAMYCIN) 100 MG capsule Take 100 mg by mouth Daily.     • DULoxetine (CYMBALTA) 60 MG capsule Take 60 mg by mouth daily.     • Empagliflozin (JARDIANCE) 10 MG tablet Take 10 mg by mouth Daily. Before bkfast 30 tablet 11   • esomeprazole (nexIUM) 40 MG capsule      • furosemide (LASIX) 40 MG tablet Take 40 mg by mouth daily.     • gabapentin (NEURONTIN) 300 MG capsule Take 300 mg by mouth 3 (three) times a day.     • HYDROcodone-acetaminophen (VICODIN) 7.5-500 MG per tablet Take 7.5-500 tablets by mouth every 6 (six) hours.     • Insulin Pen Needle (B-D UF III MINI PEN NEEDLES) 31G X 5 MM misc Use 4 times daily 120 each 11   • Insulin Pen Needle (Unifine Pentips) 31G X 8 MM misc Inject 1 each under the skin into the appropriate area as directed 4 (Four) Times a Day. 150 each 11   • Insulin Regular Human, Conc, (HumuLIN R U-500 KwikPen) 500 UNIT/ML solution pen-injector CONCENTRATED injection Inject 135 units  Sub q with breakfast and 95 units with supper 36 mL 0   • LORazepam (ATIVAN) 1 MG tablet Take 1 mg by mouth Every 8 (Eight) Hours As Needed for Anxiety.     • potassium chloride (KLOR-CON) 10 MEQ CR tablet Take 10 mEq by mouth 2 (two) times a day.     • pravastatin (PRAVACHOL) 20 MG tablet Take 1 tablet by mouth Every Evening. 30 tablet 11   • SITagliptin-metFORMIN HCl ER (Janumet XR) 100-1000 MG tablet Take 1 tablet by mouth Daily for 90 days. 90 tablet 0   • vitamin D (ERGOCALCIFEROL) 67803 units capsule capsule Take 50,000 Units by mouth 1 (One) Time Per Week.     • warfarin (COUMADIN) 3 MG tablet Take 3 mg by mouth Daily.       No current facility-administered medications for this visit.      Allergies   Allergen Reactions   • Avelox [Moxifloxacin]    • Moxifloxacin Hcl In Nacl      Rash, itching- could not breathe     Social History     Socioeconomic History   • Marital status: Single     Spouse name: Not on file   • Number of children: Not on file   • Years of education: Not on  file   • Highest education level: Not on file   Tobacco Use   • Smoking status: Current Every Day Smoker   • Smokeless tobacco: Never Used   Substance and Sexual Activity   • Alcohol use: No   • Drug use: No   • Sexual activity: Defer       Review of Systems  Review of Systems   Constitutional: Negative for activity change, appetite change, diaphoresis and fatigue.   HENT: Negative for facial swelling, sneezing, sore throat, tinnitus, trouble swallowing and voice change.    Eyes: Negative for photophobia, pain, discharge, redness, itching and visual disturbance.   Respiratory: Negative for apnea, cough, choking, chest tightness and shortness of breath.    Cardiovascular: Negative for chest pain, palpitations and leg swelling.   Gastrointestinal: Negative for abdominal distention, abdominal pain, constipation, diarrhea, nausea and vomiting.   Endocrine: Negative for cold intolerance, heat intolerance, polydipsia, polyphagia and polyuria.   Genitourinary: Negative for difficulty urinating, dysuria, frequency, hematuria and urgency.   Musculoskeletal: Negative for arthralgias, back pain, gait problem, joint swelling, myalgias, neck pain and neck stiffness.   Skin: Negative for color change, pallor, rash and wound.   Neurological: Negative for dizziness, tremors, weakness, light-headedness, numbness and headaches.   Hematological: Negative for adenopathy. Does not bruise/bleed easily.   Psychiatric/Behavioral: Negative for behavioral problems, confusion and sleep disturbance.        Objective    There were no vitals taken for this visit.     Lab Review  Most recent labs     Sodium (MMOL/L)   Date Value   06/15/2018 141     Potassium (MMOL/L)   Date Value   06/15/2018 4.5     Chloride (MMOL/L)   Date Value   06/15/2018 96 (L)     CO2 (MMOL/L)   Date Value   06/15/2018 30     BUN (MG/DL)   Date Value   06/15/2018 13     Creatinine (MG/DL)   Date Value   06/15/2018 0.8     Hemoglobin A1C   Date Value   04/24/2019 8.00 %    10/30/2018 8.2         Assessment/Plan      1. Diabetes mellitus type 2, uncontrolled, with complications (CMS/[facility])    2. Hypertension associated with diabetes (CMS/[facility])    3. Vitamin D deficiency    .    Medications prescribed:  Outpatient Encounter Medications as of 8/17/2020   Medication Sig Dispense Refill   • albuterol (PROVENTIL HFA;VENTOLIN HFA) 108 (90 BASE) MCG/ACT inhaler Inhale  into the lungs as needed.       • allopurinol (ZYLOPRIM) 100 MG tablet Take 100 mg by mouth Daily.     • aspirin 81 MG tablet Take 81 mg by mouth daily.     • beclomethasone (QVAR) 80 MCG/ACT inhaler Inhale 1 puff into the lungs 2 times daily     • digoxin (LANOXIN) 250 MCG tablet Take 250 mcg by mouth Daily.     • doxycycline (VIBRAMYCIN) 100 MG capsule Take 100 mg by mouth Daily.     • DULoxetine (CYMBALTA) 60 MG capsule Take 60 mg by mouth daily.     • Empagliflozin (JARDIANCE) 10 MG tablet Take 10 mg by mouth Daily. Before bkfast 30 tablet 11   • esomeprazole (nexIUM) 40 MG capsule      • furosemide (LASIX) 40 MG tablet Take 40 mg by mouth daily.     • gabapentin (NEURONTIN) 300 MG capsule Take 300 mg by mouth 3 (three) times a day.     • HYDROcodone-acetaminophen (VICODIN) 7.5-500 MG per tablet Take 7.5-500 tablets by mouth every 6 (six) hours.     • Insulin Pen Needle (B-D UF III MINI PEN NEEDLES) 31G X 5 MM misc Use 4 times daily 120 each 11   • Insulin Pen Needle (Unifine Pentips) 31G X 8 MM misc Inject 1 each under the skin into the appropriate area as directed 4 (Four) Times a Day. 150 each 11   • Insulin Regular Human, Conc, (HumuLIN R U-500 KwikPen) 500 UNIT/ML solution pen-injector CONCENTRATED injection Inject 135 units  Sub q with breakfast and 95 units with supper 36 mL 0   • LORazepam (ATIVAN) 1 MG tablet Take 1 mg by mouth Every 8 (Eight) Hours As Needed for Anxiety.     • potassium chloride (KLOR-CON) 10 MEQ CR tablet Take 10 mEq by mouth 2 (two) times a day.     • pravastatin (PRAVACHOL) 20 MG tablet Take 1  tablet by mouth Every Evening. 30 tablet 11   • SITagliptin-metFORMIN HCl ER (Janumet XR) 100-1000 MG tablet Take 1 tablet by mouth Daily for 90 days. 90 tablet 0   • vitamin D (ERGOCALCIFEROL) 18665 units capsule capsule Take 50,000 Units by mouth 1 (One) Time Per Week.     • warfarin (COUMADIN) 3 MG tablet Take 3 mg by mouth Daily.       No facility-administered encounter medications on file as of 8/17/2020.        Orders placed during this encounter include:  Orders Placed This Encounter   Procedures   • TSH     Standing Status:   Future     Standing Expiration Date:   8/18/2021   • Vitamin D 25 Hydroxy     Standing Status:   Future     Standing Expiration Date:   8/17/2021   • Comprehensive Metabolic Panel     Standing Status:   Future     Standing Expiration Date:   8/17/2021   • Hemoglobin A1c     Standing Status:   Future     Standing Expiration Date:   8/17/2021   • Vitamin B12     Standing Status:   Future     Standing Expiration Date:   8/17/2021   • Lipid Panel     These lab test should be done fasting. This means do not eat or drink for at least 12 hours prior to getting your blood drawn.     Standing Status:   Future     Standing Expiration Date:   8/17/2021   • Microalbumin / Creatinine Urine Ratio - Urine, Clean Catch     Standing Status:   Future     Standing Expiration Date:   8/17/2021   • CBC & Differential     Standing Status:   Future     Standing Expiration Date:   8/17/2021     Order Specific Question:   Manual Differential     Answer:   No     1. Diabetes Type 2   Glycemic Management         lipodystropic diabetes         Janumet 100/1000 mg xr daily      On Humulin U 500       am dose to 120 units --- taking 135 - now 100 --decreased to 80 then went back to 90  - We will decrease AM back down to 85 due to lows in the AM & during the day      Supper dose 95 units-- now 80 at night    Instructed and educated patient on taking insulin before supper so that she will not experience high BS after  supper, she has been trying to do this.     At lunch and supper , additional 30 units if readings are more than 250       Taking jardiance 10 mg daily         Call office if you are still having issues with blood sugar     Microvascular Complication Monitoring:       Eye Exam Evaluation  Up to date      -----------       Please complete labs as ordered      Neuropathy, yes      On cymbalta / neurontin   -----------         2.Hypertension:      On verapamil and lopressor     on lasix 40 bid   jardiance 10 mg daily      3.Hypercholesterolemia    On pravachol and trilipix before    off of it    Please complete labs as ordered        Weight Related:   There were no vitals filed for this visit.    There is no height or weight on file to calculate BMI.    Diet interventions: moderate (500 kCal/d) deficit diet. Patient is trying to eat less.       Vitamin D deficiency   Bone Health     Patient is currently on Vitamin D 1.25Mg 50,000 1X week   Please complete labs as ordered     Thyroid Health     No results found for: TSH       Please complete labs as ordered - I will call with results            Other Diabetes Related Aspects        Immunizations:          Preventive Care:       Needing DM foot exam for DM shoes- Does not feel comfortable coming into office        Return in about 4 weeks (around 9/14/2020), or if symptoms worsen or fail to improve, for Recheck.        This document has been electronically signed by KRISTOPHER Travis on August 17, 2020 20:15

## 2020-09-29 NOTE — PROGRESS NOTES
Carmella Bruno   1950  9/30/2020     Chief Complaint   Patient presents with    Anemia        INTERVAL HISTORY/HISTORY OF PRESENT ILLNESS:  Nancy Villagran is a 61-year-old  female who was seen by me on 1/31/2017 for treatment of iron deficiency anemia. She has not been taking any iron supplementation due to severe constipation. She has received IV iron in 2016. She denies any blood in her stools, she denies any hematuria. She denies any vaginal bleeding. Of note, she has a history of colonic polyps and abdomen. She is currently followed by GI at [facility].  She is due for a repeat EGD/colonoscopy. She has been putting off her scopes due to coronavirus epidemics. She feels weak and has dyspnea on exertion. She is followed by her primary care provider. According to the patient, she had a discussion with her primary care provider regarding lung cancer screening but has not scheduled this yet due to coronavirus epidemics. She has tried oral iron replacement with very poor tolerance and complaints of severe abdominal pain and constipation. She is open to consider IV iron therapy. She did receive that before 2016. HEMATOLOGY HISTORY: Iron deficiency anemia  Nancy Villagran was seen in initial hematology consultation on 2/83/0464 referred by Nathanael ELIZABETH for evaluation of iron deficiency anemia. Labs from 12/16/2015 revealed a WBC of 8.2 with normal differential, Hgb of 12.1 with an MCV of 79.2 and PLT of 347,000. Serum iron was low at 26 and thyroid function-TSH was normal at 0.6. CMP was fairly normal and B12 was 578. Due to the microcytic indices she was started on oral iron replacement therapy, though poorly tolerated. Nancy Villagran has a history of Harris's esophagus, followed by Dr. Jakub Leahy. Tho Cantrell. Endoscopy on 1/26/16 was consistent with Harris's esophagus, hiatal hernia and distal esophageal stricture with dilatation.    Due to oral iron intolerance and increase in reflux symptoms with its use, parenteral replacements were suggested by Dr. Scott Barnett. Marcos Mujica was thus referred for potential parenteral iron therapy. Her initial CBC in this office (02/18/16) revealed a WBC of 10.88, Hgb 12.2 with an MCV of 79.2 and PLT of 367,000. Full iron panel on 2/18/16 was again consistent with deficiency, serum iron 18, , iron sat 4% with ferritin of 12. Urinalysis was negative for hematuria, stool for occult blood ×3 negative there was no M-spike on SPEP and there were no signs of hemolysis with a normal LDH and haptoglobin. B12 was marginal at 370, for which Marcos Mujica is receiving parenteral B12 replacements per Dr. Ashish Petersen. Colonoscopy and endoscopy were most recently performed on 9/1/2016.  11/5/2019 Colonoscopy- 1. Large intestine, cecum, polypectomy: Adenomatous polyp. 2.  Large intestine, right colon, biopsy: Fragment of benign colonic mucosa. Comment: Histologic changes of collagenous or lymphocytic (microscopic) colitis are not identified. 3. \"Ascending colon polyp \": Fragment of benign colonic mucosa. Comment: Histologic changes of an adenomatous polyp or hyperplastic polyp are not identified. 4. \"Transverse colon polyp \": Fragment of benign colonic mucosa demonstrating glandular hyperplastic changes and cautery artifact. TREATMENT SUMMARY:  1. Weekly Venofer x6 3/23/16   2.  Weekly Venofer ×6 initiated 7/7/2016        PAST MEDICAL HISTORY:   Past Medical History:   Diagnosis Date    Anemia     Harris's esophagus     CAD (coronary artery disease)     non occlusive     CHF (congestive heart failure) (HCC)     COPD (chronic obstructive pulmonary disease) (HCC)     Emphysema of lung (HCC)     Enlarged heart     Family history of heart disease in male family member before age 54     father MI 39, sister cardiomyopathy 63 yo    Fibromyalgia     GERD (gastroesophageal reflux disease)     Hyperlipidemia     Hypertension     Irritable bowel syndrome     Morbid obesity ([facility] [address]     Neuropathy  Nicotine dependence     Osteoarthritis     Osteopenia     Paroxysmal atrial fibrillation (HCC)     TIA (transient ischemic attack)     Type II or unspecified type diabetes mellitus without mention of complication, not stated as uncontrolled     Unspecified sleep apnea     Bi-pap    Varicose veins           PAST SURGICAL HISTORY:  Past Surgical History:   Procedure Laterality Date     SECTION      two c-sections    COLONOSCOPY      DILATION AND CURETTAGE OF UTERUS N/A 2016    EXAM UNDER ANESTHESIA; DILATATION AND CURETTAGE [facility]STEROSCOPY [facility]      esophagus stretced 3x's    LAPAROSCOPY OOPHORECTOMY N/A 2016    DIAGNOSTIC LAPAROSCOPY  performed by Sonia Miller MD at [address]      nerve conduction, nerve release on right hand    OTHER SURGICAL HISTORY      ear surgery removed bone replace with wiring (right)    VARICOSE VEIN SURGERY Left 13 DJ    LGSV ablation    VARICOSE VEIN SURGERY Right 2013 [facility]    RGSV ablation    WRIST SURGERY      carpel tunnel bilateral wrist        SOCIAL HISTORY:  Social History     Socioeconomic History    Marital status:      Spouse name: Not on file    Number of children: Not on file    Years of education: Not on file    Highest education level: Not on file   Occupational History    Not on file   Social Needs    Financial resource strain: Not on file    Food insecurity     Worry: Not on file     Inability: Not on file   [employer] needs     Medical: Not on file     Non-medical: Not on file   Tobacco Use    Smoking status: Current Every Day Smoker     Packs/day: 0.50     Years: 45.00     Pack years: 22.50     Types: Cigarettes    Smokeless tobacco: Never Used   Substance and Sexual Activity    Alcohol use: No     Alcohol/week: 0.0 standard drinks    Drug use: No    Sexual activity: Not Currently   Lifestyle    Physical activity Days per week: Not on file     Minutes per session: Not on file    Stress: Not on file   Relationships    Social connections     Talks on phone: Not on file     Gets together: Not on file     Attends Denominational service: Not on file     Active member of club or organization: Not on file     Attends meetings of clubs or organizations: Not on file     Relationship status: Not on file    Intimate partner violence     Fear of current or ex partner: Not on file     Emotionally abused: Not on file     Physically abused: Not on file     Forced sexual activity: Not on file   Other Topics Concern    Not on file   Social History Narrative    Not on file       FAMILY HISTORY:  Family History   Problem Relation Age of Onset    High Blood Pressure Mother     Heart Disease Father     High Cholesterol Sister     Cancer Sister     Diabetes Sister         Current Outpatient Medications   Medication Sig Dispense Refill    warfarin (COUMADIN) 5 MG tablet Take 5 mg by mouth See Admin Instructions 5mg M/W/F, 4mg all other days      potassium chloride (KLOR-CON M) 10 MEQ extended release tablet Take 10 mEq by mouth 2 times daily      allopurinol (ZYLOPRIM) 100 MG tablet Take 100 mg by mouth daily      HYDROcodone-acetaminophen (NORCO) 7.5-325 MG per tablet Take 7.5-500 tablets by mouth 3 times daily as needed.  digoxin (LANOXIN) 250 MCG tablet Take 250 mcg by mouth daily      empagliflozin (JARDIANCE) 10 MG tablet Take 1 tablet by mouth daily      vitamin D (ERGOCALCIFEROL) 13636 units CAPS capsule Take 50,000 Units by mouth once a week      insulin regular human (HUMULIN R U-500 KWIKPEN) 500 UNIT/ML SOPN concentrated injection pen Inject 135 Units into the skin 2 times daily      LORazepam (ATIVAN) 1 MG tablet Take 1 mg by mouth daily as needed.       DULoxetine (CYMBALTA) 60 MG extended release capsule Take 60 mg by mouth daily      esomeprazole (NEXIUM) 40 MG delayed release capsule Take 40 mg by mouth 2 times daily      furosemide (LASIX) 40 MG tablet Take 80 mg by mouth daily      gabapentin (NEURONTIN) 300 MG capsule Take 300 mg by mouth 4 times daily.  carvedilol (COREG) 12.5 MG tablet       gabapentin (NEURONTIN) 300 MG capsule       pravastatin (PRAVACHOL) 80 MG tablet       esomeprazole Magnesium (NEXIUM) 40 MG PACK Take 40 mg by mouth daily      HYDROcodone-acetaminophen (NORCO) 7.5-325 MG per tablet Take 1 tablet by mouth every 6 hours as needed       DULoxetine (CYMBALTA) 60 MG capsule Take 60 mg by mouth      aspirin 81 MG tablet Take 81 mg by mouth nightly       insulin glargine (LANTUS) 100 UNIT/ML injection pen Inject 220 Units into the skin nightly      SITagliptin-metFORMIN HCl -1000 MG TB24 Take 1 tablet by mouth nightly      HUMALOG KWIKPEN 100 UNIT/ML pen       telmisartan (MICARDIS) 40 MG tablet Take 40 mg by mouth daily      PROAIR  (90 BASE) MCG/ACT inhaler Inhale 2 puffs into the lungs daily        No current facility-administered medications for this visit. REVIEW OF SYSTEMS:    Constitutional: no fever, no night sweats, fatigue;   HEENT: no blurring of vision, no double vision, no hearing difficulty, no tinnitus,no ulceration, no dysphagia  Lungs: no cough, chronic shortness of breath, no wheeze;   CVS:  palpitation, no chest pain, no shortness of breath;  GI: no abdominal pain, no nausea , no vomiting, no constipation;   MARY GRACE: no dysuria, frequency and urgency, no hematuria, no kidney stones;   Musculoskeletal: no joint pain, swelling , stiffness;   Endocrine: no polyuria, polydypsia, no cold or heat intolerence; Hematology/lymphatic: no easy brusing or bleeding, no hx of clotting disorder; no peripheral adenopathy. Dermatology: no skin rash, no eczema, no pruritis;   Psychiatry: no depression, no anxiety,no panic attacks, no suicide ideation;    Neurology: no syncope, no seizures, no numbness or tingling of hands, no numbness or tingling of feet, no paresis;     PHYSICAL EXAM:    Vitals signs:  [facility] 03/01/2013 (Within Months)    LMP 03/01/2013 (Within Months)     Pain scale:3        CONSTITUTIONAL: Alert, appropriate, no acute distress, ill-appearing  EYES: Non icteric, EOM intact, pupils equal round and reactive to light and accommodation. ENT: Oral mucus membranes moist, no oral pharyngeal lesions. External inspection of ears and nose are normal.  Poor dentition  NECK: Supple, no masses. No palpable thyroid mass    CHEST/LUNGS: CTA bilaterally, normal respiratory effort   CARDIOVASCULAR: RRR, no murmurs. No lower extremity edema   ABDOMEN: soft non-tender, active bowel sounds, no hepatosplenomegaly. No palpable masses. EXTREMITIES: warm, Full ROM of all fours extremities. No focal weakness. SKIN: warm, dry with no rashes or lesions  LYMPH: No cervical, clavicular, axillary, or inguinal lymphadenopathy  NEUROLOGIC: follows commands, non focal.   PSYCH: mood and affect appropriate. Alert and oriented to time and place and person. Relevant Lab findings/reviewed by me:  CBC:  WBC-  HGB-  PLT-  Neut-    Relevant Imaging studies/reviewed by me:  No results found. ASSESSMENT    Orders Placed This Encounter   Procedures    CT lung screen [Initial/Annual]     Age: 81 y.o. Smoking History:   Social History    Tobacco Use      Smoking status: Current Every Day Smoker        Packs/day: 0.50        Years: 45.00        Pack years: 22.5        Types: Cigarettes      Smokeless tobacco: Never Used    Alcohol use: No      Alcohol/week: 0.0 standard drinks    Drug use: No    Pack years: 22.5  Last CT lung screen: [unfilled]     Standing Status:   Future     Standing Expiration Date:   9/30/2021     Order Specific Question:   Is there documentation of shared decision making? Answer:   Yes     Order Specific Question:   Does the patient show any signs or symptoms of lung cancer?      Answer:   No     Order Specific Question:   Is this the first (baseline) CT or an annual exam?     Answer:   Baseline [1]     Order Specific Question:   Is this a low dose CT or a routine CT? Answer:   Low Dose CT [1]     Order Specific Question:   Smoking Status? Answer:   Current Every Day Smoker [1]     Order Specific Question:   Smoking packs per day? Answer:   0.5     Order Specific Question:   Years smoking? Answer:   39    Comprehensive Metabolic Panel     Standing Status:   Future     Standing Expiration Date:   9/30/2021    Iron and TIBC     Standing Status:   Future     Standing Expiration Date:   9/30/2021     Order Specific Question:   Is Patient Fasting? Answer:   no     Order Specific Question:   No of Hours? Answer:   n/a    CBC Auto Differential     Standing Status:   Future     Standing Expiration Date:   9/30/2021    Comprehensive Metabolic Panel     Standing Status:   Future     Standing Expiration Date:   9/30/2021    External Referral To Gastroenterology     Referral Priority:   Routine     Referral Type:   Eval and Treat     Referral Reason:   Specialty Services Required     Referred to Provider:   Avelino Flower MD     Requested Specialty:   Gastroenterology     Number of Visits Requested:   1      Livier Lozano was seen today for anemia. Diagnoses and all orders for this visit:    Current smoker  -     CT lung screen [Initial/Annual]; Future    Iron deficiency anemia, unspecified iron deficiency anemia type  -     External Referral To Gastroenterology  -     Comprehensive Metabolic Panel; Future  -     Iron and TIBC; Future  -     CBC Auto Differential; Future  -     Comprehensive Metabolic Panel; Future         Iron deficiency anemia- she is mildly anemic. She has a low MCV. She has low iron levels. Addendum: 12/17/2019-Ferritin 20, iron saturation 5%, TIBC 416, UIBC 395 (H), iron 21. Labs are consistent with iron deficiency. CBC:5/19/2020  WBC-13.5  HGB-11.6/MCV 69   PLT-472,000  Neut-9.9    On oral iron therapy with very poor tolerance. She has severe abdominal pain. She has complaints of constipation. CBC showed interval worsening of her CBC. Hemoglobin 10.5/MCV 67. Platelet counts 052,790. WBC 12.8. Therefore, recommend IV iron therapy. Recommend Injectafer x2 doses. Referred to Dr. Concetta Werner for consideration of EGD/colonoscopy. Neutrophil leukocytosis- likely related to smoking. At risk for Lung Cancer-she meets criteria for CT low dose lung cancer screening. She has been smoking for more than 30 years. Congestive heart failure-continue management with primary care provider. Stable    A fib. -Continue chronic anticoagulation as per PCP. Warfarin     Smoking cessation- She has not quit yet. Again strongly encouraged to quit. Hypertension-controlled. Follow-up with PCP. Plan:   · Follow-up 2 months with MD  · F/U Dr Concetta Werner for colonoscopy and EGD. · IV Injectafer x2 doses. · Lung Cancer screening with CT low-dose    Follow Up:     Return in about 8 weeks (around 2020), or Dr. Meaghan Nance for virtual visit. Data Erin Young am scribing for Staci Duque MD. Electronically signed by Shadra Young on 2020 at 8:52 AM.     I, Dr Trisha Jacobson, personally performed the services described in this documentation as scribed by Sharda Young MA in my presence and is both accurate and complete. Over 50% of the total visit time of 25 minutes in face to face encounter with the patient, out of which more than 50% of the time was spent in counseling patient or family and coordination of care. Counseling included but was not limited to time spent reviewing labs, imaging studies/ treatment plan and answering questions.      2020    TELEHEALTH EVALUATION -- Audio/Visual (During TWOAO-40 public health emergency)    HPI:    Corinna Keyes (:  1950) has requested an audio/video evaluation for the following concern(s):      PHYSICAL EXAMINATION:  [ INSTRUCTIONS:  \"[x]\" Indicates a positive item  \"[]\" Indicates a negative item  -- DELETE ALL ITEMS NOT EXAMINED]  Vital Signs: (As obtained by patient/caregiver or practitioner observation)      Constitutional: [x] Appears well-developed and well-nourished [] No apparent distress      [] Abnormal-   Mental status  [x] Alert and awake  [] Oriented to person/place/time []Able to follow commands      Eyes:  EOM    []  Normal  [] Abnormal-  Sclera  []  Normal  [] Abnormal -         Discharge []  None visible  [] Abnormal -    HENT:   [] Normocephalic, atraumatic. [] Abnormal   [] Mouth/Throat: Mucous membranes are moist.     External Ears [] Normal  [] Abnormal-     Neck: [] No visualized mass     Pulmonary/Chest: [] Respiratory effort normal.  [] No visualized signs of difficulty breathing or respiratory distress        [] Abnormal-      Musculoskeletal:   [] Normal gait with no signs of ataxia         [] Normal range of motion of neck        [] Abnormal-       Neurological:        [] No Facial Asymmetry (Cranial nerve 7 motor function) (limited exam to video visit)          [] No gaze palsy        [] Abnormal-         Skin:        [] No significant exanthematous lesions or discoloration noted on facial skin         [] Abnormal-            Psychiatric:       [] Normal Affect [] No Hallucinations        [] Abnormal-     Other pertinent observable physical exam findings-     ASSESSMENT/PLAN:    Return in about 8 weeks (around 11/25/2020), or Dr. Berry Lezama for virtual visit. Meghan Johnson is a 81 y.o. female being evaluated by a Virtual Visit (video visit) encounter to address concerns as mentioned above. A caregiver was present when appropriate. Due to this being a TeleHealth encounter (During RTNBE-41 public health emergency), evaluation of the following organ systems was limited: Vitals/Constitutional/EENT/Resp/CV/GI//MS/Neuro/Skin/Heme-Lymph-Imm.   Pursuant to the emergency declaration under the [address] [facility] waiver authority and the [facility] Appropriations Act, this Virtual Visit was conducted with patient's (and/or legal guardian's) consent, to reduce the patient's risk of exposure to COVID-19 and provide necessary medical care. The patient (and/or legal guardian) has also been advised to contact this office for worsening conditions or problems, and seek emergency medical treatment and/or call 911 if deemed necessary. Patient identification was verified at the start of the visit: Yes    Total time spent on this encounter: 20    Services were provided through a video synchronous discussion virtually to substitute for in-person clinic visit. Patient was at home and provider was located at the clinic, [facility]. --Ashely Jamison MD on 9/30/2020 at 3:50 PM    An electronic signature was used to authenticate this note.

## 2020-09-30 ENCOUNTER — TELEMEDICINE (OUTPATIENT)
Dept: HEMATOLOGY | Age: 70
End: 2020-09-30
Payer: MEDICARE

## 2020-09-30 PROCEDURE — G0296 VISIT TO DETERM LDCT ELIG: HCPCS | Performed by: INTERNAL MEDICINE

## 2020-09-30 PROCEDURE — 4040F PNEUMOC VAC/ADMIN/RCVD: CPT | Performed by: INTERNAL MEDICINE

## 2020-09-30 PROCEDURE — G8400 PT W/DXA NO RESULTS DOC: HCPCS | Performed by: INTERNAL MEDICINE

## 2020-09-30 PROCEDURE — 4004F PT TOBACCO SCREEN RCVD TLK: CPT | Performed by: INTERNAL MEDICINE

## 2020-09-30 PROCEDURE — 3017F COLORECTAL CA SCREEN DOC REV: CPT | Performed by: INTERNAL MEDICINE

## 2020-09-30 PROCEDURE — 99213 OFFICE O/P EST LOW 20 MIN: CPT | Performed by: INTERNAL MEDICINE

## 2020-09-30 PROCEDURE — G8428 CUR MEDS NOT DOCUMENT: HCPCS | Performed by: INTERNAL MEDICINE

## 2020-09-30 PROCEDURE — 1123F ACP DISCUSS/DSCN MKR DOCD: CPT | Performed by: INTERNAL MEDICINE

## 2020-09-30 PROCEDURE — 1090F PRES/ABSN URINE INCON ASSESS: CPT | Performed by: INTERNAL MEDICINE

## 2020-09-30 PROCEDURE — G8417 CALC BMI ABV UP PARAM F/U: HCPCS | Performed by: INTERNAL MEDICINE

## 2020-09-30 RX ORDER — HEPARIN SODIUM (PORCINE) LOCK FLUSH IV SOLN 100 UNIT/ML 100 UNIT/ML
500 SOLUTION INTRAVENOUS PRN
Status: CANCELLED | OUTPATIENT
Start: 2020-10-07

## 2020-09-30 RX ORDER — SODIUM CHLORIDE 0.9 % (FLUSH) 0.9 %
5 SYRINGE (ML) INJECTION PRN
Status: CANCELLED | OUTPATIENT
Start: 2020-10-07

## 2020-09-30 RX ORDER — SODIUM CHLORIDE 9 MG/ML
INJECTION, SOLUTION INTRAVENOUS CONTINUOUS
Status: CANCELLED | OUTPATIENT
Start: 2020-10-07

## 2020-09-30 RX ORDER — DIPHENHYDRAMINE HYDROCHLORIDE 50 MG/ML
50 INJECTION INTRAMUSCULAR; INTRAVENOUS ONCE
Status: CANCELLED | OUTPATIENT
Start: 2020-10-07

## 2020-09-30 RX ORDER — EPINEPHRINE 1 MG/ML
0.3 INJECTION, SOLUTION, CONCENTRATE INTRAVENOUS PRN
Status: CANCELLED | OUTPATIENT
Start: 2020-10-07

## 2020-09-30 RX ORDER — SODIUM CHLORIDE 0.9 % (FLUSH) 0.9 %
10 SYRINGE (ML) INJECTION PRN
Status: CANCELLED | OUTPATIENT
Start: 2020-10-07

## 2020-09-30 RX ORDER — METHYLPREDNISOLONE SODIUM SUCCINATE 125 MG/2ML
125 INJECTION, POWDER, LYOPHILIZED, FOR SOLUTION INTRAMUSCULAR; INTRAVENOUS ONCE
Status: CANCELLED | OUTPATIENT
Start: 2020-10-07

## 2020-10-07 ENCOUNTER — DOCUMENTATION (OUTPATIENT)
Dept: ENDOCRINOLOGY | Facility: CLINIC | Age: 70
End: 2020-10-07

## 2020-10-09 ENCOUNTER — HOSPITAL ENCOUNTER (OUTPATIENT)
Dept: CT IMAGING | Age: 70
Discharge: HOME | End: 2020-10-09
Payer: MEDICARE

## 2020-10-09 PROCEDURE — G0297 LDCT FOR LUNG CA SCREEN: HCPCS

## 2020-10-16 RX ORDER — SITAGLIPTIN AND METFORMIN HYDROCHLORIDE 1000; 100 MG/1; MG/1
TABLET, FILM COATED, EXTENDED RELEASE ORAL
Qty: 90 TABLET | Refills: 0 | Status: SHIPPED | OUTPATIENT
Start: 2020-10-16 | End: 2020-10-20 | Stop reason: SDUPTHER

## 2020-10-16 RX ORDER — INSULIN HUMAN 500 [IU]/ML
INJECTION, SOLUTION SUBCUTANEOUS
Qty: 36 ML | Refills: 0 | Status: SHIPPED | OUTPATIENT
Start: 2020-10-16 | End: 2020-10-20 | Stop reason: SDUPTHER

## 2020-10-20 RX ORDER — INSULIN HUMAN 500 [IU]/ML
INJECTION, SOLUTION SUBCUTANEOUS
Qty: 36 ML | Refills: 0 | Status: SHIPPED | OUTPATIENT
Start: 2020-10-20 | End: 2021-03-11 | Stop reason: SDUPTHER

## 2020-10-20 RX ORDER — SITAGLIPTIN AND METFORMIN HYDROCHLORIDE 1000; 100 MG/1; MG/1
1 TABLET, FILM COATED, EXTENDED RELEASE ORAL DAILY
Qty: 90 TABLET | Refills: 3 | Status: SHIPPED | OUTPATIENT
Start: 2020-10-20 | End: 2021-03-11 | Stop reason: SDUPTHER

## 2020-12-02 NOTE — PROGRESS NOTES
Michelle Benjamin [facility]   1950  12/3/2020     Chief Complaint   Patient presents with    Follow-up     Anemia, unspecified type        INTERVAL HISTORY/HISTORY OF PRESENT ILLNESS:  Virtual visit today. Elvis Mack is a 24-year-old  female who was seen by me on 1/31/2017 for treatment of iron deficiency anemia. She has not been taking any iron supplementation due to severe constipation. She has received IV iron in 2016. She denies any blood in her stools, she denies any hematuria. She denies any vaginal bleeding. Of note, she has a history of colonic polyps and abdomen. She is currently followed by GI at [facility].  She is due for a repeat EGD/colonoscopy. She has been putting off her scopes due to coronavirus epidemics. She received IV iron therapy. She felt better initially but then has persistent fatigue now. She had a CT for lung cancer screening performed. HEMATOLOGY HISTORY: Iron deficiency anemia  Elvis Mack was seen in initial hematology consultation on 2/61/0985 referred by Alcides ELIZABETH for evaluation of iron deficiency anemia. Labs from 12/16/2015 revealed a WBC of 8.2 with normal differential, Hgb of 12.1 with an MCV of 79.2 and PLT of 347,000. Serum iron was low at 26 and thyroid function-TSH was normal at 0.6. CMP was fairly normal and B12 was 578. Due to the microcytic indices she was started on oral iron replacement therapy, though poorly tolerated. Elvis Mack has a history of Harris's esophagus, followed by Dr. Antonieta Gonsalves. Jennifer Carlson. Endoscopy on 1/26/16 was consistent with Harris's esophagus, hiatal hernia and distal esophageal stricture with dilatation. Due to oral iron intolerance and increase in reflux symptoms with its use, parenteral replacements were suggested by Dr. Jennifer Carlson. Elvis Mack was thus referred for potential parenteral iron therapy. Her initial CBC in this office (02/18/16) revealed a WBC of 10.88, Hgb 12.2 with an MCV of 79.2 and PLT of 367,000.    Full iron panel on 2/18/16 was again consistent with deficiency, serum iron 18, , iron sat 4% with ferritin of 12. Urinalysis was negative for hematuria, stool for occult blood ×3 negative there was no M-spike on SPEP and there were no signs of hemolysis with a normal LDH and haptoglobin. B12 was marginal at 370, for which Ant Ames is receiving parenteral B12 replacements per Dr. Yariel Arreguin. Colonoscopy and endoscopy were most recently performed on 9/1/2016.  11/5/2019 Colonoscopy- 1. Large intestine, cecum, polypectomy: Adenomatous polyp. 2.  Large intestine, right colon, biopsy: Fragment of benign colonic mucosa. Comment: Histologic changes of collagenous or lymphocytic (microscopic) colitis are not identified. 3. \"Ascending colon polyp \": Fragment of benign colonic mucosa. Comment: Histologic changes of an adenomatous polyp or hyperplastic polyp are not identified. 4. \"Transverse colon polyp \": Fragment of benign colonic mucosa demonstrating glandular hyperplastic changes and cautery artifact. TREATMENT SUMMARY:  1. Weekly Venofer x6 3/23/16   2.  Weekly Venofer ×6 initiated 7/7/2016        PAST MEDICAL HISTORY:   Past Medical History:   Diagnosis Date    Anemia     Harris's esophagus     CAD (coronary artery disease)     non occlusive     CHF (congestive heart failure) (HCC)     COPD (chronic obstructive pulmonary disease) (HCC)     Emphysema of lung ([address]     Enlarged heart     Family history of heart disease in male family member before age 54     father MI 39, sister cardiomyopathy 65 yo    Fibromyalgia     GERD (gastroesophageal reflux disease)     Hyperlipidemia     Hypertension     Irritable bowel syndrome     Morbid obesity ([address]     Neuropathy     Nicotine dependence     Osteoarthritis     Osteopenia     Paroxysmal atrial fibrillation (HCC)     TIA (transient ischemic attack)     Type II or unspecified type diabetes mellitus without mention of complication, not stated as uncontrolled     Unspecified sleep apnea     Bi-pap    Varicose veins           PAST SURGICAL HISTORY:  Past Surgical History:   Procedure Laterality Date     SECTION      two c-sections    COLONOSCOPY      DILATION AND CURETTAGE OF UTERUS N/A 2016    EXAM UNDER ANESTHESIA; DILATATION AND CURETTAGE HYSTEROSCOPY performed by Concetta Villarreal MD at [facility] 9      esophagus stretced 3x's    LAPAROSCOPY OOPHORECTOMY N/A 2016    DIAGNOSTIC LAPAROSCOPY  performed by Concetta Villarreal MD at [address]      nerve conduction, nerve release on right hand    OTHER SURGICAL HISTORY      ear surgery removed bone replace with wiring (right)    VARICOSE VEIN SURGERY Left 13 DJ    LGSV ablation    VARICOSE VEIN SURGERY Right 2013 [facility]    RGSV ablation    WRIST SURGERY      carpel tunnel bilateral wrist        SOCIAL HISTORY:  Social History     Socioeconomic History    Marital status:      Spouse name: None    Number of children: None    Years of education: None    Highest education level: None   Occupational History    None   Social Needs    Financial resource strain: None    Food insecurity     Worry: None     Inability: None    Transportation needs     Medical: None     Non-medical: None   Tobacco Use    Smoking status: Current Every Day Smoker     Packs/day: 0.50     Years: 45.00     Pack years: 22.50     Types: Cigarettes    Smokeless tobacco: Never Used   Substance and Sexual Activity    Alcohol use: No     Alcohol/week: 0.0 standard drinks    Drug use: No    Sexual activity: Not Currently   Lifestyle    Physical activity     Days per week: None     Minutes per session: None    Stress: None   Relationships    Social connections     Talks on phone: None     Gets together: None     Attends [employer] service: None     Active member of club or organization: None     Attends meetings of clubs or organizations: None     Relationship status: None  Intimate partner violence     Fear of current or ex partner: None     Emotionally abused: None     Physically abused: None     Forced sexual activity: None   Other Topics Concern    None   Social History Narrative    None       FAMILY HISTORY:  Family History   Problem Relation Age of Onset    High Blood Pressure Mother     Heart Disease Father     High Cholesterol Sister     Cancer Sister     Diabetes Sister         Current Outpatient Medications   Medication Sig Dispense Refill    warfarin (COUMADIN) 5 MG tablet Take 5 mg by mouth See Admin Instructions 5mg M/W/F, 4mg all other days      potassium chloride (KLOR-CON M) 10 MEQ extended release tablet Take 10 mEq by mouth daily       allopurinol (ZYLOPRIM) 100 MG tablet Take 100 mg by mouth daily      HYDROcodone-acetaminophen (NORCO) 7.5-325 MG per tablet Take 7.5-500 tablets by mouth 3 times daily as needed.  digoxin (LANOXIN) 250 MCG tablet Take 250 mcg by mouth daily      empagliflozin (JARDIANCE) 10 MG tablet Take 1 tablet by mouth daily      vitamin D (ERGOCALCIFEROL) 85819 units CAPS capsule Take 50,000 Units by mouth once a week      insulin regular human (HUMULIN R U-500 KWIKPEN) 500 UNIT/ML SOPN concentrated injection pen Inject 135 Units into the skin 2 times daily      LORazepam (ATIVAN) 1 MG tablet Take 1 mg by mouth daily as needed.  SITagliptin-metFORMIN HCl -1000 MG TB24 Take 1 tablet by mouth nightly      esomeprazole (NEXIUM) 40 MG delayed release capsule Take 40 mg by mouth 2 times daily      furosemide (LASIX) 40 MG tablet Take 80 mg by mouth daily      gabapentin (NEURONTIN) 300 MG capsule Take 300 mg by mouth 3 times daily.  Take 2 tabs TID      carvedilol (COREG) 12.5 MG tablet       pravastatin (PRAVACHOL) 80 MG tablet       PROAIR  (90 BASE) MCG/ACT inhaler Inhale 2 puffs into the lungs daily       aspirin 81 MG tablet Take 81 mg by mouth nightly        No current facility-administered medications for this visit. REVIEW OF SYSTEMS:    Constitutional: no fever, no night sweats, fatigue;   HEENT: no blurring of vision, no double vision, no hearing difficulty, no tinnitus,no ulceration, no dysphagia  Lungs: no cough, chronic shortness of breath, no wheeze;   CVS:  palpitation, no chest pain, no shortness of breath;  GI: no abdominal pain, no nausea , no vomiting, no constipation;   MARY GRACE: no dysuria, frequency and urgency, no hematuria, no kidney stones;   Musculoskeletal: no joint pain, swelling , stiffness;   Endocrine: no polyuria, polydypsia, no cold or heat intolerence; Hematology/lymphatic: no easy brusing or bleeding, no hx of clotting disorder; no peripheral adenopathy. Dermatology: no skin rash, no eczema, no pruritis;   Psychiatry: no depression, no anxiety,no panic attacks, no suicide ideation; Neurology: no syncope, no seizures, no numbness or tingling of hands, no numbness or tingling of feet, no paresis;     PHYSICAL EXAM:    Vitals signs:  [facility] 03/01/2013 (Within Months)    [facility] 03/01/2013 (Within Months)     Pain scale:3        CONSTITUTIONAL: Alert, appropriate, no acute distress, ill-appearing  EYES: Non icteric, EOM intact, pupils equal round and reactive to light and accommodation. ENT: Oral mucus membranes moist, no oral pharyngeal lesions. External inspection of ears and nose are normal.  Poor dentition  NECK: Supple, no masses. No palpable thyroid mass    CHEST/LUNGS: CTA bilaterally, normal respiratory effort   CARDIOVASCULAR: RRR, no murmurs. No lower extremity edema   ABDOMEN: soft non-tender, active bowel sounds, no hepatosplenomegaly. No palpable masses. EXTREMITIES: warm, Full ROM of all fours extremities. No focal weakness. SKIN: warm, dry with no rashes or lesions  LYMPH: No cervical, clavicular, axillary, or inguinal lymphadenopathy  NEUROLOGIC: follows commands, non focal.   PSYCH: mood and affect appropriate.  Alert and oriented to time and place and person. Relevant Lab findings/reviewed by me:  9/10/20   WBC 12.8  HGB 10.5  HCT 37.5    ANC 9.8    TSH 1.97  Iron 17.0      Relevant Imaging studies/reviewed by me:  10/9/20 CT lung cancer screen:  Chronic emphysematous minutes lung changes. No discrete noncalcified nodule or a pulmonary mass noted. No features of lung malignancy. A follow-up low-dose screening CT scan of the chest in one year may be obtained. Nonspecific moderately prominent mediastinal lymph nodes. The etiology and clinical significance is not certain. Large mediastinal and right hilar granulomatous may represent previous  granulomatous disease. ASSESSMENT    No orders of the defined types were placed in this encounter. Rajesh Ba was seen today for follow-up. Diagnoses and all orders for this visit:    Iron deficiency anemia, unspecified iron deficiency anemia type    Care plan discussed with patient    Healthcare maintenance         Iron deficiency anemia- she is mildly anemic. She has a low MCV. She has low iron levels. Addendum: 12/17/2019-Ferritin 20, iron saturation 5%, TIBC 416, UIBC 395 (H), iron 21. Labs are consistent with iron deficiency. CBC:5/19/2020  WBC-13.5  HGB-11.6/MCV 69   PLT-472,000  Neut-9.9    On oral iron therapy with very poor tolerance. She has severe abdominal pain. She has complaints of constipation. CBC showed interval worsening of her CBC. Hemoglobin 10.5/MCV 67. Platelet counts 104,759. WBC 12.8. Therefore, recommend IV iron therapy. Recommend Injectafer x2 doses. Referred to Dr. Duke Barriga for consideration of EGD/colonoscopy. We will repeat CBC next month. Neutrophil leukocytosis- likely related to smoking. At risk for Lung Cancer-CT low-dose lung cancer screening was unremarkable 10/9/2020. Congestive heart failure-continue management with primary care provider. Stable    A fib. -Continue chronic anticoagulation as per PCP.  Warfarin     Smoking cessation- She has not quit yet. Again strongly encouraged to quit. Hypertension-follow-up with PCP    Plan:   · Follow-up 4 weeks in [facility] to repeat iron profile and CBC  · F/U Dr Buck Nair for colonoscopy and EGD. · Repeat CBC in a month  · Lung Cancer screening with CT low-dose October 2021    Follow Up:     Return in about 4 weeks (around 12/31/2020) for Appointment with Dr. Ketan Koch, [address] in [facility] office. Data Aleksander Perez am scribing for Deshaun Pathak MD. Electronically signed by Jay Gardner RN on 12/3/2020 at 5:18 PM CST. I, Dr Orpha Kanner, personally performed the services described in this documentation as scribed by Jay Gardner RN in my presence and is both accurate and complete. Over 50% of the total visit time of 10 minutes in face to face encounter with the patient, out of which more than 50% of the time was spent in counseling patient or family and coordination of care. Counseling included but was not limited to time spent reviewing labs, imaging studies/ treatment plan and answering questions. Vickey Solorzano is a 78 y.o. female evaluated via telephone on 12/3/2020. Consent:  She and/or health care decision maker is aware that that she may receive a bill for this telephone service, depending on her insurance coverage, and has provided verbal consent to proceed: Yes      Documentation:  I communicated with the patient and/or health care decision maker about as above. Details of this discussion including any medical advice provided: as above      I affirm this is a Patient Initiated Episode with a Patient who has not had a related appointment within my department in the past 7 days or scheduled within the next 24 hours.     Patient identification was verified at the start of the visit: Yes    Total Time: minutes: 5-10 minutes    Note: not billable if this call serves to triage the patient into an appointment for the relevant concern      Deshaun Pathak 12/3/2020

## 2020-12-03 ENCOUNTER — TELEMEDICINE (OUTPATIENT)
Dept: HEMATOLOGY | Age: 70
End: 2020-12-03
Payer: MEDICARE

## 2020-12-03 PROCEDURE — 99441 PR PHYS/QHP TELEPHONE EVALUATION 5-10 MIN: CPT | Performed by: INTERNAL MEDICINE

## 2020-12-30 NOTE — PROGRESS NOTES
Jose Mcdaniel   1950  1/5/2021     Chief Complaint   Patient presents with    Follow-up     Anemia, unspecified type        INTERVAL HISTORY/HISTORY OF PRESENT ILLNESS:  Radha El is a 27-year-old  female who was seen by me on 1/31/2017 for treatment of iron deficiency anemia. She had poor tolerance to oral iron supplementation received IV iron in the past.  CBC today showed improvement of her hemoglobin but she still has microcytic indexes. She continues to complains of fatigue and short of breath. Unfortunately, she still smoking. She has not yet had a repeat EGD/colonoscopy that was supposed to happen in the past.  The patient is on warfarin for A. fib. She denies any blood in her stools.         HEMATOLOGY HISTORY: Iron deficiency anemia  Nicole Valencia was seen in initial hematology consultation on 8/88/6462 referred by Charley ELIZABETH for evaluation of iron deficiency anemia. Labs from 12/16/2015 revealed a WBC of 8.2 with normal differential, Hgb of 12.1 with an MCV of 79.2 and PLT of 347,000. Serum iron was low at 26 and thyroid functionTSH was normal at 0.6. CMP was fairly normal and B12 was 578. Due to the microcytic indices she was started on oral iron replacement therapy, though poorly tolerated. Nicole Valencia has a history of Harris's esophagus, followed by Dr. Namita Gomez. Elizabeth Toth. Endoscopy on 1/26/16 was consistent with Harris's esophagus, hiatal hernia and distal esophageal stricture with dilatation. Due to oral iron intolerance and increase in reflux symptoms with its use, parenteral replacements were suggested by Dr. Elizabeth Toth. Nicole Valencia was thus referred for potential parenteral iron therapy. Her initial CBC in this office (02/18/16) revealed a WBC of 10.88, Hgb 12.2 with an MCV of 79.2 and PLT of 367,000. Full iron panel on 2/18/16 was again consistent with deficiency, serum iron 18, , iron sat 4% with ferritin of 12.   Urinalysis was negative for hematuria, stool for occult blood ×3 negative there was no M-spike on SPEP and there were no signs of hemolysis with a normal LDH and haptoglobin. B12 was marginal at 370, for which Kate Lee is receiving parenteral B12 replacements per Dr. Gemma Oates. Colonoscopy and endoscopy were most recently performed on 2016.  2019 Colonoscopy at [address] [place] [address] intestine, cecum, polypectomy: Adenomatous polyp. Large intestine, right colon, biopsy: Fragment of benign colonic mucosa. Comment: Histologic changes of collagenous or lymphocytic (microscopic) colitis are not identified. \"Ascending colon polyp \": Fragment of benign colonic mucosa. Histologic changes of an adenomatous polyp or hyperplastic polyp are not identified. \"Transverse colon polyp \": Fragment of benign colonic mucosa demonstrating glandular hyperplastic changes and cautery artifact.     TREATMENT SUMMARY:  1. Weekly Venofer x6 3/23/16   2.  Weekly Venofer ×6 initiated 2016    PAST MEDICAL HISTORY:   Past Medical History:   Diagnosis Date    Anemia     Harris's esophagus     CAD (coronary artery disease)     non occlusive     CHF (congestive heart failure) (HCC)     COPD (chronic obstructive pulmonary disease) (HCC)     Emphysema of lung (HCC)     Enlarged heart     Family history of heart disease in male family member before age 54     father MI 39, sister cardiomyopathy 63 yo    Fibromyalgia     GERD (gastroesophageal reflux disease)     Hyperlipidemia     Hypertension     Irritable bowel syndrome     Morbid obesity ([address]     Neuropathy     Nicotine dependence     Osteoarthritis     Osteopenia     Paroxysmal atrial fibrillation (HCC)     TIA (transient ischemic attack)     Type II or unspecified type diabetes mellitus without mention of complication, not stated as uncontrolled     Unspecified sleep apnea     Bi-pap    Varicose veins           PAST SURGICAL HISTORY:  Past Surgical History:   Procedure Laterality Date     SECTION      two c-sections    COLONOSCOPY      DILATION AND CURETTAGE OF UTERUS N/A 5/18/2016    EXAM UNDER ANESTHESIA; DILATATION AND CURETTAGE HYSTEROSCOPY performed by Zeenat Canada MD at [facility] 9      esophagus stretced 3x's    LAPAROSCOPY OOPHORECTOMY N/A 5/18/2016    DIAGNOSTIC LAPAROSCOPY  performed by Zeenat Canada MD at [address]      nerve conduction, nerve release on right hand    OTHER SURGICAL HISTORY      ear surgery removed bone replace with wiring (right)    VARICOSE VEIN SURGERY Left 1-18-13 DJ    LGSV ablation    VARICOSE VEIN SURGERY Right 02- [facility]    RGSV ablation    WRIST SURGERY      carpel tunnel bilateral wrist        SOCIAL HISTORY:  Social History     Socioeconomic History    Marital status:      Spouse name: None    Number of children: None    Years of education: None    Highest education level: None   Occupational History    None   Social Needs    Financial resource strain: None    Food insecurity     Worry: None     Inability: None    Transportation needs     Medical: None     Non-medical: None   Tobacco Use    Smoking status: Current Every Day Smoker     Packs/day: 0.50     Years: 45.00     Pack years: 22.50     Types: Cigarettes    Smokeless tobacco: Never Used   Substance and Sexual Activity    Alcohol use: No     Alcohol/week: 0.0 standard drinks    Drug use: No    Sexual activity: Not Currently   Lifestyle    Physical activity     Days per week: None     Minutes per session: None    Stress: None   Relationships    Social connections     Talks on phone: None     Gets together: None     Attends [employer] service: None     Active member of club or organization: None     Attends meetings of clubs or organizations: None     Relationship status: None    Intimate partner violence     Fear of current or ex partner: None     Emotionally abused: None     Physically abused: None     Forced sexual activity: None   Other Topics Concern    None   Social History Narrative    None       FAMILY HISTORY:  Family History   Problem Relation Age of Onset    High Blood Pressure Mother     Heart Disease Father     High Cholesterol Sister     Cancer Sister     Diabetes Sister         Current Outpatient Medications   Medication Sig Dispense Refill    warfarin (COUMADIN) 5 MG tablet Take 5 mg by mouth See Admin Instructions 5mg M/W/F, 4mg all other days      potassium chloride (KLOR-CON M) 10 MEQ extended release tablet Take 10 mEq by mouth daily       allopurinol (ZYLOPRIM) 100 MG tablet Take 100 mg by mouth daily      HYDROcodone-acetaminophen (NORCO) 7.5-325 MG per tablet Take 7.5-500 tablets by mouth 3 times daily as needed.  digoxin (LANOXIN) 250 MCG tablet Take 250 mcg by mouth daily      empagliflozin (JARDIANCE) 10 MG tablet Take 1 tablet by mouth daily      vitamin D (ERGOCALCIFEROL) 62918 units CAPS capsule Take 50,000 Units by mouth once a week      insulin regular human (HUMULIN R U-500 KWIKPEN) 500 UNIT/ML SOPN concentrated injection pen Inject 135 Units into the skin 2 times daily      LORazepam (ATIVAN) 1 MG tablet Take 1 mg by mouth daily as needed.  SITagliptin-metFORMIN HCl -1000 MG TB24 Take 1 tablet by mouth nightly      esomeprazole (NEXIUM) 40 MG delayed release capsule Take 40 mg by mouth 2 times daily      furosemide (LASIX) 40 MG tablet Take 80 mg by mouth daily      gabapentin (NEURONTIN) 300 MG capsule Take 300 mg by mouth 3 times daily. Take 2 tabs TID      carvedilol (COREG) 12.5 MG tablet       pravastatin (PRAVACHOL) 80 MG tablet       PROAIR  (90 BASE) MCG/ACT inhaler Inhale 2 puffs into the lungs daily       aspirin 81 MG tablet Take 81 mg by mouth nightly        No current facility-administered medications for this visit.          REVIEW OF SYSTEMS:    Constitutional: no fever, no night sweats,  fatigue;   HEENT: no blurring of vision, no double vision, no hearing anemia. MCV 75, still low  Relevant Imaging studies/reviewed by me:  No results found. ASSESSMENT    Orders Placed This Encounter   Procedures    Iron and TIBC     Standing Status:   Future     Standing Expiration Date:   1/5/2022     Order Specific Question:   Is Patient Fasting? Answer:   no     Order Specific Question:   No of Hours? Answer:   0    Ferritin     Standing Status:   Future     Standing Expiration Date:   1/5/2022      Ramses Goncalves was seen today for follow-up. Diagnoses and all orders for this visit:    Iron deficiency anemia, unspecified iron deficiency anemia type  -     Iron and TIBC; Future  -     Ferritin; Future    Care plan discussed with patient        Iron deficiency anemia status post IV iron  Hemoglobin 14.4/MCV 75. Improved but still with microcytic indexes. We will repeat iron profile today. F/u Dr. Shira Martinez for consideration of repeat EGD/colonoscopy or consideration of capsule endoscopy.       At risk for Lung Cancer-CT low-dose lung cancer screening was unremarkable 10/9/2020.     Congestive heart failure-continue management with primary care provider. Stable.      A fib. -Continue chronic anticoagulation as per PCP. Warfarin      Smoking cessation- She has not quit yet. Again strongly encouraged to quit.       Hypertensionfollow-up with PCP.     Plan:   · Follow-up 4 months  · F/U Dr Shira Martinez for colonoscopy and EGD-no f/u scheduled  · Lung Cancer screening with CT low-dose October 2021  · CBC, ferritin, TIBC, iron saturation        Follow Up:     Return in about 4 months (around 5/5/2021) for Appointment with Dr. Aren Huang in [place]. Data Kun Pandya am scribing for Sonia Martinez MD. Electronically signed by Carolyn Telles on 1/5/2021 at 11:12 AM CST. I, Dr Martha Callahan, personally performed the services described in this documentation as scribed by Carolyn Telles, RN in my presence and is both accurate and complete.     I have seen, examined and reviewed this patient medication list, appropriate labs and imaging studies. I reviewed relevant medical records and others physicians notes. I discussed the plans of care with the patient. I answered all the questions to the patients satisfaction. I have also reviewed the chief complaint (CC) and part of the history (History of Present Illness (HPI), Past Family Social History [facility]), or Review of Systems (ROS) and made changes when appropriated.        (Please note that portions of this note were completed with a voice recognition program. Efforts were made to edit the dictations but occasionally words are mis-transcribed.)

## 2021-01-05 ENCOUNTER — OFFICE VISIT (OUTPATIENT)
Dept: HEMATOLOGY | Age: 71
End: 2021-01-05
Payer: MEDICARE

## 2021-01-05 VITALS
TEMPERATURE: 97.9 F | WEIGHT: 217 LBS | DIASTOLIC BLOOD PRESSURE: 62 MMHG | SYSTOLIC BLOOD PRESSURE: 152 MMHG | HEART RATE: 104 BPM | BODY MASS INDEX: 39.69 KG/M2 | OXYGEN SATURATION: 90 %

## 2021-01-05 DIAGNOSIS — D50.9 IRON DEFICIENCY ANEMIA, UNSPECIFIED IRON DEFICIENCY ANEMIA TYPE: Primary | ICD-10-CM

## 2021-01-05 DIAGNOSIS — Z71.89 CARE PLAN DISCUSSED WITH PATIENT: ICD-10-CM

## 2021-01-05 PROCEDURE — 1090F PRES/ABSN URINE INCON ASSESS: CPT | Performed by: INTERNAL MEDICINE

## 2021-01-05 PROCEDURE — G8427 DOCREV CUR MEDS BY ELIG CLIN: HCPCS | Performed by: INTERNAL MEDICINE

## 2021-01-05 PROCEDURE — G8417 CALC BMI ABV UP PARAM F/U: HCPCS | Performed by: INTERNAL MEDICINE

## 2021-01-05 PROCEDURE — 4040F PNEUMOC VAC/ADMIN/RCVD: CPT | Performed by: INTERNAL MEDICINE

## 2021-01-05 PROCEDURE — 3017F COLORECTAL CA SCREEN DOC REV: CPT | Performed by: INTERNAL MEDICINE

## 2021-01-05 PROCEDURE — G8484 FLU IMMUNIZE NO ADMIN: HCPCS | Performed by: INTERNAL MEDICINE

## 2021-01-05 PROCEDURE — G8400 PT W/DXA NO RESULTS DOC: HCPCS | Performed by: INTERNAL MEDICINE

## 2021-01-05 PROCEDURE — 99212 OFFICE O/P EST SF 10 MIN: CPT | Performed by: INTERNAL MEDICINE

## 2021-01-05 PROCEDURE — 1123F ACP DISCUSS/DSCN MKR DOCD: CPT | Performed by: INTERNAL MEDICINE

## 2021-01-05 PROCEDURE — 4004F PT TOBACCO SCREEN RCVD TLK: CPT | Performed by: INTERNAL MEDICINE

## 2021-01-14 RX ORDER — PRAVASTATIN SODIUM 20 MG
TABLET ORAL
Qty: 90 TABLET | Refills: 0 | Status: SHIPPED | OUTPATIENT
Start: 2021-01-14 | End: 2021-04-15

## 2021-03-11 ENCOUNTER — OFFICE VISIT (OUTPATIENT)
Dept: ENDOCRINOLOGY | Facility: CLINIC | Age: 71
End: 2021-03-11

## 2021-03-11 VITALS
HEIGHT: 62 IN | DIASTOLIC BLOOD PRESSURE: 82 MMHG | HEART RATE: 71 BPM | BODY MASS INDEX: 40.32 KG/M2 | WEIGHT: 219.1 LBS | SYSTOLIC BLOOD PRESSURE: 140 MMHG | OXYGEN SATURATION: 93 %

## 2021-03-11 DIAGNOSIS — E11.59 HYPERTENSION ASSOCIATED WITH DIABETES (HCC): ICD-10-CM

## 2021-03-11 DIAGNOSIS — IMO0002 DIABETES MELLITUS TYPE 2, UNCONTROLLED, WITH COMPLICATIONS: Primary | ICD-10-CM

## 2021-03-11 DIAGNOSIS — E08.42 DIABETIC POLYNEUROPATHY ASSOCIATED WITH DIABETES MELLITUS DUE TO UNDERLYING CONDITION (HCC): ICD-10-CM

## 2021-03-11 DIAGNOSIS — I15.2 HYPERTENSION ASSOCIATED WITH DIABETES (HCC): ICD-10-CM

## 2021-03-11 DIAGNOSIS — E55.9 VITAMIN D DEFICIENCY: ICD-10-CM

## 2021-03-11 DIAGNOSIS — E78.00 HYPERCHOLESTEROLEMIA: ICD-10-CM

## 2021-03-11 PROCEDURE — 95251 CONT GLUC MNTR ANALYSIS I&R: CPT | Performed by: INTERNAL MEDICINE

## 2021-03-11 PROCEDURE — 99214 OFFICE O/P EST MOD 30 MIN: CPT | Performed by: INTERNAL MEDICINE

## 2021-03-11 RX ORDER — PEN NEEDLE, DIABETIC 30 GX3/16"
1 NEEDLE, DISPOSABLE MISCELLANEOUS 4 TIMES DAILY
Qty: 300 EACH | Refills: 3 | Status: SHIPPED | OUTPATIENT
Start: 2021-03-11 | End: 2021-05-28 | Stop reason: SDUPTHER

## 2021-03-11 RX ORDER — INSULIN HUMAN 500 [IU]/ML
INJECTION, SOLUTION SUBCUTANEOUS
Qty: 6 PEN | Refills: 3 | Status: SHIPPED | OUTPATIENT
Start: 2021-03-11 | End: 2022-04-07

## 2021-03-11 RX ORDER — SITAGLIPTIN AND METFORMIN HYDROCHLORIDE 1000; 100 MG/1; MG/1
1 TABLET, FILM COATED, EXTENDED RELEASE ORAL DAILY
Qty: 90 TABLET | Refills: 3 | Status: SHIPPED | OUTPATIENT
Start: 2021-03-11 | End: 2022-04-07

## 2021-03-11 RX ORDER — EMPAGLIFLOZIN 10 MG/1
1 TABLET, FILM COATED ORAL DAILY
Qty: 90 TABLET | Refills: 3 | Status: SHIPPED | OUTPATIENT
Start: 2021-03-11 | End: 2022-04-14 | Stop reason: SDUPTHER

## 2021-03-11 RX ORDER — GABAPENTIN 300 MG/1
900 CAPSULE ORAL 3 TIMES DAILY
Qty: 270 CAPSULE | Refills: 5 | Status: SHIPPED | OUTPATIENT
Start: 2021-03-11 | End: 2023-02-02

## 2021-03-11 NOTE — PROGRESS NOTES
"  Subjective    Milagros Nance is a 70 y.o. female. she is here today for follow-up for her chief complaint of Type 2 diabetes            Duration Dx at age 52 years old      Complications - peripheral neuropathy, CAD       Alleviating Factors: Compliance        Current diet  in general, a \"healthy\" diet       Current exercise none     Current monitoring regimen: home blood tests - using kervin 4 x daily       Hypoglycemia minimized          Review of Systems  Review of Systems   Respiratory: Positive for shortness of breath.    Cardiovascular: Positive for palpitations.        Objective    /82   Pulse 71   Ht 157.5 cm (62\")   Wt 99.4 kg (219 lb 1.6 oz)   SpO2 93%   BMI 40.07 kg/m²    Physical Exam  Constitutional:       Appearance: Normal appearance.   HENT:      Head: Normocephalic.   Cardiovascular:      Rate and Rhythm: Normal rate.   Musculoskeletal:      Cervical back: Normal range of motion and neck supple.      Right lower leg: No edema.      Left lower leg: No edema.   Neurological:      Mental Status: She is alert.       Foot Callus, ulcer , hammer toes       Assessment/Plan      1. Diabetes mellitus type 2, uncontrolled, with complications (CMS/HCC)    2. Hypertension associated with diabetes (CMS/HCC)    3. Vitamin D deficiency    4. Diabetic polyneuropathy associated with diabetes mellitus due to underlying condition (CMS/HCC)    5. Hypercholesterolemia    .    Medications prescribed:      Orders placed during this encounter include:  No orders of the defined types were placed in this encounter.     Diabetes Type 2   Glycemic Management         Lipodystropic diabetes         Janumet 100/1000 mg xr daily       jardiance 10 mg daily      Humulin U 500  85 bid most of the time,     Add 10 for lunch and qhs !       Kervin 2 weeks reviewed    In range 50%    Type 2 diabetes w hyperglycemia      Microvascular Complication Monitoring:          Neuropathy, yes      On cymbalta 60 mg daily     On " Neurontin    600 mg am , 300 mg noon , 300 mg qhs     increase to 900 , TID        2.Hypertension:      Per PCP and Cardiology       3.Hypercholesterolemia     Pravastatin 20 mg qhs            No follow-ups on file.        This document has been electronically signed by Darek Pascal MD on March 11, 2021 15:14 CST

## 2021-04-09 ENCOUNTER — DOCUMENTATION (OUTPATIENT)
Dept: ENDOCRINOLOGY | Facility: CLINIC | Age: 71
End: 2021-04-09

## 2021-04-15 RX ORDER — PRAVASTATIN SODIUM 20 MG
TABLET ORAL
Qty: 90 TABLET | Refills: 0 | Status: SHIPPED | OUTPATIENT
Start: 2021-04-15 | End: 2021-08-16

## 2021-04-20 ENCOUNTER — TELEPHONE (OUTPATIENT)
Dept: ENDOCRINOLOGY | Facility: CLINIC | Age: 71
End: 2021-04-20

## 2021-04-20 NOTE — TELEPHONE ENCOUNTER
[facility] vm needs most recent office notes showing using sensors [phone] option 4

## 2021-04-21 ENCOUNTER — DOCUMENTATION (OUTPATIENT)
Dept: ENDOCRINOLOGY | Facility: CLINIC | Age: 71
End: 2021-04-21

## 2021-05-10 NOTE — PROGRESS NOTES
father MI 39, sister cardiomyopathy 63 yo    Fibromyalgia     GERD (gastroesophageal reflux disease)     Hyperlipidemia     Hypertension     Irritable bowel syndrome     Morbid obesity ([address]     Neuropathy     Nicotine dependence     Osteoarthritis     Osteopenia     Paroxysmal atrial fibrillation (HCC)     TIA (transient ischemic attack)     Type II or unspecified type diabetes mellitus without mention of complication, not stated as uncontrolled     Unspecified sleep apnea     Bi-pap    Varicose veins           PAST SURGICAL HISTORY:  Past Surgical History:   Procedure Laterality Date     SECTION      two c-sections    COLONOSCOPY      DILATION AND CURETTAGE OF UTERUS N/A 2016    EXAM UNDER ANESTHESIA; DILATATION AND CURETTAGE HYSTEROSCOPY performed by Robin Redmodn MD at [facility] 9      esophagus stretced 3x's    LAPAROSCOPY OOPHORECTOMY N/A 2016    DIAGNOSTIC LAPAROSCOPY  performed by Robin Redmond MD at [address]      nerve conduction, nerve release on right hand    OTHER SURGICAL HISTORY      ear surgery removed bone replace with wiring (right)    VARICOSE VEIN SURGERY Left 13 DJ    LGSV ablation    VARICOSE VEIN SURGERY Right 2013 [facility]    RGSV ablation    WRIST SURGERY      carpel tunnel bilateral wrist        SOCIAL HISTORY:  Social History     Socioeconomic History    Marital status:      Spouse name: None    Number of children: None    Years of education: None    Highest education level: None   Occupational History    None   Social Needs    Financial resource strain: None    Food insecurity     Worry: None     Inability: None    Transportation needs     Medical: None     Non-medical: None   Tobacco Use    Smoking status: Current Every Day Smoker     Packs/day: 0.50     Years: 45.00     Pack years: 22.50     Types: Cigarettes    Smokeless tobacco: Never Used   Substance and Sexual Activity  Alcohol use: No     Alcohol/week: 0.0 standard drinks    Drug use: No    Sexual activity: Not Currently   Lifestyle    Physical activity     Days per week: None     Minutes per session: None    Stress: None   Relationships    Social connections     Talks on phone: None     Gets together: None     Attends Scientology service: None     Active member of club or organization: None     Attends meetings of clubs or organizations: None     Relationship status: None    Intimate partner violence     Fear of current or ex partner: None     Emotionally abused: None     Physically abused: None     Forced sexual activity: None   Other Topics Concern    None   Social History Narrative    None       FAMILY HISTORY:  Family History   Problem Relation Age of Onset    High Blood Pressure Mother     Heart Disease Father     High Cholesterol Sister     Cancer Sister     Diabetes Sister         Current Outpatient Medications   Medication Sig Dispense Refill    MAGNESIUM OXIDE 400 PO Take 1 tablet by mouth 2 times daily      clopidogrel (PLAVIX) 75 MG tablet Take 75 mg by mouth daily      digoxin (LANOXIN) 125 MCG tablet Take 125 mcg by mouth daily      dilTIAZem (CARDIZEM CD) 180 MG extended release capsule       Calcium Carbonate-Vitamin D (OYSTER SHELL CALCIUM/D) 250-125 MG-UNIT TABS Take 1 tablet by mouth once a week      DULoxetine (CYMBALTA) 60 MG extended release capsule       metoprolol succinate (TOPROL XL) 25 MG extended release tablet       nitroGLYCERIN (NITROSTAT) 0.4 MG SL tablet Place 0.4 mg under the tongue as needed      pravastatin (PRAVACHOL) 20 MG tablet Take 20 mg by mouth nightly      SITagliptin-metFORMIN HCl ER (JANUMET XR) 100-1000 MG TB24 Take 1 tablet by mouth daily      warfarin (COUMADIN) 5 MG tablet Take 5 mg by mouth See Admin Instructions 5mg M/W/F, 4mg all other days      potassium chloride (KLOR-CON M) 10 MEQ extended release tablet Take 10 mEq by mouth daily       allopurinol (ZYLOPRIM) 100 MG tablet Take 100 mg by mouth daily      HYDROcodone-acetaminophen (NORCO) 7.5-325 MG per tablet Take 7.5-500 tablets by mouth 3 times daily as needed.  empagliflozin (JARDIANCE) 10 MG tablet Take 1 tablet by mouth daily      insulin regular human (HUMULIN R U-500 KWIKPEN) 500 UNIT/ML SOPN concentrated injection pen Inject 135 Units into the skin 2 times daily      esomeprazole (NEXIUM) 40 MG delayed release capsule Take 40 mg by mouth 2 times daily      furosemide (LASIX) 40 MG tablet Take 80 mg by mouth daily      gabapentin (NEURONTIN) 300 MG capsule Take 300 mg by mouth 3 times daily. Take 2 tabs TID       No current facility-administered medications for this visit. REVIEW OF SYSTEMS:    Constitutional: no fever, no night sweats,  fatigue;   HEENT: no blurring of vision, no double vision, no hearing difficulty, no tinnitus,no ulceration, no dysphagia  Lungs: no cough, shortness of breath, no wheeze;   CVS: no palpitation, no chest pain,  shortness of breath;  GI: no abdominal pain, no nausea , no vomiting, no constipation;   MARY GRACE: no dysuria, frequency and urgency, no hematuria, no kidney stones;   Musculoskeletal: no joint pain, swelling , stiffness;   Endocrine: no polyuria, polydypsia, no cold or heat intolerence; Hematology/lymphatic: no easy brusing or bleeding, no hx of clotting disorder; no peripheral adenopathy. Dermatology: no skin rash, no eczema, no pruritis;   Psychiatry: no depression, no anxiety,no panic attacks, no suicide ideation;    Neurology: no syncope, no seizures, no numbness or tingling of hands, no numbness or tingling of feet, no paresis;     PHYSICAL EXAM:    Vitals signs:  /82   Pulse 90   Temp 98.1 °F (36.7 °C)   Wt 224 lb (101.6 kg)   LMP 03/01/2013 (Within Months)   SpO2 90%   BMI 40.97 kg/m²    Pain scale:        CONSTITUTIONAL: Alert, appropriate, no acute distress, looks fatigued  EYES: Non icteric, EOM intact, pupils equal round and reactive to light and accommodation. ENT: Oral mucus membranes moist, no oral pharyngeal lesions. External inspection of ears and nose are normal.   NECK: Supple, no masses. No palpable thyroid mass    CHEST/LUNGS: CTA bilaterally, normal respiratory effort   CARDIOVASCULAR: RRR, no murmurs. No lower extremity edema   ABDOMEN: soft non-tender, active bowel sounds, no hepatosplenomegaly. No palpable masses. EXTREMITIES: warm, Full ROM of all fours extremities. No focal weakness. SKIN: warm, dry with no rashes or lesions  LYMPH: No cervical, clavicular, axillary, or inguinal lymphadenopathy  NEUROLOGIC: follows commands, non focal.   PSYCH: mood and affect appropriate. Alert and oriented to time and place and person. Relevant Lab findings/reviewed by me:  1/5/21 anemia labs:  Iron 30  TIBC 437  UIBC 407  Iron sat 7%  Ferritin 15  5/3/2021  CBC:  WBC 12.5, ANC 81%  HGB 12.2, MCV 76  PLT396,000       ASSESSMENT    Orders Placed This Encounter   Procedures    CT lung screen [Initial/Annual]     Age: 78 y.o. Smoking History:   Social History    Tobacco Use      Smoking status: Current Every Day Smoker        Packs/day: 0.50        Years: 45.00        Pack years: 22.5        Types: Cigarettes      Smokeless tobacco: Never Used    Alcohol use: No      Alcohol/week: 0.0 standard drinks    Drug use: No    Pack years: 22.5  Last CT lung screen: 10/9/2020     Standing Status:   Future     Standing Expiration Date:   5/11/2022     Order Specific Question:   Is there documentation of shared decision making? Answer:   Yes     Order Specific Question:   Does the patient show any signs or symptoms of lung cancer? Answer:   No     Order Specific Question:   Is this the first (baseline) CT or an annual exam?     Answer: Annual [2]     Order Specific Question:   Is this a low dose CT or a routine CT? Answer:   Low Dose CT [1]     Order Specific Question:   Smoking Status? Answer:    Former Smoker [4] Order Specific Question:   Date quit smoking? (must be within 15 years)     Answer:   4/27/2021     Order Specific Question:   Smoking packs per day? Answer:   0.5     Order Specific Question:   Years smoking? Answer:   39    CBC Auto Differential     Standing Status:   Future     Standing Expiration Date:   5/11/2022    Comprehensive Metabolic Panel     Standing Status:   Future     Standing Expiration Date:   5/11/2022    Iron and TIBC     Standing Status:   Future     Standing Expiration Date:   5/11/2022     Order Specific Question:   Is Patient Fasting? Answer:   no     Order Specific Question:   No of Hours? Answer:   n/a    Ferritin     Standing Status:   Future     Standing Expiration Date:   5/11/2022      Shaista Mercedes was seen today for follow-up. Diagnoses and all orders for this visit:    Iron deficiency anemia, unspecified iron deficiency anemia type  -     CBC Auto Differential; Future  -     Comprehensive Metabolic Panel; Future  -     Iron and TIBC; Future  -     Ferritin; Future    Personal history of tobacco use, presenting hazards to health  -     CT lung screen [Initial/Annual]; Future    Care plan discussed with patient    Healthcare maintenance        Iron deficiency anemia  November 2018colonoscopy was unremarkable  Anemia labs 1/5/2021 showed Iron 30, TIBC 437, UIBC 407, Iron sat 7%, Ferritin 15.   5/3/2021hemoglobin 12.2/MCV 76.9  Poor tolerance to p.o. iron supplementation. Patient still feeling quite fatigued. 5/11/2021recommend IV Injectafer 750 mg x 2 doses    Health Maintenance: The patient is encouraged to follow-up with primary care regularly for further recommendations regarding age appropriated screening for cancer, well-being visit (preventative  measures), follow-up and treatment of other medical comorbidities. We will order mammogram.       At risk for Lung Cancer-CT low-dose lung cancer screening was unremarkable 10/9/2020. Will repeat before next visit. Patient has multiple more than 50 years.     Congestive heart failure-continue management with primary care provider. Stable.      A fib. -Continue chronic anticoagulation as per PCP. Warfarin      Smoking cessation- She has quit 2 weeks ago.      Hypertensionfollow-up with PCP.     Plan:   · Follow-up 6 months  · Lung Cancer screening with CT low-dose October 2021  · CBC, CMP, and Iron profile prior to next visit  · Recommend Injectafer 750 mg IV x2 doses        Follow Up:     No follow-ups on file. Data Remi Brownlee, rodo scribing for Katie Heard MD. Electronically signed by Neema Lay RN on 5/11/2021 at 11:19 AM CDT. I, Dr Julio Carrera, personally performed the services described in this documentation as scribed by Neema Lay RN in my presence and is both accurate and complete. I have seen, examined and reviewed this patient medication list, appropriate labs and imaging studies. I reviewed relevant medical records and others physicians notes. I discussed the plans of care with the patient. I answered all the questions to the patients satisfaction. I have also reviewed the chief complaint (CC) and part of the history (History of Present Illness (HPI), Past Family Social History [facility]), or Review of Systems (ROS) and made changes when appropriated.        (Please note that portions of this note were completed with a voice recognition program. Efforts were made to edit the dictations but occasionally words are mis-transcribed.)

## 2021-05-11 ENCOUNTER — TELEPHONE (OUTPATIENT)
Dept: HEMATOLOGY | Age: 71
End: 2021-05-11

## 2021-05-11 ENCOUNTER — CLINICAL DOCUMENTATION (OUTPATIENT)
Dept: HEMATOLOGY | Age: 71
End: 2021-05-11

## 2021-05-11 ENCOUNTER — OFFICE VISIT (OUTPATIENT)
Dept: HEMATOLOGY | Age: 71
End: 2021-05-11
Payer: MEDICARE

## 2021-05-11 VITALS
WEIGHT: 224 LBS | OXYGEN SATURATION: 90 % | TEMPERATURE: 98.1 F | HEART RATE: 90 BPM | DIASTOLIC BLOOD PRESSURE: 82 MMHG | SYSTOLIC BLOOD PRESSURE: 124 MMHG | BODY MASS INDEX: 40.97 KG/M2

## 2021-05-11 DIAGNOSIS — Z87.891 PERSONAL HISTORY OF TOBACCO USE, PRESENTING HAZARDS TO HEALTH: ICD-10-CM

## 2021-05-11 DIAGNOSIS — D50.9 IRON DEFICIENCY ANEMIA, UNSPECIFIED IRON DEFICIENCY ANEMIA TYPE: Primary | ICD-10-CM

## 2021-05-11 DIAGNOSIS — Z71.89 CARE PLAN DISCUSSED WITH PATIENT: ICD-10-CM

## 2021-05-11 DIAGNOSIS — Z00.00 HEALTHCARE MAINTENANCE: ICD-10-CM

## 2021-05-11 PROCEDURE — 4004F PT TOBACCO SCREEN RCVD TLK: CPT | Performed by: INTERNAL MEDICINE

## 2021-05-11 PROCEDURE — 3017F COLORECTAL CA SCREEN DOC REV: CPT | Performed by: INTERNAL MEDICINE

## 2021-05-11 PROCEDURE — 1123F ACP DISCUSS/DSCN MKR DOCD: CPT | Performed by: INTERNAL MEDICINE

## 2021-05-11 PROCEDURE — G8400 PT W/DXA NO RESULTS DOC: HCPCS | Performed by: INTERNAL MEDICINE

## 2021-05-11 PROCEDURE — 4040F PNEUMOC VAC/ADMIN/RCVD: CPT | Performed by: INTERNAL MEDICINE

## 2021-05-11 PROCEDURE — G8417 CALC BMI ABV UP PARAM F/U: HCPCS | Performed by: INTERNAL MEDICINE

## 2021-05-11 PROCEDURE — 1090F PRES/ABSN URINE INCON ASSESS: CPT | Performed by: INTERNAL MEDICINE

## 2021-05-11 PROCEDURE — G8427 DOCREV CUR MEDS BY ELIG CLIN: HCPCS | Performed by: INTERNAL MEDICINE

## 2021-05-11 PROCEDURE — 99214 OFFICE O/P EST MOD 30 MIN: CPT | Performed by: INTERNAL MEDICINE

## 2021-05-11 RX ORDER — PRAVASTATIN SODIUM 20 MG
20 TABLET ORAL NIGHTLY
COMMUNITY

## 2021-05-11 RX ORDER — DILTIAZEM HYDROCHLORIDE 180 MG/1
CAPSULE, COATED, EXTENDED RELEASE ORAL
COMMUNITY
Start: 2021-05-06

## 2021-05-11 RX ORDER — SELENIUM 50 MCG
1 TABLET ORAL WEEKLY
COMMUNITY

## 2021-05-11 RX ORDER — DIGOXIN 125 MCG
125 TABLET ORAL DAILY
COMMUNITY
Start: 2021-03-03 | End: 2022-03-04

## 2021-05-11 RX ORDER — SITAGLIPTIN AND METFORMIN HYDROCHLORIDE 100; 1000 MG/1; MG/1
1 TABLET, FILM COATED, EXTENDED RELEASE ORAL DAILY
COMMUNITY
Start: 2021-03-11

## 2021-05-11 RX ORDER — DULOXETIN HYDROCHLORIDE 60 MG/1
CAPSULE, DELAYED RELEASE ORAL
COMMUNITY
Start: 2021-04-15

## 2021-05-11 RX ORDER — NITROGLYCERIN 0.4 MG/1
0.4 TABLET SUBLINGUAL PRN
COMMUNITY
Start: 2021-03-13 | End: 2022-08-30

## 2021-05-11 RX ORDER — METOPROLOL SUCCINATE 25 MG/1
TABLET, EXTENDED RELEASE ORAL
COMMUNITY
Start: 2021-05-06

## 2021-05-11 RX ORDER — CLOPIDOGREL BISULFATE 75 MG/1
75 TABLET ORAL DAILY
COMMUNITY
Start: 2021-03-14 | End: 2022-03-14

## 2021-05-11 NOTE — PROGRESS NOTES
Iron deficiency anemia with poor tolerance to oral iron supplementation:    Schedule for IV iron replacement Injectafer 750 mg x 2 doses

## 2021-05-20 ENCOUNTER — HOSPITAL ENCOUNTER (OUTPATIENT)
Dept: INFUSION THERAPY | Age: 71
Discharge: HOME | End: 2021-05-20
Payer: MEDICARE

## 2021-05-20 DIAGNOSIS — D50.8 IRON DEFICIENCY ANEMIA SECONDARY TO INADEQUATE DIETARY IRON INTAKE: ICD-10-CM

## 2021-05-20 DIAGNOSIS — D50.9 IRON DEFICIENCY ANEMIA, UNSPECIFIED IRON DEFICIENCY ANEMIA TYPE: Primary | ICD-10-CM

## 2021-05-20 LAB
BASOPHILS ABSOLUTE: 0.06 K/UL (ref 0.01–0.08)
BASOPHILS RELATIVE PERCENT: 0.5 % (ref 0.1–1.2)
EOSINOPHILS ABSOLUTE: 0.13 K/UL (ref 0.04–0.54)
EOSINOPHILS RELATIVE PERCENT: 1 % (ref 0.7–7)
HCT VFR BLD CALC: 43.7 % (ref 34.1–44.9)
HEMOGLOBIN: 12.8 G/DL (ref 11.2–15.7)
LYMPHOCYTES ABSOLUTE: 1.6 K/UL (ref 1.18–3.74)
LYMPHOCYTES RELATIVE PERCENT: 12.3 % (ref 19.3–53.1)
MCH RBC QN AUTO: 22 PG (ref 25.6–32.2)
MCHC RBC AUTO-ENTMCNC: 29.3 G/DL (ref 32.3–35.5)
MCV RBC AUTO: 75.2 FL (ref 79.4–94.8)
MONOCYTES ABSOLUTE: 0.98 K/UL (ref 0.24–0.82)
MONOCYTES RELATIVE PERCENT: 7.5 % (ref 4.7–12.5)
NEUTROPHILS ABSOLUTE: 10.29 K/UL (ref 1.56–6.13)
NEUTROPHILS RELATIVE PERCENT: 78.7 % (ref 34–71.1)
PDW BLD-RTO: 20.2 % (ref 11.7–14.4)
PLATELET # BLD: 343 K/UL (ref 182–369)
PMV BLD AUTO: 9 FL (ref 7.4–10.4)
RBC # BLD: 5.81 M/UL (ref 3.93–5.22)
WBC # BLD: 13.06 K/UL (ref 3.98–10.04)

## 2021-05-20 PROCEDURE — 2580000003 HC RX 258: Performed by: INTERNAL MEDICINE

## 2021-05-20 PROCEDURE — 6360000002 HC RX W HCPCS: Performed by: INTERNAL MEDICINE

## 2021-05-20 PROCEDURE — 36415 COLL VENOUS BLD VENIPUNCTURE: CPT

## 2021-05-20 PROCEDURE — 96365 THER/PROPH/DIAG IV INF INIT: CPT

## 2021-05-20 PROCEDURE — 85025 COMPLETE CBC W/AUTO DIFF WBC: CPT

## 2021-05-20 RX ORDER — SODIUM CHLORIDE 9 MG/ML
INJECTION, SOLUTION INTRAVENOUS CONTINUOUS
Status: CANCELLED | OUTPATIENT
Start: 2021-05-27

## 2021-05-20 RX ORDER — DIPHENHYDRAMINE HYDROCHLORIDE 50 MG/ML
50 INJECTION INTRAMUSCULAR; INTRAVENOUS ONCE
Status: CANCELLED | OUTPATIENT
Start: 2021-05-20 | End: 2021-05-20

## 2021-05-20 RX ORDER — SODIUM CHLORIDE 0.9 % (FLUSH) 0.9 %
5-40 SYRINGE (ML) INJECTION PRN
Status: CANCELLED | OUTPATIENT
Start: 2021-05-27

## 2021-05-20 RX ORDER — HEPARIN SODIUM (PORCINE) LOCK FLUSH IV SOLN 100 UNIT/ML 100 UNIT/ML
500 SOLUTION INTRAVENOUS PRN
Status: CANCELLED | OUTPATIENT
Start: 2021-05-20

## 2021-05-20 RX ORDER — METHYLPREDNISOLONE SODIUM SUCCINATE 125 MG/2ML
125 INJECTION, POWDER, LYOPHILIZED, FOR SOLUTION INTRAMUSCULAR; INTRAVENOUS ONCE
Status: CANCELLED | OUTPATIENT
Start: 2021-05-20 | End: 2021-05-20

## 2021-05-20 RX ORDER — SODIUM CHLORIDE 0.9 % (FLUSH) 0.9 %
5-40 SYRINGE (ML) INJECTION PRN
Status: CANCELLED | OUTPATIENT
Start: 2021-05-20

## 2021-05-20 RX ORDER — DIPHENHYDRAMINE HYDROCHLORIDE 50 MG/ML
50 INJECTION INTRAMUSCULAR; INTRAVENOUS ONCE
Status: CANCELLED | OUTPATIENT
Start: 2021-05-27 | End: 2021-05-27

## 2021-05-20 RX ORDER — SODIUM CHLORIDE 9 MG/ML
INJECTION, SOLUTION INTRAVENOUS CONTINUOUS
Status: DISCONTINUED | OUTPATIENT
Start: 2021-05-20 | End: 2021-05-21 | Stop reason: HOSPADM

## 2021-05-20 RX ORDER — SODIUM CHLORIDE 9 MG/ML
25 INJECTION, SOLUTION INTRAVENOUS PRN
Status: CANCELLED | OUTPATIENT
Start: 2021-05-20

## 2021-05-20 RX ORDER — EPINEPHRINE 1 MG/ML
0.3 INJECTION, SOLUTION, CONCENTRATE INTRAVENOUS PRN
Status: CANCELLED | OUTPATIENT
Start: 2021-05-27

## 2021-05-20 RX ORDER — METHYLPREDNISOLONE SODIUM SUCCINATE 125 MG/2ML
125 INJECTION, POWDER, LYOPHILIZED, FOR SOLUTION INTRAMUSCULAR; INTRAVENOUS ONCE
Status: CANCELLED | OUTPATIENT
Start: 2021-05-27 | End: 2021-05-27

## 2021-05-20 RX ORDER — EPINEPHRINE 1 MG/ML
0.3 INJECTION, SOLUTION, CONCENTRATE INTRAVENOUS PRN
Status: CANCELLED | OUTPATIENT
Start: 2021-05-20

## 2021-05-20 RX ORDER — HEPARIN SODIUM (PORCINE) LOCK FLUSH IV SOLN 100 UNIT/ML 100 UNIT/ML
500 SOLUTION INTRAVENOUS PRN
Status: CANCELLED | OUTPATIENT
Start: 2021-05-27

## 2021-05-20 RX ORDER — SODIUM CHLORIDE 9 MG/ML
25 INJECTION, SOLUTION INTRAVENOUS PRN
Status: CANCELLED | OUTPATIENT
Start: 2021-05-27

## 2021-05-20 RX ORDER — SODIUM CHLORIDE 9 MG/ML
INJECTION, SOLUTION INTRAVENOUS CONTINUOUS
Status: CANCELLED | OUTPATIENT
Start: 2021-05-20

## 2021-05-20 RX ADMIN — SODIUM CHLORIDE: 9 INJECTION, SOLUTION INTRAVENOUS at 14:06

## 2021-05-20 RX ADMIN — FERRIC CARBOXYMALTOSE INJECTION 750 MG: 50 INJECTION, SOLUTION INTRAVENOUS at 14:05

## 2021-05-27 ENCOUNTER — HOSPITAL ENCOUNTER (OUTPATIENT)
Dept: INFUSION THERAPY | Age: 71
Discharge: HOME | End: 2021-05-27
Payer: MEDICARE

## 2021-05-27 VITALS
HEART RATE: 73 BPM | SYSTOLIC BLOOD PRESSURE: 144 MMHG | WEIGHT: 220.8 LBS | TEMPERATURE: 98 F | HEIGHT: 63 IN | OXYGEN SATURATION: 91 % | DIASTOLIC BLOOD PRESSURE: 56 MMHG | BODY MASS INDEX: 39.12 KG/M2

## 2021-05-27 DIAGNOSIS — D50.8 IRON DEFICIENCY ANEMIA SECONDARY TO INADEQUATE DIETARY IRON INTAKE: Primary | ICD-10-CM

## 2021-05-27 LAB
BASOPHILS ABSOLUTE: 0.05 K/UL (ref 0.01–0.08)
BASOPHILS RELATIVE PERCENT: 0.4 % (ref 0.1–1.2)
EOSINOPHILS ABSOLUTE: 0.15 K/UL (ref 0.04–0.54)
EOSINOPHILS RELATIVE PERCENT: 1.3 % (ref 0.7–7)
HCT VFR BLD CALC: 44.9 % (ref 34.1–44.9)
HEMOGLOBIN: 13.2 G/DL (ref 11.2–15.7)
LYMPHOCYTES ABSOLUTE: 1.52 K/UL (ref 1.18–3.74)
LYMPHOCYTES RELATIVE PERCENT: 13.2 % (ref 19.3–53.1)
MCH RBC QN AUTO: 22.8 PG (ref 25.6–32.2)
MCHC RBC AUTO-ENTMCNC: 29.4 G/DL (ref 32.3–35.5)
MCV RBC AUTO: 77.5 FL (ref 79.4–94.8)
MONOCYTES ABSOLUTE: 0.91 K/UL (ref 0.24–0.82)
MONOCYTES RELATIVE PERCENT: 7.9 % (ref 4.7–12.5)
NEUTROPHILS ABSOLUTE: 8.92 K/UL (ref 1.56–6.13)
NEUTROPHILS RELATIVE PERCENT: 77.2 % (ref 34–71.1)
PDW BLD-RTO: 23 % (ref 11.7–14.4)
PLATELET # BLD: 291 K/UL (ref 182–369)
PMV BLD AUTO: 9 FL (ref 7.4–10.4)
RBC # BLD: 5.79 M/UL (ref 3.93–5.22)
WBC # BLD: 11.55 K/UL (ref 3.98–10.04)

## 2021-05-27 PROCEDURE — 2580000003 HC RX 258: Performed by: INTERNAL MEDICINE

## 2021-05-27 PROCEDURE — 96365 THER/PROPH/DIAG IV INF INIT: CPT

## 2021-05-27 PROCEDURE — 6360000002 HC RX W HCPCS: Performed by: INTERNAL MEDICINE

## 2021-05-27 PROCEDURE — 85025 COMPLETE CBC W/AUTO DIFF WBC: CPT

## 2021-05-27 RX ORDER — METHYLPREDNISOLONE SODIUM SUCCINATE 125 MG/2ML
125 INJECTION, POWDER, LYOPHILIZED, FOR SOLUTION INTRAMUSCULAR; INTRAVENOUS ONCE
Status: CANCELLED | OUTPATIENT
Start: 2021-05-27 | End: 2021-05-27

## 2021-05-27 RX ORDER — SODIUM CHLORIDE 9 MG/ML
INJECTION, SOLUTION INTRAVENOUS CONTINUOUS
Status: CANCELLED | OUTPATIENT
Start: 2021-05-27

## 2021-05-27 RX ORDER — SODIUM CHLORIDE 9 MG/ML
INJECTION, SOLUTION INTRAVENOUS CONTINUOUS
Status: DISCONTINUED | OUTPATIENT
Start: 2021-05-27 | End: 2021-05-28 | Stop reason: HOSPADM

## 2021-05-27 RX ORDER — EPINEPHRINE 1 MG/ML
0.3 INJECTION, SOLUTION, CONCENTRATE INTRAVENOUS PRN
Status: CANCELLED | OUTPATIENT
Start: 2021-05-27

## 2021-05-27 RX ORDER — DIPHENHYDRAMINE HYDROCHLORIDE 50 MG/ML
50 INJECTION INTRAMUSCULAR; INTRAVENOUS ONCE
Status: CANCELLED | OUTPATIENT
Start: 2021-05-27 | End: 2021-05-27

## 2021-05-27 RX ORDER — HEPARIN SODIUM (PORCINE) LOCK FLUSH IV SOLN 100 UNIT/ML 100 UNIT/ML
500 SOLUTION INTRAVENOUS PRN
Status: CANCELLED | OUTPATIENT
Start: 2021-05-27

## 2021-05-27 RX ORDER — SODIUM CHLORIDE 0.9 % (FLUSH) 0.9 %
5-40 SYRINGE (ML) INJECTION PRN
Status: CANCELLED | OUTPATIENT
Start: 2021-05-27

## 2021-05-27 RX ORDER — SODIUM CHLORIDE 9 MG/ML
25 INJECTION, SOLUTION INTRAVENOUS PRN
Status: CANCELLED | OUTPATIENT
Start: 2021-05-27

## 2021-05-27 RX ADMIN — FERRIC CARBOXYMALTOSE INJECTION 750 MG: 50 INJECTION, SOLUTION INTRAVENOUS at 13:39

## 2021-05-27 RX ADMIN — SODIUM CHLORIDE: 9 INJECTION, SOLUTION INTRAVENOUS at 13:40

## 2021-05-29 RX ORDER — PEN NEEDLE, DIABETIC 30 GX3/16"
1 NEEDLE, DISPOSABLE MISCELLANEOUS 4 TIMES DAILY
Qty: 300 EACH | Refills: 3 | Status: SHIPPED | OUTPATIENT
Start: 2021-05-29 | End: 2022-05-06

## 2021-06-09 ENCOUNTER — TELEMEDICINE (OUTPATIENT)
Dept: ENDOCRINOLOGY | Facility: CLINIC | Age: 71
End: 2021-06-09

## 2021-06-09 DIAGNOSIS — E55.9 VITAMIN D DEFICIENCY: ICD-10-CM

## 2021-06-09 DIAGNOSIS — E11.59 HYPERTENSION ASSOCIATED WITH DIABETES (HCC): ICD-10-CM

## 2021-06-09 DIAGNOSIS — I15.2 HYPERTENSION ASSOCIATED WITH DIABETES (HCC): ICD-10-CM

## 2021-06-09 DIAGNOSIS — E78.00 HYPERCHOLESTEROLEMIA: ICD-10-CM

## 2021-06-09 DIAGNOSIS — IMO0002 DIABETES MELLITUS TYPE 2, UNCONTROLLED, WITH COMPLICATIONS: Primary | ICD-10-CM

## 2021-06-09 PROCEDURE — 99214 OFFICE O/P EST MOD 30 MIN: CPT | Performed by: INTERNAL MEDICINE

## 2021-06-09 NOTE — PROGRESS NOTES
CC, Type 2 DM                                      This was a Telehealth Encounter. Benefits and Disadvantages of a Telehealth Visit were discussed and accepted by patient. .  Patient agreed to receive service through Telehealth visit as patient is being compliant with social distancing recommendations imparted by CDC.     You have chosen to receive care through a telehealth visit.  Do you consent to use a video/audio connection for your medical care today? Yes        History of Present Illness    70 y.o. female     Diabetes Type 2    Duration - More than 10 years    Complications -Neuropathy and CAD     Present Monitoring -      Fingersticks -     CGM - Kervin    Present Regimen -see below    Carb Intake - minimized carbs    Exercise - cardiac rehab , walking     ==========================================  Physical Exam  There were no vitals taken for this visit.  AOx3  No goiter, no carotid bruit  Normal resp effort  No Edema     ==========================================    Laboratory Workup    Lab Results   Component Value Date    WBC 11.55 (H) 05/27/2021    HGB 13.2 05/27/2021    HCT 44.9 05/27/2021    MCV 77.5 (L) 05/27/2021     05/27/2021       Lab Results   Component Value Date    BUN 13 06/15/2018    CREATININE 0.8 06/15/2018    K 4.5 06/15/2018    CO2 30 06/15/2018    CALCIUM 9.2 06/15/2018           ==========================================      ICD-10-CM ICD-9-CM   1. Diabetes mellitus type 2, uncontrolled, with complications (CMS/[facility])  E11.8 250.92    E11.65    2. Hypertension associated with diabetes (CMS/[facility])  E11.59 250.80    I15.2 401.9   3. Hypercholesterolemia  E78.00 272.0       Diabetes Mellitus    --------------------------------------------------------------------------------------------------------------------------------------------    Glycemic Management   Lab Results   Component Value Date    HGBA1C 8.00 04/24/2019       Kervin , not available, pt states she uses to guide dosing         Metformin   Janumet  GLP 1 Therapy    Intolerant    SGLT2 inhibitors  Side effects    Insulin   U500 used to take 150 bid, now 85 bid that self adjusts based on marianne  Add occasional for lunch and bedtime if more than 250 !        Glucagon  rx gvoke     ==========================================================================    Lipids  No results found for: CHOL, CHLPL, TRIG, HDL, LDL, LDLDIRECT    Statin  pravachol   Fibrate    Vascepa    ==========================================================================    Blood Pressure  There were no vitals taken for this visit.      ==========================================================================    Bone Health    Vit D    No results found for: [id number]    Calcium intake     ==========================================================================    Thyroid Assessment  No results found for: TSH        ==========================================================================    Vitamin B12  No results found for: [id number]      ==========================================================================               No orders of the defined types were placed in this encounter.               This document has been electronically signed by Darek Pascal MD on June 9, 2021 17:47 CDT

## 2021-08-16 RX ORDER — PRAVASTATIN SODIUM 20 MG
TABLET ORAL
Qty: 90 TABLET | Refills: 0 | Status: SHIPPED | OUTPATIENT
Start: 2021-08-16 | End: 2021-11-12

## 2021-10-19 ENCOUNTER — DOCUMENTATION (OUTPATIENT)
Dept: ENDOCRINOLOGY | Facility: CLINIC | Age: 71
End: 2021-10-19

## 2021-10-21 ENCOUNTER — TELEPHONE (OUTPATIENT)
Dept: ENDOCRINOLOGY | Facility: CLINIC | Age: 71
End: 2021-10-21

## 2021-10-21 NOTE — TELEPHONE ENCOUNTER
Susanne from [facility] called wanting most recent office notes faxed to [phone]. I saw where you faxed them on 10/19/21 but Susanne stated she did not received them. Could you refax?

## 2021-10-22 ENCOUNTER — DOCUMENTATION (OUTPATIENT)
Dept: ENDOCRINOLOGY | Facility: CLINIC | Age: 71
End: 2021-10-22

## 2021-11-08 NOTE — PROGRESS NOTES
MEDICAL ONCOLOGY PROGRESS NOTE      Charity Molina   1950  11/9/2021     Chief Complaint   Patient presents with    Follow-up     Iron deficiency anemia, unspecified iron deficiency anemia type        INTERVAL HISTORY/HISTORY OF PRESENT ILLNESS:  Charity Molina  The patient is a pleasant 73years old female who has been diagnosed with iron deficiency anemia. She was initially seen by me in January 2017. She does not tolerate oral iron supplementation. She has received IV iron therapy in the past on different occasions. She has had full GI work-up. Past history of Harris's esophagus. This is followed by Dr. Moshe Leon in [facility]. She had recent carotid surgery at [facility].  She feels tired after hospitalization. She had a CBC performed today. She had several scans at [facility] to include a CT of the neck CT abdomen pelvis and chest x-ray. She had a CT lung cancer screening October 2020 and is due for a repeat CT lung cancer screening this year. HEMATOLOGY HISTORY: Iron deficiency anemia  Leilani Garcia was seen in initial hematology consultation on 4/03/6976 referred by Darylene Riddles APRN for evaluation of iron deficiency anemia. Labs from 12/16/2015 revealed a WBC of 8.2 with normal differential, Hgb of 12.1 with an MCV of 79.2 and PLT of 347,000. Serum iron was low at 26 and thyroid functionTSH was normal at 0.6. CMP was fairly normal and B12 was 578. Due to the microcytic indices she was started on oral iron replacement therapy, though poorly tolerated. Leilani Garcia has a history of Harris's esophagus, followed by Dr. Christiana Butcher. Moshe Leon. Endoscopy on 1/26/16 was consistent with Harris's esophagus, hiatal hernia and distal esophageal stricture with dilatation. Due to oral iron intolerance and increase in reflux symptoms with its use, parenteral replacements were suggested by Dr. Moshe Leon. Leilani Garcia was thus referred for potential parenteral iron therapy.    Her initial CBC in this office (02/18/16) revealed use: No    Sexual activity: Not Currently   Other Topics Concern    None   Social History Narrative    None     Social Determinants of Health     Financial Resource Strain:     Difficulty of Paying Living Expenses: Not on file   Food Insecurity:     Worried About Running Out of Food in the Last Year: Not on file    [place] of Food in the Last Year: Not on file   Transportation Needs:     Lack of Transportation (Medical): Not on file    Lack of Transportation (Non-Medical):  Not on file   Physical Activity:     Days of Exercise per Week: Not on file    Minutes of Exercise per Session: Not on file   Stress:     Feeling of Stress : Not on file   Social Connections:     Frequency of Communication with Friends and Family: Not on file    Frequency of Social Gatherings with Friends and Family: Not on file    Attends Qu[employer] Services: Not on file    Active Member of [address] [place] [address] [employer] or Organizations: Not on file    Attends Club or Organization Meetings: Not on file    Marital Status: Not on file   Intimate Partner Violence:     Fear of Current or Ex-Partner: Not on file    Emotionally Abused: Not on file    Physically Abused: Not on file    Sexually Abused: Not on file   Housing Stability:     Unable to Pay for Housing in the Last Year: Not on file    Number of [place] in the Last Year: Not on file    Unstable Housing in the Last Year: Not on file       FAMILY HISTORY:  Family History   Problem Relation Age of Onset    High Blood Pressure Mother     Heart Disease Father     High Cholesterol Sister     Cancer Sister     Diabetes Sister         Current Outpatient Medications   Medication Sig Dispense Refill    lisinopril (PRINIVIL;ZESTRIL) 20 MG tablet Take 20 mg by mouth daily      tiotropium (SPIRIVA RESPIMAT) 2.5 MCG/ACT AERS inhaler Inhale 2 puffs into the lungs daily      MAGNESIUM OXIDE 400 PO Take 1 tablet by mouth 2 times daily      clopidogrel (PLAVIX) 75 MG tablet Take 75 mg by mouth daily      digoxin (LANOXIN) 125 MCG tablet Take 125 mcg by mouth daily      dilTIAZem (CARDIZEM CD) 180 MG extended release capsule       Calcium Carbonate-Vitamin D (OYSTER SHELL CALCIUM/D) 250-125 MG-UNIT TABS Take 1 tablet by mouth once a week      DULoxetine (CYMBALTA) 60 MG extended release capsule       metoprolol succinate (TOPROL XL) 25 MG extended release tablet       nitroGLYCERIN (NITROSTAT) 0.4 MG SL tablet Place 0.4 mg under the tongue as needed      pravastatin (PRAVACHOL) 20 MG tablet Take 20 mg by mouth nightly      SITagliptin-metFORMIN HCl ER (JANUMET XR) 100-1000 MG TB24 Take 1 tablet by mouth daily      warfarin (COUMADIN) 5 MG tablet Take 5 mg by mouth See Admin Instructions 5mg M/W/F, 4mg all other days      potassium chloride (KLOR-CON M) 10 MEQ extended release tablet Take 10 mEq by mouth daily       allopurinol (ZYLOPRIM) 100 MG tablet Take 100 mg by mouth daily      HYDROcodone-acetaminophen (NORCO) 7.5-325 MG per tablet Take 7.5-500 tablets by mouth 3 times daily as needed.  empagliflozin (JARDIANCE) 10 MG tablet Take 1 tablet by mouth daily      insulin regular human (HUMULIN R U-500 KWIKPEN) 500 UNIT/ML SOPN concentrated injection pen Inject 135 Units into the skin 2 times daily      esomeprazole (NEXIUM) 40 MG delayed release capsule Take 40 mg by mouth 2 times daily      furosemide (LASIX) 40 MG tablet Take 80 mg by mouth daily      gabapentin (NEURONTIN) 300 MG capsule Take 300 mg by mouth 3 times daily. Take 2 tabs TID       No current facility-administered medications for this visit.       REVIEW OF SYSTEMS:   CONSTITUTIONAL: no fever, no night sweats,  fatigue;  HEENT: no blurring of vision, no double vision, no hearing difficulty, no tinnitus, no ulceration, no dysplasia, no epistaxis;   LUNGS: no cough, no hemoptysis, no wheeze,  no shortness of breath;  CARDIOVASCULAR: no palpitation, no chest pain, no shortness of breath;  GI: no abdominal pain, no nausea, no vomiting, no diarrhea, no constipation;  MARY GRACE: no dysuria, no hematuria, no frequency or urgency, no nephrolithiasis;  MUSCULOSKELETAL: no joint pain, no swelling, no stiffness;  ENDOCRINE: no polyuria, no polydipsia, no cold or heat intolerance;  HEMATOLOGY: no easy bruising or bleeding, no history of clotting disorder;  DERMATOLOGY: no skin rash, no eczema, no pruritus;  PSYCHIATRY: no depression, no anxiety, no panic attacks, no suicidal ideation, no homicidal ideation;  NEUROLOGY: no syncope, no seizures, no numbness or tingling of hands, no numbness or tingling of feet, no paresis;    Vitals signs:  BP (!) 130/58   Pulse 71   Temp 97.5 °F (36.4 °C) (Oral)   Ht 5' 2.5\" (1.588 m)   Wt 208 lb 9.6 oz (94.6 kg)   LMP 03/01/2013 (Within Months)   SpO2 91%   BMI 37.55 kg/m²    Pain scale:  Pain Score:   7      PHYSICAL EXAM:  CONSTITUTIONAL: Alert, appropriate, no acute distress  EYES: Non icteric, EOM intact, pupils equal round   ENT: Mucus membranes moist, no oral pharyngeal lesions, external inspection of ears and nose are normal.  NECK: Supple, no masses. No palpable thyroid mass  CHEST/LUNGS: CTA bilaterally, normal respiratory effort   CARDIOVASCULAR: RRR, no murmurs. No lower extremity edema  ABDOMEN: soft non-tender, active bowel sounds, no HSM. No palpable masses  EXTREMITIES: warm, full ROM in all 4 extremities, no focal weakness. SKIN: warm, dry with no rashes or lesions  LYMPH: No cervical, clavicular, axillary, or inguinal lymphadenopathy  NEUROLOGIC: follows commands, non focal   PSYCH: mood and affect appropriate. Alert and oriented to time, place, person      Relevant Lab findings/reviewed by me:  10/8/2021 Iron Studies:  Iron 60  TIBC 316  Irons Sat 19  Ferritin 74    11/8/2021 CBC  WBC 12  HGB 15.8    Neut 74      ASSESSMENT    Orders Placed This Encounter   Procedures    Low Dose Chest CT -Abnormal Lung Screen Follow up     Age: 73 y.o.    Smoking History:   Social History Tobacco Use      Smoking status: Current Every Day Smoker        Packs/day: 0.50        Years: 45.00        Pack years: 22.5        Types: Cigarettes      Smokeless tobacco: Never Used    Vaping Use      Vaping Use: Never used    Alcohol use: No      Alcohol/week: 0.0 standard drinks    Drug use: No    Pack years: 22.5  Last CT lung screen: 10/9/2020     Standing Status:   Future     Standing Expiration Date:   11/9/2022     Scheduling Instructions:      Sched at [facility] in      Order Specific Question:   Reason for exam:     Answer:   yearly lung cancer screening      Lyudmila España was seen today for follow-up. Diagnoses and all orders for this visit:    Iron deficiency anemia, unspecified iron deficiency anemia type    Personal history of tobacco use, presenting hazards to [facility]  -     Low Dose Chest CT -Abnormal Lung Screen Follow up; Future    Care plan discussed with patient    Smokes less than 1 pack a day with greater than 40 pack year history  -     Low Dose Chest CT -Abnormal Lung Screen Follow up; Future        Iron deficiency anemia  November 2018colonoscopy was unremarkable  Anemia labs 1/5/2021 showed Iron 30, TIBC 437, UIBC 407, Iron sat 7%, Ferritin 15.   5/3/2021hemoglobin 12.2/MCV 76.9  Poor tolerance to p.o. iron supplementation. Patient still feeling quite fatigued. 5/11/2021s/p IV Injectafer 750 mg x 2 doses  10/8/2021 Iron Studies:  Iron 60  TIBC 316  Irons Sat 19  Ferritin 74  11/8/2021 CBC  WBC 12  HGB 15.8    Neut 74    At this point, no intervention for her anemia. Hemoglobin has improved significantly and normalized. Health Maintenance: The patient is encouraged to follow-up with primary care regularly for further recommendations regarding age appropriated screening for cancer, well-being visit (preventative  measures), follow-up and treatment of other medical comorbidities.   We will order mammogram.       At risk for Lung Cancer-CT low-dose lung cancer screening was unremarkable 10/9/2020. Repeat CT lung cancer screening next available.       A fib. -Continue chronic anticoagulation as per PCP. Warfarin      Smoking cessation- encouraged.      Hypertensionimproved. Continue follow-up with primary care.     Plan:   · RTC with MD 6 months in [place]  · Lung Cancer screening with CT low-dose at [facility] in [place]  · Follow-up with primary care for other medical problems          Follow Up:     Return in about 6 months (around 5/9/2022) for Appointment with Dr. Alvaro Walters in [place]. CT low dose lung screening at [facility] in . Avel Handley, Taqueria Mccoy am scribing for eLsli Arenas MD. Electronically signed by Taqueria Mccoy RN on 11/9/2021 at 11:13 AM CST. I, Dr Padma Newsome, personally performed the services described in this documentation as scribed by Taqueria Mccoy RN in my presence and is both accurate and complete. I have seen, examined and reviewed this patient medication list, appropriate labs and imaging studies. I reviewed relevant medical records and others physicians notes. I discussed the plans of care with the patient. I answered all the questions to the patients satisfaction. I have also reviewed the chief complaint (CC) and part of the history (History of Present Illness (HPI), Past Family Social History [facility]), or Review of Systems (ROS) and made changes when appropriated.        (Please note that portions of this note were completed with a voice recognition program. Efforts were made to edit the dictations but occasionally words are mis-transcribed.)    Electronically signed by Lesli Arenas MD on 11/9/2021 at 1:34 PM .

## 2021-11-09 ENCOUNTER — OFFICE VISIT (OUTPATIENT)
Dept: HEMATOLOGY | Age: 71
End: 2021-11-09
Payer: MEDICARE

## 2021-11-09 VITALS
DIASTOLIC BLOOD PRESSURE: 58 MMHG | TEMPERATURE: 97.5 F | SYSTOLIC BLOOD PRESSURE: 130 MMHG | HEART RATE: 71 BPM | BODY MASS INDEX: 36.96 KG/M2 | WEIGHT: 208.6 LBS | HEIGHT: 63 IN | OXYGEN SATURATION: 91 %

## 2021-11-09 DIAGNOSIS — D50.9 IRON DEFICIENCY ANEMIA, UNSPECIFIED IRON DEFICIENCY ANEMIA TYPE: Primary | ICD-10-CM

## 2021-11-09 DIAGNOSIS — F17.210 SMOKES LESS THAN 1 PACK A DAY WITH GREATER THAN 40 PACK YEAR HISTORY: ICD-10-CM

## 2021-11-09 DIAGNOSIS — Z87.891 PERSONAL HISTORY OF TOBACCO USE, PRESENTING HAZARDS TO HEALTH: ICD-10-CM

## 2021-11-09 DIAGNOSIS — Z71.89 CARE PLAN DISCUSSED WITH PATIENT: ICD-10-CM

## 2021-11-09 PROCEDURE — 3017F COLORECTAL CA SCREEN DOC REV: CPT | Performed by: INTERNAL MEDICINE

## 2021-11-09 PROCEDURE — G8417 CALC BMI ABV UP PARAM F/U: HCPCS | Performed by: INTERNAL MEDICINE

## 2021-11-09 PROCEDURE — 4004F PT TOBACCO SCREEN RCVD TLK: CPT | Performed by: INTERNAL MEDICINE

## 2021-11-09 PROCEDURE — G8427 DOCREV CUR MEDS BY ELIG CLIN: HCPCS | Performed by: INTERNAL MEDICINE

## 2021-11-09 PROCEDURE — 1123F ACP DISCUSS/DSCN MKR DOCD: CPT | Performed by: INTERNAL MEDICINE

## 2021-11-09 PROCEDURE — 4040F PNEUMOC VAC/ADMIN/RCVD: CPT | Performed by: INTERNAL MEDICINE

## 2021-11-09 PROCEDURE — G8400 PT W/DXA NO RESULTS DOC: HCPCS | Performed by: INTERNAL MEDICINE

## 2021-11-09 PROCEDURE — G8484 FLU IMMUNIZE NO ADMIN: HCPCS | Performed by: INTERNAL MEDICINE

## 2021-11-09 PROCEDURE — 99213 OFFICE O/P EST LOW 20 MIN: CPT | Performed by: INTERNAL MEDICINE

## 2021-11-09 PROCEDURE — 1090F PRES/ABSN URINE INCON ASSESS: CPT | Performed by: INTERNAL MEDICINE

## 2021-11-09 RX ORDER — LISINOPRIL 20 MG/1
20 TABLET ORAL DAILY
COMMUNITY
Start: 2021-11-06 | End: 2022-11-07

## 2021-11-12 RX ORDER — PRAVASTATIN SODIUM 20 MG
TABLET ORAL
Qty: 90 TABLET | Refills: 0 | Status: SHIPPED | OUTPATIENT
Start: 2021-11-12 | End: 2022-02-07

## 2021-12-14 ENCOUNTER — TELEPHONE (OUTPATIENT)
Dept: GASTROENTEROLOGY | Facility: CLINIC | Age: 71
End: 2021-12-14

## 2022-02-07 RX ORDER — PRAVASTATIN SODIUM 20 MG
TABLET ORAL
Qty: 90 TABLET | Refills: 0 | Status: SHIPPED | OUTPATIENT
Start: 2022-02-07 | End: 2022-05-06

## 2022-04-07 RX ORDER — INSULIN HUMAN 500 [IU]/ML
INJECTION, SOLUTION SUBCUTANEOUS
Qty: 12 ML | Refills: 0 | Status: SHIPPED | OUTPATIENT
Start: 2022-04-07 | End: 2022-05-06

## 2022-04-07 RX ORDER — SITAGLIPTIN AND METFORMIN HYDROCHLORIDE 1000; 100 MG/1; MG/1
TABLET, FILM COATED, EXTENDED RELEASE ORAL
Qty: 30 TABLET | Refills: 0 | Status: SHIPPED | OUTPATIENT
Start: 2022-04-07 | End: 2022-05-06

## 2022-04-14 ENCOUNTER — TELEPHONE (OUTPATIENT)
Dept: ENDOCRINOLOGY | Facility: CLINIC | Age: 72
End: 2022-04-14

## 2022-04-14 DIAGNOSIS — E08.42 DIABETIC POLYNEUROPATHY ASSOCIATED WITH DIABETES MELLITUS DUE TO UNDERLYING CONDITION: ICD-10-CM

## 2022-04-14 NOTE — TELEPHONE ENCOUNTER
Pt needs refills on Jardiance 10 MG tab sent to [facility] in [facility]. PT HAS 3 DAYS OF MED LEFT.       THANKS

## 2022-05-06 RX ORDER — PRAVASTATIN SODIUM 20 MG
TABLET ORAL
Qty: 90 TABLET | Refills: 0 | Status: SHIPPED | OUTPATIENT
Start: 2022-05-06 | End: 2022-08-01

## 2022-05-06 RX ORDER — SITAGLIPTIN AND METFORMIN HYDROCHLORIDE 1000; 100 MG/1; MG/1
TABLET, FILM COATED, EXTENDED RELEASE ORAL
Qty: 30 TABLET | Refills: 0 | Status: SHIPPED | OUTPATIENT
Start: 2022-05-06 | End: 2022-06-06

## 2022-05-06 RX ORDER — PEN NEEDLE, DIABETIC 31 GX5/16"
NEEDLE, DISPOSABLE MISCELLANEOUS
Qty: 360 EACH | Refills: 0 | Status: SHIPPED | OUTPATIENT
Start: 2022-05-06 | End: 2022-08-01

## 2022-05-06 RX ORDER — INSULIN HUMAN 500 [IU]/ML
INJECTION, SOLUTION SUBCUTANEOUS
Qty: 12 ML | Refills: 0 | Status: SHIPPED | OUTPATIENT
Start: 2022-05-06 | End: 2022-06-06

## 2022-06-06 RX ORDER — SITAGLIPTIN AND METFORMIN HYDROCHLORIDE 1000; 100 MG/1; MG/1
TABLET, FILM COATED, EXTENDED RELEASE ORAL
Qty: 30 TABLET | Refills: 0 | Status: SHIPPED | OUTPATIENT
Start: 2022-06-06 | End: 2022-06-29

## 2022-06-06 RX ORDER — INSULIN HUMAN 500 [IU]/ML
INJECTION, SOLUTION SUBCUTANEOUS
Qty: 12 ML | Refills: 0 | Status: SHIPPED | OUTPATIENT
Start: 2022-06-06 | End: 2022-07-06

## 2022-06-29 ENCOUNTER — OFFICE VISIT (OUTPATIENT)
Dept: ENDOCRINOLOGY | Facility: CLINIC | Age: 72
End: 2022-06-29

## 2022-06-29 ENCOUNTER — SPECIALTY PHARMACY (OUTPATIENT)
Dept: ENDOCRINOLOGY | Facility: CLINIC | Age: 72
End: 2022-06-29

## 2022-06-29 VITALS
DIASTOLIC BLOOD PRESSURE: 70 MMHG | OXYGEN SATURATION: 94 % | SYSTOLIC BLOOD PRESSURE: 110 MMHG | HEART RATE: 89 BPM | BODY MASS INDEX: 38.83 KG/M2 | WEIGHT: 211 LBS | HEIGHT: 62 IN

## 2022-06-29 DIAGNOSIS — E78.00 HYPERCHOLESTEROLEMIA: ICD-10-CM

## 2022-06-29 DIAGNOSIS — L84 PRE-ULCERATIVE CALLUSES: ICD-10-CM

## 2022-06-29 DIAGNOSIS — Z79.4 TYPE 2 DIABETES MELLITUS WITH HYPERGLYCEMIA, WITH LONG-TERM CURRENT USE OF INSULIN: Primary | ICD-10-CM

## 2022-06-29 DIAGNOSIS — I15.2 HYPERTENSION ASSOCIATED WITH DIABETES: ICD-10-CM

## 2022-06-29 DIAGNOSIS — E11.65 TYPE 2 DIABETES MELLITUS WITH HYPERGLYCEMIA, WITH LONG-TERM CURRENT USE OF INSULIN: Primary | ICD-10-CM

## 2022-06-29 DIAGNOSIS — E11.59 HYPERTENSION ASSOCIATED WITH DIABETES: ICD-10-CM

## 2022-06-29 PROCEDURE — 95251 CONT GLUC MNTR ANALYSIS I&R: CPT | Performed by: INTERNAL MEDICINE

## 2022-06-29 PROCEDURE — 99214 OFFICE O/P EST MOD 30 MIN: CPT | Performed by: INTERNAL MEDICINE

## 2022-06-29 RX ORDER — METOPROLOL SUCCINATE 25 MG/1
25 TABLET, EXTENDED RELEASE ORAL DAILY
COMMUNITY

## 2022-06-29 RX ORDER — EMPAGLIFLOZIN, METFORMIN HYDROCHLORIDE 10; 1000 MG/1; MG/1
1 TABLET, EXTENDED RELEASE ORAL
Qty: 30 TABLET | Refills: 11 | Status: SHIPPED | OUTPATIENT
Start: 2022-06-29 | End: 2022-06-29 | Stop reason: SDUPTHER

## 2022-06-29 RX ORDER — EMPAGLIFLOZIN, METFORMIN HYDROCHLORIDE 10; 1000 MG/1; MG/1
1 TABLET, EXTENDED RELEASE ORAL
Qty: 30 TABLET | Refills: 11 | Status: SHIPPED | OUTPATIENT
Start: 2022-06-29 | End: 2023-02-02 | Stop reason: SDUPTHER

## 2022-06-29 RX ORDER — SEMAGLUTIDE 1.34 MG/ML
INJECTION, SOLUTION SUBCUTANEOUS WEEKLY
Qty: 1.5 ML | Refills: 11 | Status: SHIPPED | OUTPATIENT
Start: 2022-06-29 | End: 2023-02-02 | Stop reason: SDUPTHER

## 2022-06-29 RX ORDER — DILTIAZEM HYDROCHLORIDE 180 MG/1
180 CAPSULE, COATED, EXTENDED RELEASE ORAL DAILY
COMMUNITY

## 2022-06-29 RX ORDER — SEMAGLUTIDE 1.34 MG/ML
0.25 INJECTION, SOLUTION SUBCUTANEOUS WEEKLY
Qty: 1 PEN | Refills: 11 | Status: SHIPPED | OUTPATIENT
Start: 2022-06-29 | End: 2022-06-29 | Stop reason: SDUPTHER

## 2022-06-29 NOTE — PROGRESS NOTES
Specialty Pharmacy Program - Endocrinology       Milagros Nance is a 71 y.o. female with Type 2 Diabetes enrolled in the Endocrinology Patient Management program offered by [facility].  An initial outreach was conducted, including assessment of therapy appropriateness and specialty medication education for Synjardy and Ozempic. The patient was introduced to services offered by our specialty pharmacy program, including: regular assessments, refill coordination, curbside pick-up or mail order delivery options, prior authorization maintenance, and financial assistance programs as applicable. The patient was also provided with contact information for the pharmacy team.     Insurance Coverage & Financial Support  Medicare     Relevant Past Medical History and Comorbidities  Relevant medical history and concomitant health conditions were discussed with the patient. The patient's chart has been reviewed for relevant past medical history and comorbid health conditions and updated as necessary.   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   • Afib (HCC)    • Anxiety    • Jackson's esophagus    • Blood in stool    • Depression    • Diabetes mellitus (HCC)    • Diarrhea    • History of adenomatous polyp of colon    • History of colon polyps    • Hypertension    • Neuropathy    • Osteoarthritis    • Swelling abdomen      Social History     Socioeconomic History   • Marital status: Single   Tobacco Use   • Smoking status: Former Smoker   • Smokeless tobacco: Never Used   Substance and Sexual Activity   • Alcohol use: No   • Drug use: No   • Sexual activity: Defer       Problem list reviewed by Og Weber, PharmD on 6/29/2022 at  1:47 PM    Allergies  Known allergies and reactions were discussed with the patient. The patient's chart has been reviewed for  allergy information and updated as necessary.   Avelox [moxifloxacin] and  Moxifloxacin hcl in nacl    Allergies reviewed by Og Weber, PharmD on 6/29/2022 at  1:47 PM    Current Medication List  This medication list has been reviewed with the patient and evaluated for any interactions or necessary modifications/recommendations, and updated to include all prescription medications, OTC medications, and supplements the patient is currently taking.  This list reflects what is contained in the patient's profile, which has also been marked as reviewed to communicate to other providers it is the most up to date version of the patient's current medication therapy.     Current Outpatient Medications:   •  Empagliflozin-metFORMIN HCl ER (Synjardy XR)  MG tablet sustained-release 24 hour, Take 1 tablet/day by mouth Daily With Breakfast., Disp: 30 tablet, Rfl: 11  •  Semaglutide,0.25 or 0.5MG/DOS, (Ozempic, 0.25 or 0.5 MG/DOSE,) 2 MG/1.5ML solution pen-injector, Inject 0.25 mg under the skin into the appropriate area as directed 1 (One) Time Per Week for 4 weeks then inject 0.5 mg under the skin 1 time per week., Disp: 1 pen, Rfl: 11  •  albuterol (PROVENTIL HFA;VENTOLIN HFA) 108 (90 BASE) MCG/ACT inhaler, Inhale  into the lungs as needed.  , Disp: , Rfl:   •  allopurinol (ZYLOPRIM) 100 MG tablet, Take 100 mg by mouth Daily., Disp: , Rfl:   •  apixaban (ELIQUIS) 5 MG tablet tablet, Take 5 mg by mouth 2 (Two) Times a Day., Disp: , Rfl:   •  beclomethasone (QVAR) 80 MCG/ACT inhaler, Inhale 1 puff into the lungs 2 times daily, Disp: , Rfl:   •  digoxin (LANOXIN) 250 MCG tablet, Take 250 mcg by mouth Daily., Disp: , Rfl:   •  dilTIAZem CD (CARDIZEM CD) 180 MG 24 hr capsule, Take 180 mg by mouth Daily., Disp: , Rfl:   •  DULoxetine (CYMBALTA) 60 MG capsule, Take 60 mg by mouth daily., Disp: , Rfl:   •  esomeprazole (nexIUM) 40 MG capsule, , Disp: , Rfl:   •  furosemide (LASIX) 40 MG tablet, Take 40 mg by mouth daily., Disp: , Rfl:   •  gabapentin (Neurontin) 300 MG capsule, Take 3 capsules  by mouth 3 (Three) Times a Day., Disp: 270 capsule, Rfl: 5  •  HumuLIN R U-500 KwikPen 500 UNIT/ML solution pen-injector CONCENTRATED injection, Inject 135 units SUB-Q with Breakfast and 95 units with supper **26 DAY SUPPLY**, Disp: 12 mL, Rfl: 0  •  HYDROcodone-acetaminophen (VICODIN) 7.5-500 MG per tablet, Take 7.5-500 tablets by mouth every 6 (six) hours., Disp: , Rfl:   •  LORazepam (ATIVAN) 1 MG tablet, Take 1 mg by mouth Every 8 (Eight) Hours As Needed for Anxiety., Disp: , Rfl:   •  Magnesium 400 MG capsule, Take  by mouth 2 (Two) Times a Day., Disp: , Rfl:   •  metoprolol succinate XL (TOPROL-XL) 25 MG 24 hr tablet, Take 25 mg by mouth Daily., Disp: , Rfl:   •  potassium chloride 10 MEQ CR tablet, Take 10 mEq by mouth 2 (two) times a day., Disp: , Rfl:   •  pravastatin (PRAVACHOL) 20 MG tablet, TAKE ONE TABLET BY MOUTH ONCE DAILY IN THE EVENING, Disp: 90 tablet, Rfl: 0  •  Probiotic Product (PROBIOTIC-10 PO), Take  by mouth 2 (Two) Times a Day., Disp: , Rfl:   •  tiotropium (SPIRIVA) 18 MCG per inhalation capsule, Place 1 capsule into inhaler and inhale Daily., Disp: , Rfl:   •  Unifine Pentips 31G X 8 MM misc, USE TO INJECT INSULIN 4 TIMES A DAY, Disp: 360 each, Rfl: 0  •  vitamin D (ERGOCALCIFEROL) 40457 units capsule capsule, Take 50,000 Units by mouth 1 (One) Time Per Week., Disp: , Rfl:     Medicines reviewed by Og Weber, PharmD on 6/29/2022 at  1:46 PM    Drug Interactions  none     Recommended Medications Assessment  • Statin - Taking  • ACEi/ARB - Indicated, not currently taking    Relevant Laboratory Values  A1C Last 3 Results    HGBA1C Last 3 Results 12/30/21 5/18/22 5/22/22   Hemoglobin A1C 7.3 (A) 8.4 (A) 8.7 (A)   (A) Abnormal value            Lab Results   Component Value Date    HGBA1C 8.7 (H) 05/22/2022     Lab Results   Component Value Date    CALCIUM 9.2 06/15/2018     06/15/2018    K 4.0 11/02/2021    CO2 30 06/15/2018    CL 96 (L) 06/15/2018    BUN 13 06/15/2018     CREATININE 0.8 06/15/2018    ANIONGAP 15 (H) 06/15/2018     Lab Results   Component Value Date    CHLPL 176 12/30/2021    TRIG 261 (H) 12/30/2021    HDL 40 12/30/2021    LDL 84 12/30/2021         Initial Education Provided for Specialty Medication  The patient has been provided with the following education and any applicable administration techniques (i.e. self-injection) have been demonstrated for the therapies indicated. All questions and concerns have been addressed prior to the patient receiving the medication, and the patient has verbalized understanding of the education and any materials provided.  Additional patient education shall be provided and documented upon request by the patient, provider or payer.      OZEMPIC® (semaglutide)  Medication Expectations   Why am I taking this medication? You are taking Ozempic, along with diet and exercise, to lower blood sugar because you have type 2 diabetes. Diabetes is not curable but with proper medication and treatment, we can keep your blood sugar within your personalized target range. This medication may also help you lose some weight, and it helps reduce the risk of death from heart attack, and stroke in adults with type 2 diabetes and known heart disease.   What should I expect while on this medication? You should expect to see your blood sugar and A1c decrease over time and you may also lose some weight.   How does the medication work? Ozempic is a non-insulin injection that works with your body's own ability to lower blood sugar and A1c and helps your body release its own insulin in response to your blood sugar rising.  This medication also slows down food from leaving your stomach, making you feel perez for longer.   How long will I be on this medication for? The amount of time you will be on this medication will be determined by your doctor based on blood sugar and A1c control. You will most likely be on this medication or another diabetes medication  throughout your lifetime. Do not abruptly stop this medication without talking to your doctor first.    How do I take this medication? Take as directed on your prescription label. Ozempic is injected under the skin (subcutaneously) of your stomach, thigh or upper arm.  Use this medication once weekly, on the same day each week, and it can be given with or without food.  Use a different injection site each week in the same body region.     For each new prefilled pen, prime the needle before the first injection by turning the dose selector to the flow check symbol and injecting into the air (priming is not required for subsequent injections).   • Once needle is inserted, continue to press the button until the dial has returned to 0 and for an additional 6 seconds before removing.  •   What are some possible side effects? You may notice you don't feel as hungry, especially when you first start using Ozempic.  The most common side effects are nausea, diarrhea, vomiting, stomach pain, and constipation.      Stop using Ozempic and call your doctor immediately if you have severe pain in your stomach area that will not go away as this could be a sign of pancreatitis (inflammation of your pancreas).  Tell your doctor if you get a lump or swelling in your neck, hoarseness, difficulty swallowing, or feel short of breath (these may be symptoms of thyroid cancer).  Talk with your doctor if you have changes in vision while taking Ozempic.   What happens if I miss a dose? If you miss a dose, take it as soon as you remember as long as it is within 5 days after your missed dose.  If more than 5 days have passed, skip the missed dose and resume Ozempic on the regularly scheduled day.     Medication Safety   What are things I should warn my doctor immediately about? Do not use Ozempic if you or a family member have ever had medullary thyroid cancer (MTC) or Multiple Endocrine Neoplasia syndrome type 2 (MEN 2).    • Tell your doctor if  you get a lump or swelling in your neck, hoarseness, difficulty swallowing, or feel short of breath (these may be symptoms of thyroid cancer).    Tell your doctor if you have or have had problems with your kidneys or pancreas.   • Stop using Ozempic and get medical help right away if you have severe pain in your stomach area that will not go away as this could be a sign of pancreatitis (inflammation of your pancreas)  Tell your doctor if you have problem digesting food or slowed emptying of your stomach (gastroparesis).    Let your doctor know if you have changes in vision while taking Ozempic or have been diagnosed with diabetic retinopathy.    Talk to your doctor if you are pregnant, planning to become pregnant, or breastfeeding.     Get medical help right away if you notice any signs/symptoms of an allergic reaction (rash, hives, difficulty breathing, etc.).   What are things that I should be cautious of? Be cautious of any side effects from this medication. Talk to your doctor if any new ones develop or aren't getting better.   What are some medications that can interact with this one? Taking Ozempic with other medications that also lower your blood sugar such as insulin and glipizide/glimepiride/glyburide may increase the risk of low blood sugar.  • Your doctor may reduce the dose of these medications when you start Ozempic to minimize low blood sugars    It should not be taken with other medicines called GLP-1 receptor agonists, because these work the same way as Ozempic.      Because Ozempic slows stomach emptying, it can affect the way some medicines work.    Always tell your doctor or pharmacist immediately if you start taking any new medications, including over-the-counter medications, vitamins, and herbal supplements.     Medication Storage/Handling   How should I handle this medication? Keep this medication out of reach of pets/children and keep the pen capped when not in use.   How does this medication  need to be stored? Store in the refrigerator prior to first use, but do not freeze.  After first use, you may continue to store in the refrigerator or at room temperature for 56 days.  Protect from excessive heat and sunlight.   How should I dispose of this medication? Used Ozempic pens should be thrown away after 56 days.  Place your used Ozempic pen and needle in an approved sharps container after use.  If you do not have a sharps container, you may use a household container made of heavy-duty plastic with a tight-fitting lid that is leak resistant (e.g., heavy-duty plastic laundry detergent bottle).    If your doctor decides to stop this medication, take to your local police station for proper disposal. Some pharmacies also have take-back bins for medication drop-off.      Resources/Support   How can I remind myself to take this medication? You can download reminder apps to help you manage your refills. You may also set an alarm on your phone to remind you.    Is financial support available?  [employer] can provide co-pay cards if you have commercial insurance or patient assistance if you have Medicare or no insurance.    Which vaccines are recommended for me? Talk to your doctor about these vaccines:  • Flu   • Coronavirus (COVID-19)   • Pneumococcal (pneumonia)   • Tdap   • Hepatitis B   • Zoster (shingles)      SYNJARDY® (empagliflozin + metformin)  Medication Expectations   Why am I taking this medication? You are taking this medication to lower blood sugar because you have type 2 diabetes. Diabetes is not curable but with proper medication and treatment, we can keep your blood sugar within your personalized target range. Jardiance (empagliflozin), one of the medications in Synjardy, can reduce the risk of death from heart attack or stroke if you also have heart disease.    What should I expect while on this medication? You should expect to see your blood sugar and A1c decrease over time. You may also see a  decrease in your blood pressure and it can help some people lose weight.     How does the medication work? Synjardy works by removing some sugar that the body doesn't need through urination, lowering sugar production in the body, reducing the amount of sugar that enters the bloodstream after a meal, and making your body more sensitive to insulin.     How long will I be on this medication for? The amount of time you will be on this medication will be determined by your doctor based on blood sugar and A1c control. You will most likely be on this medication or another diabetes medication throughout your lifetime. Do not abruptly stop this medication without talking to your doctor first.    How do I take this medication? Take as directed on your prescription label, usually once or twice a day. Take with food.     What are some possible side effects? The most common side effects include urinary tract infections, genital yeast infections, increased urination, diarrhea, gas, nausea and vomiting, stomach pain, indigestion, headache, and stuffy or runny nose and sore throat. Talk with your doctor if you notice white or yellow vaginal discharge, vaginal itching or odor of if you notice redness, itching, pain, or swelling of the penis and/or bad-smelling discharge from the penis.    What happens if I miss a dose? If you miss a dose, take it as soon as you remember. If it is close to your next dose, skip it (do not take 2 doses at once)     Medication Safety   What are things I should warn my doctor immediately about? Tell your doctor if you have kidney disease, liver disease, heart failure, pancreas problems, vitamin B-12 deficiency, or history of frequent genital yeast or urinary tract infections. Tell your doctor if you are on a low-salt diet, if you drink alcohol, or if you are having surgery. Talk to your doctor if you are pregnant, planning to become pregnant, or breastfeeding. If you have irregular periods or none at all  but have not gone through menopause, this medication can cause ovulation and increase the chance of getting pregnant. Also tell your doctor if you notice any signs/symptoms of an allergic reaction (rash, hives, difficulty breathing, etc.).   What are things that I should be cautious of? Be cautious of any side effects from this medication. Talk to your doctor or pharmacist if any new ones develop or aren't getting better.   What are some medications that can interact with this one? This medication can interact with the dye used for an x-ray or CT-scan. Some medications that interact include ranolazine, cimetidine, diuretics (water pills), and other medications that may also lower your blood sugar such as insulins and glipizide/glimepiride/glyburide. Your doctor may reduce the dose of other diabetes medications when you start Synjardy to minimize low blood sugars. Always tell your doctor or pharmacist immediately if you start taking any new medications, including over-the-counter medications, vitamins, and herbal supplements.      Medication Storage/Handling   How should I handle this medication? Keep this medication out of reach of pets/children in tightly sealed container   How does this medication need to be stored? Store at room temperature and keep dry (don't keep in bathroom or other room with moisture)   How should I dispose of this medication? There should not be a need to dispose of this medication unless your provider decides to change the dose or therapy. If that is the case, take to your local police station for proper disposal. Some pharmacies also have take-back bins for medication drop-off.      Resources/Support   How can I remind myself to take this medication? You can download reminder apps to help you manage your refills. You may also set an alarm on your phone to remind you. The pharmacy carries pill boxes that you can place next to an area you pass everyday (such as where you place your car keys  or where you charge your phone)   Is financial support available?  [employer] and [employer] can provide co-pay cards if you have commercial insurance or patient assistance if you have Medicare or no insurance.    Which vaccines are recommended for me? Talk to your doctor about these vaccines: Flu, Coronavirus (COVID-19), Pneumococcal (pneumonia), Tdap, Hepatitis B, Zoster (shingles)          Adherence and Self-Administration  • Barriers to Patient Adherence and/or Self-Administration: none   • Methods for Supporting Patient Adherence and/or Self-Administration: none     Reassessment Plan & Follow-Up  1. Medication Therapy Changes: Start Synjardy and Ozempic, Stop janumet and jardiance   2. Additional Plans, Therapy Recommendations, or Therapy Problems to Be Addressed: none   3. Pharmacist to perform regular reassessments no more than (6) months from the previous assessment.  4. Welcome information and patient satisfaction survey to be sent by retail team with patient's initial fill.  5. Care Coordinator to set up future refill outreaches, coordinate prescription delivery, and escalate clinical questions to pharmacist.     Attestation  I attest that the initiated specialty medication(s) are appropriate for the patient based on my assessment.  If the prescribed therapy is at any point deemed not appropriate based on the current or future assessments, a consultation will be initiated with the patient's specialty care provider to determine the best course of action. The revised plan of therapy will be documented along with any additional patient education provided.     Richard Weber, HilarioD, MPH, BCPS    6/29/2022  13:48 CDT

## 2022-07-05 ENCOUNTER — DOCUMENTATION (OUTPATIENT)
Dept: ENDOCRINOLOGY | Facility: CLINIC | Age: 72
End: 2022-07-05

## 2022-07-05 NOTE — PROGRESS NOTES
RX AND CMN FOR OMNIPOD SENT TO [facility] @ [phone]    CONFIRMATION RECEIVED  SENT TO [facility]

## 2022-07-06 RX ORDER — SITAGLIPTIN AND METFORMIN HYDROCHLORIDE 1000; 100 MG/1; MG/1
TABLET, FILM COATED, EXTENDED RELEASE ORAL
Qty: 30 TABLET | Refills: 0 | Status: SHIPPED | OUTPATIENT
Start: 2022-07-06 | End: 2022-10-31

## 2022-07-06 RX ORDER — INSULIN HUMAN 500 [IU]/ML
INJECTION, SOLUTION SUBCUTANEOUS
Qty: 12 ML | Refills: 0 | Status: SHIPPED | OUTPATIENT
Start: 2022-07-06 | End: 2022-07-25

## 2022-07-21 ENCOUNTER — TELEPHONE (OUTPATIENT)
Dept: ENDOCRINOLOGY | Facility: CLINIC | Age: 72
End: 2022-07-21

## 2022-07-21 NOTE — TELEPHONE ENCOUNTER
PT called and is needing RX for Diabetic Shoes sent to:    [facility]  [address] [place] [address] [phone]

## 2022-07-25 ENCOUNTER — TELEPHONE (OUTPATIENT)
Dept: ENDOCRINOLOGY | Facility: CLINIC | Age: 72
End: 2022-07-25

## 2022-07-25 RX ORDER — INSULIN HUMAN 500 [IU]/ML
INJECTION, SOLUTION SUBCUTANEOUS
Qty: 40 ML | Refills: 11 | Status: SHIPPED | OUTPATIENT
Start: 2022-07-25 | End: 2023-02-02 | Stop reason: SDUPTHER

## 2022-07-26 ENCOUNTER — DOCUMENTATION (OUTPATIENT)
Dept: ENDOCRINOLOGY | Facility: CLINIC | Age: 72
End: 2022-07-26

## 2022-07-26 NOTE — PROGRESS NOTES
MEDICARE PART D PA SUBMITTED FOR OMNIPOD 5 INTRO KIT WITH CHART NOTES FAXED [phone]    SENT TO [facility]

## 2022-08-01 RX ORDER — PEN NEEDLE, DIABETIC 31 GX5/16"
NEEDLE, DISPOSABLE MISCELLANEOUS
Qty: 360 EACH | Refills: 0 | Status: SHIPPED | OUTPATIENT
Start: 2022-08-01 | End: 2022-11-01

## 2022-08-01 RX ORDER — PRAVASTATIN SODIUM 20 MG
TABLET ORAL
Qty: 90 TABLET | Refills: 0 | Status: SHIPPED | OUTPATIENT
Start: 2022-08-01 | End: 2022-11-01

## 2022-08-04 ENCOUNTER — DOCUMENTATION (OUTPATIENT)
Dept: ENDOCRINOLOGY | Facility: CLINIC | Age: 72
End: 2022-08-04

## 2022-08-04 NOTE — PROGRESS NOTES
CHART NOTES FAXED TO [facility] @ [phone]    CONFIRMATION RECEIVED  SENT TO MED REC

## 2022-08-08 ENCOUNTER — DOCUMENTATION (OUTPATIENT)
Dept: ENDOCRINOLOGY | Facility: CLINIC | Age: 72
End: 2022-08-08

## 2022-08-11 ENCOUNTER — SPECIALTY PHARMACY (OUTPATIENT)
Dept: PHARMACY | Facility: HOSPITAL | Age: 72
End: 2022-08-11

## 2022-08-11 NOTE — PROGRESS NOTES
Specialty Pharmacy Refill Coordination Note     Milagros is a 71 y.o. female contacted today regarding refills of  ozempic  specialty medication(s).    Reviewed and verified with patient:       Specialty medication(s) and dose(s) confirmed: yes    Refill Questions    Flowsheet Row Most Recent Value   Changes to allergies? No   Changes to medications? No   New conditions since last clinic visit No   Unplanned office visit, urgent care, ED, or hospital admission in the last 4 weeks  No   How does patient/caregiver feel medication is working? Good   Financial problems or insurance changes  No   Since the previous refill, were any specialty medication doses or scheduled injections missed or delayed?  No   Does this patient require a clinical escalation to a pharmacist? No          Delivery Questions    Flowsheet Row Most Recent Value   Delivery method FedEx   Delivery address correct? Yes   Delivery phone number [phone]   Number of medications in delivery 1   Medication being filled and delivered ozempic   Is there any medication that is due not being filled? No   Supplies needed? No supplies needed   Cooler needed? Yes   Do any medications need mixed or dated? No   Questions or concerns for the pharmacist? No   Are any medications first time fills? No                 Follow-up: 42 days      Faviola Morales, Pharmacy Technician  Specialty Pharmacy Technician

## 2022-08-23 ENCOUNTER — DOCUMENTATION (OUTPATIENT)
Dept: ENDOCRINOLOGY | Facility: CLINIC | Age: 72
End: 2022-08-23

## 2022-08-23 NOTE — PROGRESS NOTES
PWO FOR DIABETIC SHOES FAXED WITH CHART NOTES TO [facility] @ [phone]    CONFIRMATION RECEIVED  SENT TO MED REC

## 2022-08-25 DIAGNOSIS — D50.9 IRON DEFICIENCY ANEMIA, UNSPECIFIED IRON DEFICIENCY ANEMIA TYPE: Primary | ICD-10-CM

## 2022-08-26 NOTE — PROGRESS NOTES
MEDICAL ONCOLOGY PROGRESS NOTE      Paz Tabor   1950  8/30/2022     Chief Complaint   Patient presents with    Follow-up     Iron deficiency anemia, unspecified iron deficiency anemia type          INTERVAL HISTORY/HISTORY OF PRESENT ILLNESS:  Paz Tabor  The patient is a pleasant 73years old female who has been diagnosed with iron deficiency anemia. She was initially seen by me in January 2017. She does not tolerate oral iron supplementation. She has received IV iron therapy in the past on different occasions. She has had full GI work-up. Past history of Harris's esophagus. This is followed by Dr. Lanny Fuentes in [place]. She had recent carotid surgery at [facility].  She feels tired after hospitalization. She had a CBC performed today. She had several scans at [facility] to include a CT of the neck CT abdomen pelvis and chest x-ray. She had a CT lung cancer screening October 2020 and is due for a repeat CT lung cancer screening this year. HEMATOLOGY HISTORY: Iron deficiency anemia  Messi Segura was seen in initial hematology consultation on 9/11/2059 referred by Karsten ELIZABETH for evaluation of iron deficiency anemia. Labs from 12/16/2015 revealed a WBC of 8.2 with normal differential, Hgb of 12.1 with an MCV of 79.2 and PLT of 347,000. Serum iron was low at 26 and thyroid function-TSH was normal at 0.6. CMP was fairly normal and B12 was 578. Due to the microcytic indices she was started on oral iron replacement therapy, though poorly tolerated. [facility] has a history of Harris's esophagus, followed by Dr. Fabricio Fuentes. Endoscopy on 1/26/16 was consistent with Harris's esophagus, hiatal hernia and distal esophageal stricture with dilatation. Due to oral iron intolerance and increase in reflux symptoms with its use, parenteral replacements were suggested by Dr. Lanny Fuentes. [facility] was thus referred for potential parenteral iron therapy.    Her initial CBC in this office (02/18/16) revealed a WBC of 10.88, Hgb 12.2 with an MCV of 79.2 and PLT of 367,000. Full iron panel on 2/18/16 was again consistent with deficiency, serum iron 18, , iron sat 4% with ferritin of 12. Urinalysis was negative for hematuria, stool for occult blood ×3 negative there was no M-spike on SPEP and there were no signs of hemolysis with a normal LDH and haptoglobin. B12 was marginal at 370, for which Lyudmila España is receiving parenteral B12 replacements per Dr. Karely Shields. Colonoscopy and endoscopy were most recently performed on 9/1/2016.  11/5/2018 Colonoscopy at [address] [place] [address] intestine, cecum, polypectomy: Adenomatous polyp. Large intestine, right colon, biopsy: Fragment of benign colonic mucosa. Comment: Histologic changes of collagenous or lymphocytic (microscopic) colitis are not identified. \"Ascending colon polyp \": Fragment of benign colonic mucosa. Histologic changes of an adenomatous polyp or hyperplastic polyp are not identified. \"Transverse colon polyp \": Fragment of benign colonic mucosa demonstrating glandular hyperplastic changes and cautery artifact. 1/15/2021-the patient was again seen by me. Anemia profile labs were ordered. 1/5/21 anemia labs: Iron 30 TIBC 437 UIBC 407 Iron sat 7% Ferritin 15  CBC 5/3/21:  WBC 12.5, ANC 81% HGB 12.2, MCV 76 PLT-396,000   5/11/2021-reviewed labs from January and also CBC 5/3/2021. The patient has not received an IV iron therapy. She has poor tolerance to oral iron supplementation. S/p 2 doses IV Injectafer x2 doses. 11/6/2021 CT Low Dose Screening [facility], [place]) No worrisome pulmonary nodules. Lung-RADS 1. Per the NCCN guidelines follow-up low dose CT chest in one year recommended. Cardiomegaly. Mild mediastinal lymphadenopathy. This is likely reactive   and secondary to emphysema. 5/8/2022 CTA Chest W Contrast([facility]) No evidence of pulmonary embolus. Bilateral pneumonia or edema is superimposed upon emphysema.   5/20/2022 NM WBC Spect CT [facility] [place]) Abnormal uptake in the right fourth toe corresponding to the finding on bone scan and likely related to osteomyelitis.            TREATMENT SUMMARY:  Weekly Venofer x6 3/23/16   Weekly Venofer ×6 initiated 2016  May 2021-Injectafer x2 doses      PAST MEDICAL HISTORY:   Past Medical History:   Diagnosis Date    Anemia     Harris's esophagus     CAD (coronary artery disease)     non occlusive     CHF (congestive heart failure) (HCC)     COPD (chronic obstructive pulmonary disease) (HCC)     Emphysema of lung (HCC)     Enlarged heart     Family history of heart disease in male family member before age 54     father MI 39, sister cardiomyopathy 63 yo    Fibromyalgia     GERD (gastroesophageal reflux disease)     Hyperlipidemia     Hypertension     Irritable bowel syndrome     Morbid obesity (HCC)     Neuropathy     Nicotine dependence     Osteoarthritis     Osteopenia     Paroxysmal atrial fibrillation (HCC)     TIA (transient ischemic attack)     Type II or unspecified type diabetes mellitus without mention of complication, not stated as uncontrolled     Unspecified sleep apnea     Bi-pap    Varicose veins           PAST SURGICAL HISTORY:  Past Surgical History:   Procedure Laterality Date     SECTION      two c-sections    COLONOSCOPY      DILATION AND CURETTAGE OF UTERUS N/A 2016    EXAM UNDER ANESTHESIA; DILATATION AND CURETTAGE HYSTEROSCOPY performed by Braxton Jane MD at [address] [place] [address]      esophagus stretced 3x's    LAPAROSCOPY OOPHORECTOMY N/A 2016    DIAGNOSTIC LAPAROSCOPY  performed by Braxton Jane MD at [address]      nerve conduction, nerve release on right hand    OTHER SURGICAL HISTORY      ear surgery removed bone replace with wiring (right)    VARICOSE VEIN SURGERY Left 13 DJ    LGSV ablation    VARICOSE VEIN SURGERY Right 2013 [facility]    RGSV ablation    WRIST SURGERY      carpel tunnel bilateral wrist        SOCIAL HISTORY:  Social History     Socioeconomic History    Marital status:      Spouse name: None    Number of children: None    Years of education: None    Highest education level: None   Tobacco Use    Smoking status: Every Day     Packs/day: 0.50     Years: 45.00     Pack years: 22.50     Types: Cigarettes    Smokeless tobacco: Never   Vaping Use    Vaping Use: Never used   Substance and Sexual Activity    Alcohol use: No     Alcohol/week: 0.0 standard drinks    Drug use: No    Sexual activity: Not Currently       FAMILY HISTORY:  Family History   Problem Relation Age of Onset    High Blood Pressure Mother     Heart Disease Father     High Cholesterol Sister     Cancer Sister     Diabetes Sister         Current Outpatient Medications   Medication Sig Dispense Refill    Semaglutide (OZEMPIC, 0.25 OR 0.5 MG/DOSE, SC) Inject into the skin      MAGNESIUM OXIDE 400 PO Take 1 tablet by mouth 2 times daily      dilTIAZem (CARDIZEM CD) 180 MG extended release capsule       Calcium Carbonate-Vitamin D (OYSTER SHELL CALCIUM/D) 250-125 MG-UNIT TABS Take 1 tablet by mouth once a week      DULoxetine (CYMBALTA) 60 MG extended release capsule       metoprolol succinate (TOPROL XL) 25 MG extended release tablet       nitroGLYCERIN (NITROSTAT) 0.4 MG SL tablet Place 0.4 mg under the tongue as needed      pravastatin (PRAVACHOL) 20 MG tablet Take 20 mg by mouth nightly      SITagliptin-metFORMIN HCl ER (JANUMET XR) 100-1000 MG TB24 Take 1 tablet by mouth daily      potassium chloride (KLOR-CON M) 10 MEQ extended release tablet Take 10 mEq by mouth daily       allopurinol (ZYLOPRIM) 100 MG tablet Take 100 mg by mouth daily      HYDROcodone-acetaminophen (NORCO) 7.5-325 MG per tablet Take 7.5-500 tablets by mouth 3 times daily as needed.       empagliflozin (JARDIANCE) 10 MG tablet Take 1 tablet by mouth daily      esomeprazole (NEXIUM) 40 MG delayed release capsule Take 40 mg by mouth 2 times daily      furosemide (LASIX) 40 MG Alert, appropriate, no acute distress  EYES: Non icteric, EOM intact, pupils equal round   ENT: Mucus membranes moist, no oral pharyngeal lesions, external inspection of ears and nose are normal.  NECK: Supple, no masses. No palpable thyroid mass  CHEST/LUNGS: CTA bilaterally, normal respiratory effort   CARDIOVASCULAR: RRR, no murmurs. No lower extremity edema  ABDOMEN: soft non-tender, active bowel sounds, no HSM. No palpable masses  EXTREMITIES: warm, full ROM in all 4 extremities, no focal weakness. SKIN: warm, dry with no rashes or lesions  LYMPH: No cervical, clavicular, axillary, or inguinal lymphadenopathy  NEUROLOGIC: follows commands, non focal   PSYCH: mood and affect appropriate. Alert and oriented to time, place, person      Relevant Labs reviewed by me:  8/26/2022 CBC ([facility])  WBC 11.5  HGB 14.8/MCV 78    Neut 8.5  Cr 1.03, LFTs- normal  Glucose 203    08/26/22- Iron 33, TIBC 242, Iron sat 8%, Ferritin 23. Relevant Imaging reviewed by me:  11/6/2021 CT Low Dose Screening [facility]) No worrisome pulmonary nodules. Lung-RADS 1. Per the NCCN guidelines follow-up low dose CT chest in one year recommended. Cardiomegaly. Mild mediastinal lymphadenopathy. This is likely reactive   and secondary to emphysema. 5/8/2022 CTA Chest W Contrast([facility]) No evidence of pulmonary embolus. Bilateral pneumonia or edema is superimposed upon emphysema. 5/20/2022 NM WBC Spect CT [facility]) Abnormal uptake in the right fourth toe corresponding to the finding on bone scan and likely related to osteomyelitis. ASSESSMENT    No orders of the defined types were placed in this encounter. Mukesh Jiang was seen today for follow-up.     Diagnoses and all orders for this visit:    Iron deficiency anemia, unspecified iron deficiency anemia type    Personal history of tobacco use, presenting hazards to health    Care plan discussed with patient    Peripheral vascular disease ([address]        Iron deficiency anemia-  November 2018-colonoscopy was unremarkable  Anemia labs 1/5/2021 showed Iron 30, TIBC 437, UIBC 407, Iron sat 7%, Ferritin 15.   5/3/2021-hemoglobin 12.2/MCV 76.9  Poor tolerance to p.o. iron supplementation. Patient still feeling quite fatigued. 5/11/2021-s/p IV Injectafer 750 mg x 2 doses  8/26/2022 CBC ([facility] [address])  WBC 11.5  HGB 14.8/MCV 78    Neut 8.5  Cr 1.03, LFTs- normal  Glucose 203  08/26/22- Iron 33, TIBC 242, Iron sat 8%, Ferritin 23. Plan:  -IV venofer to be done in [facility] Maintenance: The patient is encouraged to follow-up with primary care regularly for further recommendations regarding age appropriated screening for cancer, well-being visit (preventative  measures), follow-up and treatment of other medical comorbidities. We will order mammogram.       At risk for Lung Cancer-CT low-dose lung cancer screening was unremarkable. Repeat CT lung cancer screening next available. 11/6/2021 CT Low Dose Screening [facility]) No worrisome pulmonary nodules. Lung-RADS 1. Per the NCCN guidelines follow-up low dose CT chest in one year recommended. Cardiomegaly. Mild mediastinal lymphadenopathy. This is likely reactive   and secondary to emphysema. 5/8/2022 CTA Chest W Contrast([facility]) No evidence of pulmonary embolus. Bilateral pneumonia or edema is superimposed upon emphysema.  -Next Ct chest May 2023     A fib. -Continue chronic anticoagulation as per PCP. Eliquis    PVD- Plavix 75mg PO daily    Carotid artery disease- followed by vascular  -70% narrowing  -Planning surgery     Smoking cessation- encouraged. Hypertension-improved. Continue follow-up with primary care.   /62   Pulse 64   Ht 5' 2.5\" (1.588 m)   Wt 207 lb (93.9 kg)   LMP 03/01/2013 (Within Months)   SpO2 92%   BMI 37.26 kg/m²      Plan:   RTC with MD 6 months in [facility] screening with CT low-dose at [facility] in [facility] 2023  Follow-up with primary care for other medical problems  IV venofer 1000mg to be given in [place]      Follow Up:     No follow-ups on file. Data Trenna Hammans, am pre charting  as Medical Assistant for Apryl Phelan MD. Electronically signed by Viktoriya Willingham MA on 8/30/2022 at 5:21 PM CDT. Rolo Mello am scribing for Apryl Phelan MD. Electronically signed by Davie Rivers RN on 8/30/2022 at 11:54 AM CDT. I, Dr Linn Emmanuel, personally performed the services described in this documentation as scribed by Davie Rivers RN in my presence and is both accurate and complete. I have seen, examined and reviewed this patient medication list, appropriate labs and imaging studies. I reviewed relevant medical records and others physicians notes. I discussed the plans of care with the patient. I answered all the questions to the patients satisfaction. I have also reviewed the chief complaint (CC) and part of the history (History of Present Illness (HPI), Past Family Social History [facility]), or Review of Systems (ROS) and made changes when appropriated.        (Please note that portions of this note were completed with a voice recognition program. Efforts were made to edit the dictations but occasionally words are mis-transcribed.)  Electronically signed by Apryl Phelan MD on 8/30/2022 at 11:58 AM

## 2022-08-30 ENCOUNTER — OFFICE VISIT (OUTPATIENT)
Dept: HEMATOLOGY | Age: 72
End: 2022-08-30
Payer: MEDICARE

## 2022-08-30 VITALS
SYSTOLIC BLOOD PRESSURE: 136 MMHG | WEIGHT: 207 LBS | HEART RATE: 64 BPM | OXYGEN SATURATION: 92 % | BODY MASS INDEX: 36.68 KG/M2 | HEIGHT: 63 IN | DIASTOLIC BLOOD PRESSURE: 62 MMHG

## 2022-08-30 DIAGNOSIS — I73.9 PERIPHERAL VASCULAR DISEASE (HCC): ICD-10-CM

## 2022-08-30 DIAGNOSIS — Z71.89 CARE PLAN DISCUSSED WITH PATIENT: ICD-10-CM

## 2022-08-30 DIAGNOSIS — D50.9 IRON DEFICIENCY ANEMIA, UNSPECIFIED IRON DEFICIENCY ANEMIA TYPE: Primary | ICD-10-CM

## 2022-08-30 DIAGNOSIS — Z87.891 PERSONAL HISTORY OF TOBACCO USE, PRESENTING HAZARDS TO HEALTH: ICD-10-CM

## 2022-08-30 PROCEDURE — 1123F ACP DISCUSS/DSCN MKR DOCD: CPT | Performed by: INTERNAL MEDICINE

## 2022-08-30 PROCEDURE — 99214 OFFICE O/P EST MOD 30 MIN: CPT | Performed by: INTERNAL MEDICINE

## 2022-09-08 ENCOUNTER — DOCUMENTATION (OUTPATIENT)
Dept: ENDOCRINOLOGY | Facility: CLINIC | Age: 72
End: 2022-09-08

## 2022-09-23 ENCOUNTER — SPECIALTY PHARMACY (OUTPATIENT)
Dept: ENDOCRINOLOGY | Facility: CLINIC | Age: 72
End: 2022-09-23

## 2022-09-23 NOTE — PROGRESS NOTES
Specialty Pharmacy Refill Coordination Note     Milagros is a 71 y.o. female contacted today regarding refills of  Ozempic and synjardy specialty medication(s).    Reviewed and verified with patient:       Specialty medication(s) and dose(s) confirmed: yes    Refill Questions    Flowsheet Row Most Recent Value   Changes to allergies? No   Changes to medications? No   New conditions since last clinic visit No   Unplanned office visit, urgent care, ED, or hospital admission in the last 4 weeks  No   How does patient/caregiver feel medication is working? Good   Financial problems or insurance changes  No   Since the previous refill, were any specialty medication doses or scheduled injections missed or delayed?  No   Does this patient require a clinical escalation to a pharmacist? No          Delivery Questions    Flowsheet Row Most Recent Value   Delivery method FedEx   Delivery address correct? Yes   Delivery phone number [phone]   Number of medications in delivery 2   Medication being filled and delivered ozempic and synjardy   Doses left of specialty medications 1   Is there any medication that is due not being filled? No   Copay form of payment Credit card on file   Questions or concerns for the pharmacist? No   Are any medications first time fills? No        Pt is doing good on her medications.  We are going to mail out her meds on Tuesday since she doses last dose on Sunday.          Follow-up: 3 months.      Tolu Carmona  Specialty Pharmacy Technician

## 2022-10-25 ENCOUNTER — DOCUMENTATION (OUTPATIENT)
Dept: ENDOCRINOLOGY | Facility: CLINIC | Age: 72
End: 2022-10-25

## 2022-10-31 ENCOUNTER — TELEPHONE (OUTPATIENT)
Dept: ENDOCRINOLOGY | Facility: CLINIC | Age: 72
End: 2022-10-31

## 2022-10-31 ENCOUNTER — TELEMEDICINE (OUTPATIENT)
Dept: ENDOCRINOLOGY | Facility: CLINIC | Age: 72
End: 2022-10-31

## 2022-10-31 DIAGNOSIS — E08.42 DIABETIC POLYNEUROPATHY ASSOCIATED WITH DIABETES MELLITUS DUE TO UNDERLYING CONDITION: ICD-10-CM

## 2022-10-31 DIAGNOSIS — E11.65 TYPE 2 DIABETES MELLITUS WITH HYPERGLYCEMIA, WITH LONG-TERM CURRENT USE OF INSULIN: Primary | ICD-10-CM

## 2022-10-31 DIAGNOSIS — E78.00 HYPERCHOLESTEROLEMIA: ICD-10-CM

## 2022-10-31 DIAGNOSIS — Z79.4 TYPE 2 DIABETES MELLITUS WITH HYPERGLYCEMIA, WITH LONG-TERM CURRENT USE OF INSULIN: Primary | ICD-10-CM

## 2022-10-31 DIAGNOSIS — I15.2 HYPERTENSION ASSOCIATED WITH DIABETES: ICD-10-CM

## 2022-10-31 DIAGNOSIS — E11.59 HYPERTENSION ASSOCIATED WITH DIABETES: ICD-10-CM

## 2022-10-31 PROCEDURE — 99214 OFFICE O/P EST MOD 30 MIN: CPT | Performed by: INTERNAL MEDICINE

## 2022-10-31 NOTE — PROGRESS NOTES
CC, Type 2 DM    Mode of Visit: Video  Location of patient: Home  You have chosen to receive care through a telehealth visit.  Does the patient consent to use a video/audio connection for your medical care today? Yes  The visit included audio and video interaction. No technical issues occurred during this visit                                        History of Present Illness    72 y.o. female     Diabetes Type 2    Duration - More than 10 years    Complications -Neuropathy and CAD     Present Monitoring -      Fingersticks -     CGM - Kervin    Present Regimen -see below    Carb Intake - minimized carbs    Exercise - cardiac rehab , walking     ==========================================  Physical Exam  There were no vitals taken for this visit.  AOx3  No goiter, no carotid bruit  Normal resp effort  No Edema     Foot exam   preulcerative callus bilaterally   Decreased sensation to monofilament     ==========================================    Laboratory Workup    Lab Results   Component Value Date    WBC 13.6 (H) 06/20/2022    HGB 14.9 06/20/2022    HCT 50.0 (H) 06/20/2022    MCV 84.2 06/20/2022     06/20/2022       Lab Results   Component Value Date    BUN 13 06/15/2018    CREATININE 0.8 06/15/2018    K 4.0 11/02/2021    CO2 30 06/15/2018    CALCIUM 9.2 06/15/2018           ==========================================      ICD-10-CM ICD-9-CM   1. Type 2 diabetes mellitus with hyperglycemia, with long-term current use of insulin ([facility])  E11.65 250.00    Z79.4 790.29     V58.67   2. Hypertension associated with diabetes ([facility])  E11.59 250.80    I15.2 401.9   3. Hypercholesterolemia  E78.00 272.0   4. Diabetic polyneuropathy associated with diabetes mellitus due to underlying condition ([facility])  E08.42 249.60     357.2       Diabetes Mellitus    --------------------------------------------------------------------------------------------------------------------------------------------    Glycemic Management   Lab  Results   Component Value Date    HGBA1C 8.7 (H) 05/22/2022       Kervin   Using reader    synjardy 10/1000 mg once daily   Ozempic 0.5 mg weekly     U500 through pump        Basal    MN 0.35  7am 0.4  6pm 0.45   10 pm 0.35    Manual bolus of 20 for bkfast  and 15 for supper       ==========================================================================    Lipids  Lab Results   Component Value Date    CHLPL 176 12/30/2021    TRIG 261 (H) 12/30/2021    HDL 40 12/30/2021    LDL 84 12/30/2021       Statin  pravachol        ==========================================================================    Blood Pressure  There were no vitals taken for this visit.       ==========================================================================    Thyroid Assessment  No results found for: TSH        ==========================================================================    Vitamin B12  No results found for: [id number]      ==========================================================================               Orders Placed This Encounter   Procedures   • CBC Auto Differential     Standing Status:   Future     Standing Expiration Date:   10/31/2023     Order Specific Question:   Release to patient     Answer:   Routine Release   • Comprehensive Metabolic Panel     Standing Status:   Future     Standing Expiration Date:   10/31/2023     Order Specific Question:   Release to patient     Answer:   Routine Release   • Hemoglobin A1c     Standing Status:   Future     Standing Expiration Date:   10/31/2023     Order Specific Question:   Release to patient     Answer:   Routine Release   • Lipid Panel     Standing Status:   Future     Standing Expiration Date:   10/31/2023   • Microalbumin / Creatinine Urine Ratio - Urine, Clean Catch     Standing Status:   Future     Standing Expiration Date:   10/31/2023     Order Specific Question:   Release to patient     Answer:   Routine Release   • TSH     Standing Status:   Future      Standing Expiration Date:   10/31/2023     Order Specific Question:   Release to patient     Answer:   Routine Release   • Vitamin B12     Standing Status:   Future     Standing Expiration Date:   10/31/2023     Order Specific Question:   Release to patient     Answer:   Routine Release                This document has been electronically signed by Darek Pascal MD on October 31, 2022 13:09 CDT

## 2022-11-01 RX ORDER — PEN NEEDLE, DIABETIC 31 GX5/16"
NEEDLE, DISPOSABLE MISCELLANEOUS
Qty: 360 EACH | Refills: 0 | Status: SHIPPED | OUTPATIENT
Start: 2022-11-01

## 2022-11-01 RX ORDER — PRAVASTATIN SODIUM 20 MG
TABLET ORAL
Qty: 90 TABLET | Refills: 0 | Status: SHIPPED | OUTPATIENT
Start: 2022-11-01

## 2022-11-01 NOTE — TELEPHONE ENCOUNTER
Sudha from [place] called and is asking if she can change patient prescription from Kervin 14 day Sensors to the Kervin 2 Sensors. She stated that Kervin does not make the 14 day anymore and also the patient does not have a compatible phone. She is requesting call back.     Phone [phone]  Fax: [phone]

## 2022-11-04 ENCOUNTER — SPECIALTY PHARMACY (OUTPATIENT)
Dept: ENDOCRINOLOGY | Facility: CLINIC | Age: 72
End: 2022-11-04

## 2022-11-04 NOTE — PROGRESS NOTES
Specialty Pharmacy Refill Coordination Note     Milagros is a 72 y.o. female contacted today regarding refills of  OZEMPIC specialty medication(s).    Reviewed and verified with patient:  Allergies       Specialty medication(s) and dose(s) confirmed: yes    Refill Questions    Flowsheet Row Most Recent Value   Changes to allergies? No   Changes to medications? No   New conditions since last clinic visit No   Unplanned office visit, urgent care, ED, or hospital admission in the last 4 weeks  No   How does patient/caregiver feel medication is working? Good   Financial problems or insurance changes  No   Since the previous refill, were any specialty medication doses or scheduled injections missed or delayed?  No   Does this patient require a clinical escalation to a pharmacist? No          Delivery Questions    Flowsheet Row Most Recent Value   Delivery method FedEx   Delivery address correct? Yes   Delivery phone number [phone]   Number of medications in delivery 1   Medication being filled and delivered OZEMPIC   Doses left of specialty medications 2   Is there any medication that is due not being filled? No   Supplies needed? No supplies needed   Cooler needed? Yes   Do any medications need mixed or dated? No   Questions or concerns for the pharmacist? No   Are any medications first time fills? No        Mailing out on Monday no issues to report.          Follow-up: 3 months.      Tolu Carmona  Specialty Pharmacy Technician

## 2022-11-07 ENCOUNTER — DOCUMENTATION (OUTPATIENT)
Dept: ENDOCRINOLOGY | Facility: CLINIC | Age: 72
End: 2022-11-07

## 2022-11-14 ENCOUNTER — DOCUMENTATION (OUTPATIENT)
Dept: ENDOCRINOLOGY | Facility: CLINIC | Age: 72
End: 2022-11-14

## 2022-12-13 ENCOUNTER — TELEPHONE (OUTPATIENT)
Dept: HEMATOLOGY | Age: 72
End: 2022-12-13

## 2022-12-21 ENCOUNTER — TELEPHONE (OUTPATIENT)
Dept: ENDOCRINOLOGY | Facility: CLINIC | Age: 72
End: 2022-12-21

## 2022-12-28 ENCOUNTER — SPECIALTY PHARMACY (OUTPATIENT)
Dept: ENDOCRINOLOGY | Facility: CLINIC | Age: 72
End: 2022-12-28

## 2022-12-28 NOTE — PROGRESS NOTES
Specialty Pharmacy Refill Coordination Note     Milagros is a 72 y.o. female contacted today regarding refills of  synjardy  specialty medication(s).    Reviewed and verified with patient:       Specialty medication(s) and dose(s) confirmed: yes    Refill Questions    Flowsheet Row Most Recent Value   Changes to allergies? No   Changes to medications? No   New conditions since last clinic visit No   Unplanned office visit, urgent care, ED, or hospital admission in the last 4 weeks  No   How does patient/caregiver feel medication is working? Good   Financial problems or insurance changes  No   Since the previous refill, were any specialty medication doses or scheduled injections missed or delayed?  No   Does this patient require a clinical escalation to a pharmacist? No          Delivery Questions    Flowsheet Row Most Recent Value   Delivery method FedEx   Delivery address correct? Yes   Delivery phone number [phone]   Number of medications in delivery 1   Medication being filled and delivered synardy   Is there any medication that is due not being filled? No   Supplies needed? No supplies needed   Cooler needed? No   Do any medications need mixed or dated? No   Questions or concerns for the pharmacist? No   Are any medications first time fills? No                 Follow-up: 30 day(s)     Faviola Morales, Pharmacy Technician  Specialty Pharmacy Technician

## 2023-01-26 ENCOUNTER — SPECIALTY PHARMACY (OUTPATIENT)
Dept: ENDOCRINOLOGY | Facility: CLINIC | Age: 73
End: 2023-01-26
Payer: MEDICARE

## 2023-01-30 ENCOUNTER — SPECIALTY PHARMACY (OUTPATIENT)
Dept: ENDOCRINOLOGY | Facility: CLINIC | Age: 73
End: 2023-01-30
Payer: MEDICARE

## 2023-01-30 NOTE — PROGRESS NOTES
Specialty Pharmacy Refill Coordination Note     Milagros is a 72 y.o. female contacted today regarding refills of  ozempic  specialty medication(s).    Reviewed and verified with patient:       Specialty medication(s) and dose(s) confirmed: yes    Refill Questions    Flowsheet Row Most Recent Value   Changes to allergies? No   Changes to medications? No   New conditions since last clinic visit No   How does patient/caregiver feel medication is working? Good   Financial problems or insurance changes  No   Since the previous refill, were any specialty medication doses or scheduled injections missed or delayed?  No   Does this patient require a clinical escalation to a pharmacist? No          Delivery Questions    Flowsheet Row Most Recent Value   Delivery method FedEx   Delivery address correct? Yes   Delivery phone number [phone]   Number of medications in delivery 1   Medication being filled and delivered [id number]   Supplies needed? No supplies needed   Cooler needed? Yes   Do any medications need mixed or dated? No   Questions or concerns for the pharmacist? No   Are any medications first time fills? No                 Follow-up: 30 day(s)     Faviola Morales, Pharmacy Technician  Specialty Pharmacy Technician

## 2023-01-30 NOTE — PROGRESS NOTES
MEDICAL ONCOLOGY PROGRESS NOTE      Travis Fry   1950  1/31/2023     Chief Complaint   Patient presents with    Other     anemia            INTERVAL HISTORY/HISTORY OF PRESENT ILLNESS:  Travis Fry  This is a telephone visit due to encouragement provided. The patient continues to complains of significant fatigue. She had iron deficiency and received IV iron in [place]. Past history of Harris's esophagus. This is followed by Dr. Alexandre Palomares in [place]. She had recent carotid surgery at [facility].  She is preparing to move with her daughter in [place], [place].       HEMATOLOGY HISTORY: Iron deficiency anemia  Ramy Morgan was seen in initial hematology consultation on 2/94/3791 referred by Parag ELIZABETH for evaluation of iron deficiency anemia. Labs from 12/16/2015 revealed a WBC of 8.2 with normal differential, Hgb of 12.1 with an MCV of 79.2 and PLT of 347,000. Serum iron was low at 26 and thyroid function-TSH was normal at 0.6. CMP was fairly normal and B12 was 578. Due to the microcytic indices she was started on oral iron replacement therapy, though poorly tolerated. Ramy Morgan has a history of Harris's esophagus, followed by Dr. Janis Orta. Alexandre Palomares. Endoscopy on 1/26/16 was consistent with Harris's esophagus, hiatal hernia and distal esophageal stricture with dilatation. Due to oral iron intolerance and increase in reflux symptoms with its use, parenteral replacements were suggested by Dr. Alexandre Palomraes. Ramy Morgan was thus referred for potential parenteral iron therapy. Her initial CBC in this office (02/18/16) revealed a WBC of 10.88, Hgb 12.2 with an MCV of 79.2 and PLT of 367,000. Full iron panel on 2/18/16 was again consistent with deficiency, serum iron 18, , iron sat 4% with ferritin of 12. Urinalysis was negative for hematuria, stool for occult blood ×3 negative there was no M-spike on SPEP and there were no signs of hemolysis with a normal LDH and haptoglobin.  B12 was marginal at 370, for which Nicole Azar is receiving parenteral B12 replacements per Lo Hubbard. Colonoscopy and endoscopy were most recently performed on 9/1/2016.  11/5/2018 Colonoscopy at [facility] intestine, cecum, polypectomy: Adenomatous polyp. Large intestine, right colon, biopsy: Fragment of benign colonic mucosa. Comment: Histologic changes of collagenous or lymphocytic (microscopic) colitis are not identified. \"Ascending colon polyp \": Fragment of benign colonic mucosa. Histologic changes of an adenomatous polyp or hyperplastic polyp are not identified. \"Transverse colon polyp \": Fragment of benign colonic mucosa demonstrating glandular hyperplastic changes and cautery artifact. 1/15/2021-the patient was again seen by me. Anemia profile labs were ordered. 1/5/21 anemia labs: Iron 30 TIBC 437 UIBC 407 Iron sat 7% Ferritin 15  CBC 5/3/21:  WBC 12.5, ANC 81% HGB 12.2, MCV 76 PLT-396,000   5/11/2021-reviewed labs from January and also CBC 5/3/2021. The patient has not received an IV iron therapy. She has poor tolerance to oral iron supplementation. S/p 2 doses IV Injectafer x2 doses. 11/6/2021 CT Low Dose Screening [facility]) No worrisome pulmonary nodules. Lung-RADS 1. Per the NCCN guidelines follow-up low dose CT chest in one year recommended. Cardiomegaly. Mild mediastinal lymphadenopathy. This is likely reactive and secondary to emphysema. 5/8/2022 CTA Chest W Contrast([facility]) No evidence of pulmonary embolus. Bilateral pneumonia or edema is superimposed upon emphysema. 5/20/2022 NM WBC Spect CT [facility]) Abnormal uptake in the right fourth toe corresponding to the finding on bone scan and likely related to osteomyelitis.   9/9/22 Iron Studies [facility]): Iron 82, TIBC 355,Iron Sat 23, Ferritin 486           TREATMENT SUMMARY:  Weekly Venofer x6 3/23/16   Weekly Venofer ×6 initiated 7/7/2016  May 2021-Injectafer x2 doses      PAST MEDICAL HISTORY:   Past Medical History:   Diagnosis Date    Anemia Harris's esophagus     CAD (coronary artery disease)     non occlusive     CHF (congestive heart failure) (HCC)     COPD (chronic obstructive pulmonary disease) (HCC)     Emphysema of lung (HCC)     Enlarged heart     Family history of heart disease in male family member before age 54     father MI 39, sister cardiomyopathy 63 yo    Fibromyalgia     GERD (gastroesophageal reflux disease)     Hyperlipidemia     Hypertension     Irritable bowel syndrome     Morbid obesity (HCC)     Neuropathy     Nicotine dependence     Osteoarthritis     Osteopenia     Paroxysmal atrial fibrillation (HCC)     TIA (transient ischemic attack)     Type II or unspecified type diabetes mellitus without mention of complication, not stated as uncontrolled     Unspecified sleep apnea     Bi-pap    Varicose veins           PAST SURGICAL HISTORY:  Past Surgical History:   Procedure Laterality Date     SECTION      two c-sections    COLONOSCOPY      DILATION AND CURETTAGE OF UTERUS N/A 2016    EXAM UNDER ANESTHESIA; DILATATION AND CURETTAGE HYSTEROSCOPY performed by Brain Deleon MD at [address] [place] [address]      esophagus stretced 3x's    LAPAROSCOPY OOPHORECTOMY N/A 2016    DIAGNOSTIC LAPAROSCOPY  performed by Brain Deleon MD at [address]      nerve conduction, nerve release on right hand    OTHER SURGICAL HISTORY      ear surgery removed bone replace with wiring (right)    VARICOSE VEIN SURGERY Left 13 DJ    LGSV ablation    VARICOSE VEIN SURGERY Right 2013 [facility]    RGSV ablation    WRIST SURGERY      carpel tunnel bilateral wrist        SOCIAL HISTORY:  Social History     Socioeconomic History    Marital status:    Tobacco Use    Smoking status: Every Day     Packs/day: 0.50     Years: 45.00     Pack years: 22.50     Types: Cigarettes    Smokeless tobacco: Never   Vaping Use    Vaping Use: Never used   Substance and Sexual Activity    Alcohol use: No     Alcohol/week: 0.0 standard drinks    Drug use: No    Sexual activity: Not Currently     FAMILY HISTORY:  Family History   Problem Relation Age of Onset    High Blood Pressure Mother     Heart Disease Father     High Cholesterol Sister     Cancer Sister     Diabetes Sister         Current Outpatient Medications   Medication Sig Dispense Refill    Semaglutide (OZEMPIC, 0.25 OR 0.5 MG/DOSE, SC) Inject into the skin      MAGNESIUM OXIDE 400 PO Take 1 tablet by mouth 2 times daily      dilTIAZem (CARDIZEM CD) 180 MG extended release capsule       Calcium Carbonate-Vitamin D (OYSTER SHELL CALCIUM/D) 250-125 MG-UNIT TABS Take 1 tablet by mouth once a week      DULoxetine (CYMBALTA) 60 MG extended release capsule       metoprolol succinate (TOPROL XL) 25 MG extended release tablet       pravastatin (PRAVACHOL) 20 MG tablet Take 20 mg by mouth nightly      SITagliptin-metFORMIN HCl ER (JANUMET XR) 100-1000 MG TB24 Take 1 tablet by mouth daily      warfarin (COUMADIN) 5 MG tablet Take 5 mg by mouth See Admin Instructions 5mg M/W/F, 4mg all other days      potassium chloride (KLOR-CON M) 10 MEQ extended release tablet Take 10 mEq by mouth daily       allopurinol (ZYLOPRIM) 100 MG tablet Take 100 mg by mouth daily      HYDROcodone-acetaminophen (NORCO) 7.5-325 MG per tablet Take 7.5-500 tablets by mouth 3 times daily as needed. insulin regular human (HUMULIN R U-500 KWIKPEN) 500 UNIT/ML SOPN concentrated injection pen Inject 135 Units into the skin 2 times daily      esomeprazole (NEXIUM) 40 MG delayed release capsule Take 40 mg by mouth 2 times daily      furosemide (LASIX) 40 MG tablet Take 80 mg by mouth daily      gabapentin (NEURONTIN) 300 MG capsule Take 300 mg by mouth 3 times daily.  Take 2 tabs TID      lisinopril (PRINIVIL;ZESTRIL) 20 MG tablet Take 20 mg by mouth daily (Patient not taking: Reported on 8/30/2022)      clopidogrel (PLAVIX) 75 MG tablet Take 75 mg by mouth daily      nitroGLYCERIN (NITROSTAT) 0.4 MG SL tablet Place 0.4 mg under the tongue as needed       No current facility-administered medications for this visit. REVIEW OF SYSTEMS:   CONSTITUTIONAL: no fever, no night sweats, fatigue;  HEENT: no blurring of vision, no double vision, no hearing difficulty, no tinnitus, no ulceration, no dysplasia, no epistaxis;   LUNGS: no cough, no hemoptysis, no wheeze,  no shortness of breath;  CARDIOVASCULAR: no palpitation, no chest pain, no shortness of breath;  GI: no abdominal pain, no nausea, no vomiting, no diarrhea, no constipation;  MARY GRACE: no dysuria, no hematuria, no frequency or urgency, no nephrolithiasis;  MUSCULOSKELETAL: no joint pain, no swelling, no stiffness;  ENDOCRINE: no polyuria, no polydipsia, no cold or heat intolerance;  HEMATOLOGY: no easy bruising or bleeding, no history of clotting disorder;  DERMATOLOGY: no skin rash, no eczema, no pruritus;  PSYCHIATRY: no depression, no anxiety, no panic attacks, no suicidal ideation, no homicidal ideation;  NEUROLOGY: no syncope, no seizures, no numbness or tingling of hands, no numbness or tingling of feet, no paresis;    Vitals signs:  LMP 03/01/2013 (Within Months)    Pain scale:       PHYSICAL EXAM:  Telephone visit. Relevant Labs reviewed by me:  9/9/22 Iron Studies [facility]): Iron 82, TIBC 355,Iron Sat 23, Ferritin 486  9/9/22 CBC [facility])    9/9/22 CMP [facility])    Relevant Imaging reviewed by me:  None    ASSESSMENT    No orders of the defined types were placed in this encounter. Anabella Harden was seen today for other.     Diagnoses and all orders for this visit:    Iron deficiency anemia, unspecified iron deficiency anemia type    Personal history of tobacco use, presenting hazards to health    Care plan discussed with patient    Peripheral vascular disease ([facility] [address]      Iron deficiency anemia-  November 2018-colonoscopy was unremarkable  Anemia labs 1/5/2021 showed Iron 30, TIBC 437, UIBC 407, Iron sat 7%, Ferritin 15.   5/3/2021-hemoglobin 12.2/MCV 76.9  Poor tolerance to p.o. iron supplementation. Patient still feeling quite fatigued. 5/11/2021-s/p IV Injectafer 750 mg x 2 doses  8/26/2022 CBC ([address])  WBC 11.5  HGB 14.8/MCV 78    Neut 8.5  Cr 1.03, LFTs- normal  Glucose 203  08/26/22- Iron 33, TIBC 242, Iron sat 8%, Ferritin 23.  -S/p IV venofer in [place], Sep 2022  She did not have labs repeated. Health Maintenance: The patient is encouraged to follow-up with primary care regularly for further recommendations regarding age appropriated screening for cancer, well-being visit (preventative  measures), follow-up and treatment of other medical comorbidities. At risk for Lung Cancer-CT low-dose lung cancer screening was unremarkable. Repeat CT lung cancer screening next available. 11/6/2021 CT Low Dose Screening [facility] [place]) No worrisome pulmonary nodules. Lung-RADS 1. Per the NCCN guidelines follow-up low dose CT chest in one year recommended. Cardiomegaly. Mild mediastinal lymphadenopathy. This is likely reactive   and secondary to emphysema. 5/8/2022 CTA Chest W Contrast([facility]) No evidence of pulmonary embolus. Bilateral pneumonia or edema is superimposed upon emphysema.  -Next Ct chest May 2023. A fib. -Continue chronic anticoagulation as per PCP. Eliquis    PVD- Plavix 75mg PO daily. Carotid artery disease- followed by vascular  -70% narrowing  -Planning surgery     Smoking cessation- encouraged. Plan:   RTC with MD prn   Lung Cancer screening with CT low-dose at [facility] in [facility] 2023  Follow-up with primary care for other medical problems  Recommended age appropriate screening  Go PCP and have some labs. Follow Up:     Return if symptoms worsen or fail to improve. Data Sanam Manriquez am pre charting  as Medical Assistant for Ron Meyer MD. Electronically signed by Kezia Jackson MA on 1/31/2023 at 12:36 PM CST.     Malgorzata COLEY am scribing for Ron Meyer MD. Electronically signed by Sanjay Eaton RN on 1/31/2023 at 12:12 PM CST. I, Dr Jamey Adames, personally performed the services described in this documentation as scribed by Sanjay Eaton RN in my presence and is both accurate and complete. I have seen, examined and reviewed this patient medication list, appropriate labs and imaging studies. I reviewed relevant medical records and others physicians notes. I discussed the plans of care with the patient. I answered all the questions to the patients satisfaction. I have also reviewed the chief complaint (CC) and part of the history (History of Present Illness (HPI), Past Family Social History [facility]), or Review of Systems (ROS) and made changes when appropriated. (Please note that portions of this note were completed with a voice recognition program. Efforts were made to edit the dictations but occasionally words are mis-transcribed. )Electronically signed by Cam Amado MD on 1/31/2023 at 12:29 PM       Jhonny Mulligan is a 81 y.o. female evaluated via telephone on 1/31/2023 for Other (anemia)        Documentation:  I communicated with the patient and/or health care decision maker about as above. Details of this discussion including any medical advice provided: as above    Total Time: minutes: 5-10 minutes    Jhonny Mulligan was evaluated through a synchronous (real-time) audio encounter. Patient identification was verified at the start of the visit. She (or guardian if applicable) is aware that this is a billable service, which includes applicable co-pays. This visit was conducted with the patient's (and/or legal guardian's) verbal consent. She has not had a related appointment within my department in the past 7 days or scheduled within the next 24 hours. The patient was located at Home: [place] [address]. The provider was located at [facility] ([place] [address]): [address] [place] [address], [address] [facility],  [place] [address].     Note: not billable if this call serves to triage the patient into an appointment for the relevant concern    Ron Meyer MD

## 2023-01-31 ENCOUNTER — TELEMEDICINE (OUTPATIENT)
Dept: HEMATOLOGY | Age: 73
End: 2023-01-31
Payer: MEDICARE

## 2023-01-31 DIAGNOSIS — Z71.89 CARE PLAN DISCUSSED WITH PATIENT: ICD-10-CM

## 2023-01-31 DIAGNOSIS — Z87.891 PERSONAL HISTORY OF TOBACCO USE, PRESENTING HAZARDS TO HEALTH: ICD-10-CM

## 2023-01-31 DIAGNOSIS — D50.9 IRON DEFICIENCY ANEMIA, UNSPECIFIED IRON DEFICIENCY ANEMIA TYPE: Primary | ICD-10-CM

## 2023-01-31 DIAGNOSIS — I73.9 PERIPHERAL VASCULAR DISEASE (HCC): ICD-10-CM

## 2023-01-31 PROCEDURE — 99441 PR PHYS/QHP TELEPHONE EVALUATION 5-10 MIN: CPT | Performed by: INTERNAL MEDICINE

## 2023-02-02 ENCOUNTER — TELEMEDICINE (OUTPATIENT)
Dept: ENDOCRINOLOGY | Facility: CLINIC | Age: 73
End: 2023-02-02
Payer: MEDICARE

## 2023-02-02 ENCOUNTER — SPECIALTY PHARMACY (OUTPATIENT)
Dept: ENDOCRINOLOGY | Facility: CLINIC | Age: 73
End: 2023-02-02
Payer: MEDICARE

## 2023-02-02 DIAGNOSIS — E78.00 HYPERCHOLESTEROLEMIA: ICD-10-CM

## 2023-02-02 DIAGNOSIS — Z79.4 TYPE 2 DIABETES MELLITUS WITH HYPERGLYCEMIA, WITH LONG-TERM CURRENT USE OF INSULIN: Primary | ICD-10-CM

## 2023-02-02 DIAGNOSIS — E11.65 TYPE 2 DIABETES MELLITUS WITH HYPERGLYCEMIA, WITH LONG-TERM CURRENT USE OF INSULIN: ICD-10-CM

## 2023-02-02 DIAGNOSIS — E11.59 HYPERTENSION ASSOCIATED WITH DIABETES: ICD-10-CM

## 2023-02-02 DIAGNOSIS — E08.42 DIABETIC POLYNEUROPATHY ASSOCIATED WITH DIABETES MELLITUS DUE TO UNDERLYING CONDITION: ICD-10-CM

## 2023-02-02 DIAGNOSIS — Z79.4 TYPE 2 DIABETES MELLITUS WITH HYPERGLYCEMIA, WITH LONG-TERM CURRENT USE OF INSULIN: ICD-10-CM

## 2023-02-02 DIAGNOSIS — I15.2 HYPERTENSION ASSOCIATED WITH DIABETES: ICD-10-CM

## 2023-02-02 DIAGNOSIS — E11.65 TYPE 2 DIABETES MELLITUS WITH HYPERGLYCEMIA, WITH LONG-TERM CURRENT USE OF INSULIN: Primary | ICD-10-CM

## 2023-02-02 PROCEDURE — 99214 OFFICE O/P EST MOD 30 MIN: CPT | Performed by: INTERNAL MEDICINE

## 2023-02-02 RX ORDER — EMPAGLIFLOZIN, METFORMIN HYDROCHLORIDE 10; 1000 MG/1; MG/1
1 TABLET, EXTENDED RELEASE ORAL
Qty: 30 TABLET | Refills: 11 | Status: SHIPPED | OUTPATIENT
Start: 2023-02-02

## 2023-02-02 RX ORDER — INSULIN HUMAN 500 [IU]/ML
INJECTION, SOLUTION SUBCUTANEOUS
Qty: 40 ML | Refills: 11 | Status: SHIPPED | OUTPATIENT
Start: 2023-02-02

## 2023-02-02 RX ORDER — EMPAGLIFLOZIN, METFORMIN HYDROCHLORIDE 10; 1000 MG/1; MG/1
1 TABLET, EXTENDED RELEASE ORAL
Qty: 30 TABLET | Refills: 11 | Status: SHIPPED | OUTPATIENT
Start: 2023-02-02 | End: 2023-02-02 | Stop reason: SDUPTHER

## 2023-02-02 RX ORDER — SEMAGLUTIDE 1.34 MG/ML
0.5 INJECTION, SOLUTION SUBCUTANEOUS WEEKLY
Qty: 1.5 ML | Refills: 11 | Status: SHIPPED | OUTPATIENT
Start: 2023-02-02

## 2023-02-02 NOTE — PROGRESS NOTES
Checked in with Ms. Nance before her appointment this afternoon  She has all her medicine as intended, has begun using omnipod  Doing well on ozempic 0.5 mg, can move up if needed  We will arrange any new medicines for her alongside Dr. Padron

## 2023-02-02 NOTE — PROGRESS NOTES
CC, Type 2 DM                                      This was a Telehealth Encounter. Benefits and Disadvantages of a Telehealth Visit were discussed and accepted by patient.  Mode of Visit: Video  Location of patient: Home  You have chosen to receive care through a telehealth visit.  Does the patient consent to use a video/audio connection for your medical care today? Yes  The visit included audio and video interaction. No technical issues occurred during this visit                                 History of Present Illness    72 y.o. female     Diabetes Type 2    Duration - More than 10 years    Complications -Neuropathy and CAD     Present Monitoring -      Fingersticks -     CGM - Kervin    Present Regimen -see below, states glucose is running in the 100s     Carb Intake - minimized carbs    Exercise - cardiac rehab , walking     ==========================================  Physical Exam  There were no vitals taken for this visit.  PE    There were no vitals taken for this visit.  AOx3  No visible goiter  Normal Respiratory Effort   No Edema    Labs    Lab Results   Component Value Date    WBC 13.6 (H) 06/20/2022    HGB 14.9 06/20/2022    HCT 50.0 (H) 06/20/2022    MCV 84.2 06/20/2022     06/20/2022     Lab Results   Component Value Date    BUN 13 06/15/2018    CREATININE 0.8 06/15/2018     06/15/2018    K 4.0 11/02/2021    CO2 30 06/15/2018    CALCIUM 9.2 06/15/2018     ==========================================      ICD-10-CM ICD-9-CM   1. Type 2 diabetes mellitus with hyperglycemia, with long-term current use of insulin ([facility])  E11.65 250.00    Z79.4 790.29     V58.67   2. Hypertension associated with diabetes ([facility])  E11.59 250.80    I15.2 401.9   3. Hypercholesterolemia  E78.00 272.0   4. Diabetic polyneuropathy associated with diabetes mellitus due to underlying condition ([facility])  E08.42 249.60     357.2   5. Pre-ulcerative calluses  L84 700       Diabetes  Mellitus    --------------------------------------------------------------------------------------------------------------------------------------------    Glycemic Management   Lab Results   Component Value Date    HGBA1C 8.7 (H) 05/22/2022       Kervin   Using reader, will bring to office for download     synjardy 10/1000 mg once daily   Ozempic 0.5 mg weekly     U500 through pump        Basal    MN 0.35  7am 0.4  6pm 0.45   10 pm 0.35    Manual bolus of 20 for bkfast  and 15 for supper - no longer doing this, as ozempic and synjardy covers most of the meals and uses occasional correction           ==========================================================================    Lipids  Lab Results   Component Value Date    CHLPL 176 12/30/2021    TRIG 261 (H) 12/30/2021    HDL 40 12/30/2021    LDL 84 12/30/2021       Statin  pravachol        ==========================================================================    Blood Pressure  There were no vitals taken for this visit.       ==========================================================================    Thyroid Assessment  No results found for: TSH        ==========================================================================    Vitamin B12  No results found for: [id number]      ==========================================================================               No orders of the defined types were placed in this encounter.               This document has been electronically signed by Darek Pascal MD on February 2, 2023 14:21 CST

## 2023-02-03 ENCOUNTER — TELEPHONE (OUTPATIENT)
Dept: ENDOCRINOLOGY | Facility: CLINIC | Age: 73
End: 2023-02-03
Payer: MEDICARE

## 2023-02-23 ENCOUNTER — SPECIALTY PHARMACY (OUTPATIENT)
Dept: ENDOCRINOLOGY | Facility: CLINIC | Age: 73
End: 2023-02-23
Payer: MEDICARE

## 2023-02-23 NOTE — PROGRESS NOTES
Specialty Pharmacy Refill Coordination Note     Milagros is a 72 y.o. female contacted today regarding refills of  ozempic specialty medication(s).    Specialty medication(s) and dose(s) confirmed: yes    Refill Questions    Flowsheet Row Most Recent Value   Changes to allergies? No   Changes to medications? No   New conditions since last clinic visit No   Unplanned office visit, urgent care, ED, or hospital admission in the last 4 weeks  No   How does patient/caregiver feel medication is working? Very good   Financial problems or insurance changes  No   Since the previous refill, were any specialty medication doses or scheduled injections missed or delayed?  No   Does this patient require a clinical escalation to a pharmacist? No          Delivery Questions    Flowsheet Row Most Recent Value   Delivery method [employer]Ex   Delivery address correct? Yes   Delivery phone number [phone]   Number of medications in delivery 1   Medication being filled and delivered [id number]   Doses left of specialty medications 1   Is there any medication that is due not being filled? No   Supplies needed? No supplies needed   Cooler needed? Yes   Do any medications need mixed or dated? No   Additional comments 0.00   Questions or concerns for the pharmacist? No   Are any medications first time fills? No        Patient has recently moved and we are going to mail her RX to her.  She is going to discuss with her daughter about transferring her other meds to us or pharmacy in [place]. She will give us a call back.          Follow-up: 1 month.      Tolu Carmona  Specialty Pharmacy Technician

## 2023-02-27 ENCOUNTER — TELEPHONE (OUTPATIENT)
Dept: ENDOCRINOLOGY | Facility: CLINIC | Age: 73
End: 2023-02-27
Payer: MEDICARE

## 2023-02-27 NOTE — TELEPHONE ENCOUNTER
Pt called, asking for a refill of the Freestyle Kervin 2 sensor please    Pharmacy she wants it sent to is [facility] [place].

## 2023-03-06 ENCOUNTER — TELEPHONE (OUTPATIENT)
Dept: ENDOCRINOLOGY | Facility: CLINIC | Age: 73
End: 2023-03-06
Payer: MEDICARE

## 2023-03-06 NOTE — TELEPHONE ENCOUNTER
Patient is requesting refill prescription for Freestyle Kervin 2 supplies be sent to [facility]. States she will run out in about 2 weeks.     Phone [phone]    Thank you

## 2023-05-17 ENCOUNTER — DOCUMENTATION (OUTPATIENT)
Dept: ENDOCRINOLOGY | Facility: CLINIC | Age: 73
End: 2023-05-17
Payer: MEDICARE

## 2023-08-14 RX ORDER — INSULIN PUMP CONTROLLER
EACH MISCELLANEOUS
Qty: 30 EACH | Refills: 3 | Status: SHIPPED | OUTPATIENT
Start: 2023-08-14

## 2023-09-07 ENCOUNTER — OFFICE VISIT (OUTPATIENT)
Dept: ENDOCRINOLOGY | Facility: CLINIC | Age: 73
End: 2023-09-07
Payer: MEDICARE

## 2023-09-07 VITALS
DIASTOLIC BLOOD PRESSURE: 50 MMHG | BODY MASS INDEX: 36.62 KG/M2 | OXYGEN SATURATION: 90 % | WEIGHT: 199 LBS | SYSTOLIC BLOOD PRESSURE: 120 MMHG | HEIGHT: 62 IN | HEART RATE: 97 BPM

## 2023-09-07 DIAGNOSIS — E11.59 HYPERTENSION ASSOCIATED WITH DIABETES: ICD-10-CM

## 2023-09-07 DIAGNOSIS — E78.00 HYPERCHOLESTEROLEMIA: ICD-10-CM

## 2023-09-07 DIAGNOSIS — E11.65 TYPE 2 DIABETES MELLITUS WITH HYPERGLYCEMIA, WITH LONG-TERM CURRENT USE OF INSULIN: Primary | ICD-10-CM

## 2023-09-07 DIAGNOSIS — I15.2 HYPERTENSION ASSOCIATED WITH DIABETES: ICD-10-CM

## 2023-09-07 DIAGNOSIS — E08.42 DIABETIC POLYNEUROPATHY ASSOCIATED WITH DIABETES MELLITUS DUE TO UNDERLYING CONDITION: ICD-10-CM

## 2023-09-07 DIAGNOSIS — Z79.4 TYPE 2 DIABETES MELLITUS WITH HYPERGLYCEMIA, WITH LONG-TERM CURRENT USE OF INSULIN: Primary | ICD-10-CM

## 2023-09-07 DIAGNOSIS — L84 PRE-ULCERATIVE CALLUSES: ICD-10-CM

## 2023-09-07 LAB — HBA1C MFR BLD: 8 %

## 2023-09-07 RX ORDER — PROCHLORPERAZINE 25 MG/1
SUPPOSITORY RECTAL AS NEEDED
Qty: 9 EACH | Refills: 3 | Status: SHIPPED | OUTPATIENT
Start: 2023-09-07

## 2023-09-07 RX ORDER — NALOXONE HYDROCHLORIDE 4 MG/.1ML
1 SPRAY NASAL AS NEEDED
COMMUNITY

## 2023-09-07 RX ORDER — INSULIN LISPRO-AABC 100 [IU]/ML
INJECTION, SOLUTION INTRAVENOUS; SUBCUTANEOUS
Qty: 50 ML | Refills: 11 | Status: SHIPPED | OUTPATIENT
Start: 2023-09-07

## 2023-09-07 RX ORDER — PROCHLORPERAZINE 25 MG/1
1 SUPPOSITORY RECTAL ONCE
Qty: 1 EACH | Refills: 3 | Status: SHIPPED | OUTPATIENT
Start: 2023-09-07 | End: 2023-09-07

## 2023-09-07 RX ORDER — CILOSTAZOL 50 MG/1
50 TABLET ORAL 2 TIMES DAILY
COMMUNITY

## 2023-09-07 RX ORDER — INSULIN PMP CART,AUT,G6/7,CNTR
1 EACH SUBCUTANEOUS ONCE
Qty: 1 KIT | Refills: 1 | Status: SHIPPED | OUTPATIENT
Start: 2023-09-07 | End: 2023-09-07

## 2023-09-07 RX ORDER — CLOPIDOGREL BISULFATE 75 MG/1
75 TABLET ORAL DAILY
COMMUNITY

## 2023-09-07 RX ORDER — EZETIMIBE 10 MG/1
10 TABLET ORAL DAILY
COMMUNITY

## 2023-09-07 RX ORDER — PROCHLORPERAZINE 25 MG/1
1 SUPPOSITORY RECTAL ONCE
Qty: 1 EACH | Refills: 1 | Status: SHIPPED | OUTPATIENT
Start: 2023-09-07 | End: 2023-09-07

## 2023-09-07 RX ORDER — INSULIN PMP CART,AUT,G6/7,CNTR
1 EACH SUBCUTANEOUS
Qty: 10 EACH | Refills: 11 | Status: SHIPPED | OUTPATIENT
Start: 2023-09-07

## 2023-09-07 NOTE — PROGRESS NOTES
"CC, Type 2 DM                                                                   History of Present Illness    72 y.o. female     Diabetes Type 2    Duration - More than 10 years    Complications -Neuropathy and CAD     Present Monitoring -      Fingersticks -     CGM - Kervin    Present Regimen -see below, states glucose is running in the 100s     Carb Intake - minimized carbs    Exercise - cardiac rehab , walking     ==========================================  Physical Exam  /50   Pulse 97   Ht 157.5 cm (62\")   Wt 90.3 kg (199 lb)   SpO2 90%   BMI 36.40 kg/m²   AOx3  No visible goiter  Normal Respiratory Effort   No Edema    Labs    Lab Results   Component Value Date    WBC 9.8 05/01/2023    HGB 15.8 (H) 05/01/2023    HCT 50.5 (H) 05/01/2023    MCV 87.5 05/01/2023     05/01/2023     Lab Results   Component Value Date    BUN 12 05/09/2023    CREATININE 0.8 06/15/2018     06/15/2018    K 4.0 11/02/2021    CO2 30 06/15/2018    CALCIUM 9.2 06/15/2018     ==========================================      ICD-10-CM ICD-9-CM   1. Type 2 diabetes mellitus with hyperglycemia, with long-term current use of insulin  E11.65 250.00    Z79.4 790.29     V58.67   2. Hypertension associated with diabetes  E11.59 250.80    I15.2 401.9   3. Hypercholesterolemia  E78.00 272.0   4. Diabetic polyneuropathy associated with diabetes mellitus due to underlying condition  E08.42 249.60     357.2   5. Pre-ulcerative calluses  L84 700         Diabetes Mellitus    --------------------------------------------------------------------------------------------------------------------------------------------    Glycemic Management   Lab Results   Component Value Date    HGBA1C 8.7 (H) 05/22/2022       Ambulatory Glucose Profile Report    Days Analyzed : 2 week period ending on 09/07/23      Continuous Glucose Monitory Device:  FreeStyle Kervin 2 and Dash      47% in range, less than 5% lows    Interpretation : Diabetes Type 2 " "Uncontrolled   W hyperglycemia          synjardy 10/1000 mg once daily   Ozempic 0.5 mg weekly     U500 through pump        Basal    MN 0.35  7am 0.4  6pm 0.45   10 pm 0.35  Before     Now 0nly 0.05         Manual bolus of 15 bid    Change to lyumjev    Basal 1.5    Carb ratio 5    I asked her to inform pump 50 to 100 grams       Change omnipod dash and marianne 2 to     Omnipod 5 and Dexcom G6       ==========================================================================    Lipids  Lab Results   Component Value Date    CHLPL 176 12/30/2021    TRIG 261 (H) 12/30/2021    HDL 40 12/30/2021    LDL 84 12/30/2021       Statin  pravachol        ==========================================================================    Blood Pressure  /50   Pulse 97   Ht 157.5 cm (62\")   Wt 90.3 kg (199 lb)   SpO2 90%   BMI 36.40 kg/m²        ==========================================================================    Thyroid Assessment  No results found for: TSH        ==========================================================================    Vitamin B12  No results found for: [id number]      ==========================================================================               No orders of the defined types were placed in this encounter.               This document has been electronically signed by Darek Pascal MD on September 7, 2023 14:03 CDT                      "

## 2023-09-07 NOTE — Clinical Note
Rema, please help me  She is using an OmniPod Dash that she appears to get from [place] and Kervin 2 that she gets from her local pharmacy.  I sent a prescription for Dexcom G6 and OmniPod 5 to [facility] as that is her mail order.  Could you help me make this happen.  I know you can

## 2023-09-13 ENCOUNTER — OFFICE VISIT (OUTPATIENT)
Dept: ENDOCRINOLOGY | Facility: CLINIC | Age: 73
End: 2023-09-13
Payer: MEDICARE

## 2023-09-13 DIAGNOSIS — Z71.89 DIABETES EDUCATION, ENCOUNTER FOR: Primary | ICD-10-CM

## 2023-09-13 NOTE — PROGRESS NOTES
Milagros Nance is a 72 y.o.. female seen by diabetes educator on 09/13/2023 for review of medications and carbohydrate counting education.     1. Carbohydrate Counting/ Exchange Choices and Healthy Foods   I. Reviewed carbohydrate-containing foods, standard serving sizes, how to measure foods, and reading nutrition labels.   II. Provided patient with carbohydrate counting booklet (Carb counting and meal planning book).   III. Patient demonstrated understanding by correctly identifying and calculating carbohydrate amounts for various meals.     2. Omnipod Dosing   I. Patient running consistently high glucose levels.  When dosing discussed, patient only entering carbs, not glucose, so is not receiving correction doses. Correction bolus given for glucose of 295.   II. Patient demonstrated understanding by entering correct dosage for example meals and blood sugars.   III.  Patient awaiting arrival of Dexcom 6 and Omnipod 5 supplies.  Instructed to call Diabetes Educator so training can be arranged once supplies arrive.    Follow up per Dr. Vito Pascal in December. Provided contact information. Encouraged to call for any questions or concerns.  Verbalized understanding.    Tess Vaz  MSN, RN, CPHQ, HACP    Diabetes Educator

## (undated) DEVICE — SNAR POLYP SENSATION MICRO OVL 13 240X40

## (undated) DEVICE — TBG SMPL FLTR LINE NASL 02/C02 A/ BX/100

## (undated) DEVICE — ENDOGATOR AUXILIARY WATER JET CONNECTOR: Brand: ENDOGATOR

## (undated) DEVICE — FRCP BX RADJAW4 NDL 2.8 240 STD OG

## (undated) DEVICE — MASK,OXYGEN,MED CONC,ADLT,7' TUB, UC: Brand: PENDING

## (undated) DEVICE — CUFF,BP,DISP,1 TUBE,ADULT,HP: Brand: MEDLINE

## (undated) DEVICE — THE CHANNEL CLEANING BRUSH IS A NYLON FLEXI BRUSH ATTACHED TO A FLEXIBLE PLASTIC SHEATH DESIGNED TO SAFELY REMOVE DEBRIS FROM FLEXIBLE ENDOSCOPES.

## (undated) DEVICE — Device: Brand: DEFENDO AIR/WATER/SUCTION AND BIOPSY VALVE

## (undated) DEVICE — SENSR O2 OXIMAX FNGR A/ 18IN NONSTR

## (undated) DEVICE — THE SINGLE USE ETRAP – POLYP TRAP IS USED FOR SUCTION RETRIEVAL OF ENDOSCOPICALLY REMOVED POLYPS.: Brand: ETRAP

## (undated) DEVICE — YANKAUER,BULB TIP WITH VENT: Brand: ARGYLE